# Patient Record
Sex: FEMALE | Race: WHITE | NOT HISPANIC OR LATINO | URBAN - METROPOLITAN AREA
[De-identification: names, ages, dates, MRNs, and addresses within clinical notes are randomized per-mention and may not be internally consistent; named-entity substitution may affect disease eponyms.]

---

## 2018-07-18 ENCOUNTER — EMERGENCY (EMERGENCY)
Facility: HOSPITAL | Age: 78
LOS: 1 days | Discharge: HOME | End: 2018-07-18
Attending: EMERGENCY MEDICINE
Payer: MEDICARE

## 2018-07-18 VITALS
WEIGHT: 136.91 LBS | TEMPERATURE: 98 F | HEIGHT: 56.3 IN | HEART RATE: 83 BPM | OXYGEN SATURATION: 98 % | SYSTOLIC BLOOD PRESSURE: 140 MMHG | RESPIRATION RATE: 17 BRPM | DIASTOLIC BLOOD PRESSURE: 93 MMHG

## 2018-07-18 LAB
ALBUMIN SERPL ELPH-MCNC: 3.7 G/DL — SIGNIFICANT CHANGE UP (ref 3.5–5)
ALP SERPL-CCNC: 80 U/L — SIGNIFICANT CHANGE UP (ref 40–120)
ALT FLD-CCNC: 40 U/L DA — SIGNIFICANT CHANGE UP (ref 10–60)
ANION GAP SERPL CALC-SCNC: 8 MMOL/L — SIGNIFICANT CHANGE UP (ref 5–17)
AST SERPL-CCNC: 27 U/L — SIGNIFICANT CHANGE UP (ref 10–40)
BASOPHILS # BLD AUTO: 0.1 K/UL — SIGNIFICANT CHANGE UP (ref 0–0.2)
BASOPHILS NFR BLD AUTO: 1 % — SIGNIFICANT CHANGE UP (ref 0–2)
BILIRUB SERPL-MCNC: 0.2 MG/DL — SIGNIFICANT CHANGE UP (ref 0.2–1.2)
BUN SERPL-MCNC: 10 MG/DL — SIGNIFICANT CHANGE UP (ref 7–18)
CALCIUM SERPL-MCNC: 9.8 MG/DL — SIGNIFICANT CHANGE UP (ref 8.4–10.5)
CHLORIDE SERPL-SCNC: 105 MMOL/L — SIGNIFICANT CHANGE UP (ref 96–108)
CO2 SERPL-SCNC: 26 MMOL/L — SIGNIFICANT CHANGE UP (ref 22–31)
CREAT SERPL-MCNC: 0.69 MG/DL — SIGNIFICANT CHANGE UP (ref 0.5–1.3)
EOSINOPHIL # BLD AUTO: 0.2 K/UL — SIGNIFICANT CHANGE UP (ref 0–0.5)
EOSINOPHIL NFR BLD AUTO: 2 % — SIGNIFICANT CHANGE UP (ref 0–6)
ERYTHROCYTE [SEDIMENTATION RATE] IN BLOOD: 83 MM/HR — HIGH (ref 0–20)
GLUCOSE SERPL-MCNC: 154 MG/DL — HIGH (ref 70–99)
HCT VFR BLD CALC: 39.3 % — SIGNIFICANT CHANGE UP (ref 34.5–45)
HGB BLD-MCNC: 12.8 G/DL — SIGNIFICANT CHANGE UP (ref 11.5–15.5)
LYMPHOCYTES # BLD AUTO: 1.7 K/UL — SIGNIFICANT CHANGE UP (ref 1–3.3)
LYMPHOCYTES # BLD AUTO: 20.7 % — SIGNIFICANT CHANGE UP (ref 13–44)
MCHC RBC-ENTMCNC: 30.3 PG — SIGNIFICANT CHANGE UP (ref 27–34)
MCHC RBC-ENTMCNC: 32.6 GM/DL — SIGNIFICANT CHANGE UP (ref 32–36)
MCV RBC AUTO: 93 FL — SIGNIFICANT CHANGE UP (ref 80–100)
MONOCYTES # BLD AUTO: 1 K/UL — HIGH (ref 0–0.9)
MONOCYTES NFR BLD AUTO: 11.6 % — SIGNIFICANT CHANGE UP (ref 2–14)
NEUTROPHILS # BLD AUTO: 5.4 K/UL — SIGNIFICANT CHANGE UP (ref 1.8–7.4)
NEUTROPHILS NFR BLD AUTO: 64.6 % — SIGNIFICANT CHANGE UP (ref 43–77)
PLATELET # BLD AUTO: 342 K/UL — SIGNIFICANT CHANGE UP (ref 150–400)
POTASSIUM SERPL-MCNC: 4.5 MMOL/L — SIGNIFICANT CHANGE UP (ref 3.5–5.3)
POTASSIUM SERPL-SCNC: 4.5 MMOL/L — SIGNIFICANT CHANGE UP (ref 3.5–5.3)
PROT SERPL-MCNC: 8.1 G/DL — SIGNIFICANT CHANGE UP (ref 6–8.3)
RBC # BLD: 4.23 M/UL — SIGNIFICANT CHANGE UP (ref 3.8–5.2)
RBC # FLD: 12.2 % — SIGNIFICANT CHANGE UP (ref 10.3–14.5)
SODIUM SERPL-SCNC: 139 MMOL/L — SIGNIFICANT CHANGE UP (ref 135–145)
WBC # BLD: 8.4 K/UL — SIGNIFICANT CHANGE UP (ref 3.8–10.5)
WBC # FLD AUTO: 8.4 K/UL — SIGNIFICANT CHANGE UP (ref 3.8–10.5)

## 2018-07-18 PROCEDURE — 80053 COMPREHEN METABOLIC PANEL: CPT

## 2018-07-18 PROCEDURE — 73630 X-RAY EXAM OF FOOT: CPT | Mod: 26,LT

## 2018-07-18 PROCEDURE — 73630 X-RAY EXAM OF FOOT: CPT

## 2018-07-18 PROCEDURE — 99284 EMERGENCY DEPT VISIT MOD MDM: CPT

## 2018-07-18 PROCEDURE — 85652 RBC SED RATE AUTOMATED: CPT

## 2018-07-18 PROCEDURE — 85027 COMPLETE CBC AUTOMATED: CPT

## 2018-07-18 PROCEDURE — 96375 TX/PRO/DX INJ NEW DRUG ADDON: CPT

## 2018-07-18 PROCEDURE — 99284 EMERGENCY DEPT VISIT MOD MDM: CPT | Mod: 25

## 2018-07-18 PROCEDURE — 96374 THER/PROPH/DIAG INJ IV PUSH: CPT

## 2018-07-18 RX ORDER — VANCOMYCIN HCL 1 G
1000 VIAL (EA) INTRAVENOUS ONCE
Qty: 0 | Refills: 0 | Status: COMPLETED | OUTPATIENT
Start: 2018-07-18 | End: 2018-07-18

## 2018-07-18 RX ORDER — PIPERACILLIN AND TAZOBACTAM 4; .5 G/20ML; G/20ML
3.38 INJECTION, POWDER, LYOPHILIZED, FOR SOLUTION INTRAVENOUS ONCE
Qty: 0 | Refills: 0 | Status: COMPLETED | OUTPATIENT
Start: 2018-07-18 | End: 2018-07-18

## 2018-07-18 RX ADMIN — PIPERACILLIN AND TAZOBACTAM 200 GRAM(S): 4; .5 INJECTION, POWDER, LYOPHILIZED, FOR SOLUTION INTRAVENOUS at 13:52

## 2018-07-18 RX ADMIN — Medication 250 MILLIGRAM(S): at 13:52

## 2018-07-18 NOTE — ED ADULT NURSE NOTE - OBJECTIVE STATEMENT
Pt AOx3, ambulatory, c/o left foot bunion wound x 3 days.  p/w swelling, redness, mild bleeding. Pt denies fever, chills.

## 2018-07-18 NOTE — ED PROVIDER NOTE - OBJECTIVE STATEMENT
77 y/o female with PMHx of DM presents to the ED sent by podiatrist Dr. Brown for foot infection x 3 days. Pt noted to have an erythematous and inflamed L bunion x 3 days. Pt seen by podiatrist today who performed an I&D which unroofed a scab and drained a small amount of blood and pus. Pt sent to ED for dose of abx. Pt denies fever, chills, or any other complaints. NKDA. 79 y/o female with PMHx of DM presents to the ED sent by podiatrist Dr. Brown for foot infection x 3 days. Pt noted to have an erythematous and inflamed L bunion x 3 days. Pt seen by podiatrist today who performed an I&D which unroofed a scab and drained a small amount of blood and other fluid. Pt sent to ED for dose of abx. Pt denies fever, chills, or any other complaints. NKDA.

## 2018-07-18 NOTE — ED PROVIDER NOTE - MEDICAL DECISION MAKING DETAILS
Pt with infection at L bunion x 3 days. Will get labs, IV abx, xray and podiatry consultation. Pt with infection at L bunion x 3 days. Will get labs, IV abx, xray and podiatry consultation.  podiatry evaluated patient, labs normal. patient received dose of IV vanco zosyn. as per Dr Stover's recommendation patient is to be discharged home to continue agumentin. Patient instructed to return to Emergency Department for admission if worsening redness or fever. also if no improvement on oral antibiotics. admission offered but patient declined, prefers trial of outpatient treatment. patient and daughter understand plan

## 2018-07-18 NOTE — CONSULT NOTE ADULT - SUBJECTIVE AND OBJECTIVE BOX
Patient is a 78y old  Female who presents with a chief complaint of left foot painful bunion    HPI: 78 year old female was sent to ED from Dr. Brown's office for work up cellulitic left bunion. Patient states she was given Augmentin for the affected area, and that the redness has begun to dissipate. Patient states when the affected area was seen in the office, it was lanced to see if any drainage came out of it. Stephanie only got serosanguinous drainage out of it. Pt denies any constitutional symptoms.      PMH:DM (diabetes mellitus)    Allergies: No Known Allergies    Medications:   FH:  PSX: No significant past surgical history    SH:     Vital Signs Last 24 Hrs  T(C): 36.5 (18 Jul 2018 12:00), Max: 36.5 (18 Jul 2018 12:00)  T(F): 97.7 (18 Jul 2018 12:00), Max: 97.7 (18 Jul 2018 12:00)  HR: 83 (18 Jul 2018 12:00) (83 - 83)  BP: 140/93 (18 Jul 2018 12:00) (140/93 - 140/93)  BP(mean): --  RR: 17 (18 Jul 2018 12:00) (17 - 17)  SpO2: 98% (18 Jul 2018 12:00) (98% - 98%)    LABS                        12.8   8.4   )-----------( 342      ( 18 Jul 2018 13:50 )             39.3               07-18    139  |  105  |  10  ----------------------------<  154<H>  4.5   |  26  |  0.69    Ca    9.8      18 Jul 2018 13:50    TPro  8.1  /  Alb  3.7  /  TBili  0.2  /  DBili  x   /  AST  27  /  ALT  40  /  AlkPhos  80  07-18      ROS  REVIEW OF SYSTEMS:    CONSTITUTIONAL: No fever, weight loss, or fatigue  EYES: No eye pain, visual disturbances, or discharge  ENMT:  No difficulty hearing, tinnitus, vertigo; No sinus or throat pain  NECK: No pain or stiffness  BREASTS: No pain, masses, or nipple discharge  RESPIRATORY: No cough, wheezing, chills or hemoptysis; No shortness of breath  CARDIOVASCULAR: No chest pain, palpitations, dizziness, or leg swelling  GASTROINTESTINAL: No abdominal or epigastric pain. No nausea, vomiting, or hematemesis; No diarrhea or constipation. No melena or hematochezia.  GENITOURINARY: No dysuria, frequency, hematuria, or incontinence  NEUROLOGICAL: No headaches, memory loss, loss of strength, numbness, or tremors  SKIN: No itching, burning, rashes, or lesions   LYMPH NODES: No enlarged glands  ENDOCRINE: No heat or cold intolerance; No hair loss  MUSCULOSKELETAL: No joint pain or swelling; No muscle, back, or extremity pain  PSYCHIATRIC: No depression, anxiety, mood swings, or difficulty sleeping  HEME/LYMPH: No easy bruising, or bleeding gums  ALLERY AND IMMUNOLOGIC: No hives or eczema      PHYSICAL EXAM  GEN: GILDA GOMEZ is a pleasant well-nourished, well developed 78y Female in no acute distress, alert awake, and oriented to person, place and time.   LE Focused:    Vasc:  DP/PT 2/4 b/l; CFT < 3 sec x 10; TG warm to cool; erythema and edema noted over the left bunion medial eminence   Derm: Erythema and edema noted over the left bunion medial eminence - no drainage; no malodor; no PTB  Neuro: Grossly intact    Imaging: < from: Xray Foot AP + Lateral + Oblique, Left (07.18.18 @ 14:38) >  3 views of the left foot are acquired without a previous examination for   comparison.    Impression: Hallux valgus.     No evidence for acute fracture or dislocation.    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    No radiographic evidence for osteomyelitis.    < end of copied text >    A:  Cellulitis    P:  Pt evaluated and chart reviewed  Labs reviewed - no current white count  Imaging reviewed - see above - no signs of OM  Per Dr. Brown, patient to continue oral Augmentin for the cellulitic area  Patient told to return to the ED if the patient begins to show any F/V/N/C/SOB - which she denied at this visit  Patient will follow up with Dr. Brown outpatient

## 2018-07-18 NOTE — ED PROVIDER NOTE - MUSCULOSKELETAL MINIMAL EXAM
L foot erythema, tenderness over L MTP area, no foul odor L foot erythema, tenderness over L MTP area, no foul odor or drainage

## 2020-07-08 NOTE — ED ADULT NURSE NOTE - NS ED NURSE LEVEL OF CONSCIOUSNESS AFFECT
Patient has been scheduled for open colon. She is aware of the pre-op covid-19 testing. Prep instructions have been emailed to her at [email or web address]   Calm

## 2021-02-02 NOTE — ED PROVIDER NOTE - GASTROINTESTINAL, MLM
Price (Do Not Change): 0.00 Instructions: This plan will send the code FBSE to the PM system.  DO NOT or CHANGE the price. Detail Level: Simple Abdomen soft, non-tender, no guarding.

## 2021-05-30 NOTE — ED PROVIDER NOTE - DIAGNOSIS COUNSELING, MDM
Yes conducted a detailed discussion... I had a detailed discussion with the patient and/or guardian regarding the historical points, exam findings, and any diagnostic results supporting the discharge/admit diagnosis.

## 2023-06-01 NOTE — ED ADULT NURSE NOTE - NS ED NURSE RECORD ANOTHER HT AND WT
Yes Sotyktu Pregnancy And Lactation Text: There is insufficient data to evaluate whether or not Sotyktu is safe to use during pregnancy.? ?It is not known if Sotyktu passes into breast milk and whether or not it is safe to use when breastfeeding.??

## 2024-11-17 ENCOUNTER — INPATIENT (INPATIENT)
Facility: HOSPITAL | Age: 84
LOS: 5 days | Discharge: NURSING, REHAB OR LONG-TERM CARE | DRG: 948 | End: 2024-11-23
Attending: INTERNAL MEDICINE | Admitting: INTERNAL MEDICINE
Payer: MEDICARE

## 2024-11-17 VITALS
HEART RATE: 96 BPM | WEIGHT: 139.99 LBS | RESPIRATION RATE: 18 BRPM | TEMPERATURE: 98 F | HEIGHT: 59 IN | SYSTOLIC BLOOD PRESSURE: 120 MMHG | DIASTOLIC BLOOD PRESSURE: 78 MMHG | OXYGEN SATURATION: 95 %

## 2024-11-17 LAB
ALBUMIN SERPL ELPH-MCNC: 3.4 G/DL — LOW (ref 3.5–5)
ALP SERPL-CCNC: 61 U/L — SIGNIFICANT CHANGE UP (ref 40–120)
ALT FLD-CCNC: 22 U/L DA — SIGNIFICANT CHANGE UP (ref 10–60)
ANION GAP SERPL CALC-SCNC: 6 MMOL/L — SIGNIFICANT CHANGE UP (ref 5–17)
APTT BLD: 25.3 SEC — SIGNIFICANT CHANGE UP (ref 24.5–35.6)
AST SERPL-CCNC: 15 U/L — SIGNIFICANT CHANGE UP (ref 10–40)
BASOPHILS # BLD AUTO: 0.01 K/UL — SIGNIFICANT CHANGE UP (ref 0–0.2)
BASOPHILS NFR BLD AUTO: 0.1 % — SIGNIFICANT CHANGE UP (ref 0–2)
BILIRUB SERPL-MCNC: 0.3 MG/DL — SIGNIFICANT CHANGE UP (ref 0.2–1.2)
BUN SERPL-MCNC: 39 MG/DL — HIGH (ref 7–18)
CALCIUM SERPL-MCNC: 9.8 MG/DL — SIGNIFICANT CHANGE UP (ref 8.4–10.5)
CHLORIDE SERPL-SCNC: 102 MMOL/L — SIGNIFICANT CHANGE UP (ref 96–108)
CO2 SERPL-SCNC: 28 MMOL/L — SIGNIFICANT CHANGE UP (ref 22–31)
CREAT SERPL-MCNC: 0.76 MG/DL — SIGNIFICANT CHANGE UP (ref 0.5–1.3)
EGFR: 76 ML/MIN/1.73M2 — SIGNIFICANT CHANGE UP
EOSINOPHIL # BLD AUTO: 0 K/UL — SIGNIFICANT CHANGE UP (ref 0–0.5)
EOSINOPHIL NFR BLD AUTO: 0 % — SIGNIFICANT CHANGE UP (ref 0–6)
GLUCOSE SERPL-MCNC: 245 MG/DL — HIGH (ref 70–99)
HCT VFR BLD CALC: 29.5 % — LOW (ref 34.5–45)
HGB BLD-MCNC: 10.1 G/DL — LOW (ref 11.5–15.5)
IMM GRANULOCYTES NFR BLD AUTO: 0.3 % — SIGNIFICANT CHANGE UP (ref 0–0.9)
INR BLD: 1.13 RATIO — SIGNIFICANT CHANGE UP (ref 0.85–1.16)
LIDOCAIN IGE QN: 17 U/L — SIGNIFICANT CHANGE UP (ref 13–75)
LYMPHOCYTES # BLD AUTO: 1.76 K/UL — SIGNIFICANT CHANGE UP (ref 1–3.3)
LYMPHOCYTES # BLD AUTO: 14.1 % — SIGNIFICANT CHANGE UP (ref 13–44)
MCHC RBC-ENTMCNC: 30.8 PG — SIGNIFICANT CHANGE UP (ref 27–34)
MCHC RBC-ENTMCNC: 34.2 G/DL — SIGNIFICANT CHANGE UP (ref 32–36)
MCV RBC AUTO: 89.9 FL — SIGNIFICANT CHANGE UP (ref 80–100)
MONOCYTES # BLD AUTO: 1.38 K/UL — HIGH (ref 0–0.9)
MONOCYTES NFR BLD AUTO: 11.1 % — SIGNIFICANT CHANGE UP (ref 2–14)
NEUTROPHILS # BLD AUTO: 9.29 K/UL — HIGH (ref 1.8–7.4)
NEUTROPHILS NFR BLD AUTO: 74.4 % — SIGNIFICANT CHANGE UP (ref 43–77)
NRBC # BLD: 0 /100 WBCS — SIGNIFICANT CHANGE UP (ref 0–0)
PLATELET # BLD AUTO: 360 K/UL — SIGNIFICANT CHANGE UP (ref 150–400)
POTASSIUM SERPL-MCNC: 3.9 MMOL/L — SIGNIFICANT CHANGE UP (ref 3.5–5.3)
POTASSIUM SERPL-SCNC: 3.9 MMOL/L — SIGNIFICANT CHANGE UP (ref 3.5–5.3)
PROT SERPL-MCNC: 7.3 G/DL — SIGNIFICANT CHANGE UP (ref 6–8.3)
PROTHROM AB SERPL-ACNC: 13.1 SEC — SIGNIFICANT CHANGE UP (ref 9.9–13.4)
RBC # BLD: 3.28 M/UL — LOW (ref 3.8–5.2)
RBC # FLD: 13.3 % — SIGNIFICANT CHANGE UP (ref 10.3–14.5)
SODIUM SERPL-SCNC: 136 MMOL/L — SIGNIFICANT CHANGE UP (ref 135–145)
WBC # BLD: 12.48 K/UL — HIGH (ref 3.8–10.5)
WBC # FLD AUTO: 12.48 K/UL — HIGH (ref 3.8–10.5)

## 2024-11-17 PROCEDURE — 74177 CT ABD & PELVIS W/CONTRAST: CPT | Mod: 26,MC

## 2024-11-17 PROCEDURE — 99285 EMERGENCY DEPT VISIT HI MDM: CPT

## 2024-11-17 PROCEDURE — 70450 CT HEAD/BRAIN W/O DYE: CPT | Mod: 26,MC

## 2024-11-17 RX ORDER — SODIUM CHLORIDE 9 MG/ML
1000 INJECTION, SOLUTION INTRAMUSCULAR; INTRAVENOUS; SUBCUTANEOUS ONCE
Refills: 0 | Status: COMPLETED | OUTPATIENT
Start: 2024-11-17 | End: 2024-11-17

## 2024-11-17 RX ADMIN — SODIUM CHLORIDE 1000 MILLILITER(S): 9 INJECTION, SOLUTION INTRAMUSCULAR; INTRAVENOUS; SUBCUTANEOUS at 21:04

## 2024-11-17 RX ADMIN — SODIUM CHLORIDE 1000 MILLILITER(S): 9 INJECTION, SOLUTION INTRAMUSCULAR; INTRAVENOUS; SUBCUTANEOUS at 22:04

## 2024-11-17 NOTE — ED ADULT NURSE NOTE - NSFALLHARMRISKINTERV_ED_ALL_ED
Assistance OOB with selected safe patient handling equipment if applicable/Assistance with ambulation/Communicate risk of Fall with Harm to all staff, patient, and family/Monitor gait and stability/Monitor for mental status changes and reorient to person, place, and time, as needed/Provide visual cue: red socks, yellow wristband, yellow gown, etc/Reinforce activity limits and safety measures with patient and family/Toileting schedule using arm’s reach rule for commode and bathroom/Use of alarms - bed, stretcher, chair and/or video monitoring/Bed in lowest position, wheels locked, appropriate side rails in place/Call bell, personal items and telephone in reach/Instruct patient to call for assistance before getting out of bed/chair/stretcher/Non-slip footwear applied when patient is off stretcher/[place] to call system/Physically safe environment - no spills, clutter or unnecessary equipment/Purposeful Proactive Rounding/Room/bathroom lighting operational, light cord in reach

## 2024-11-17 NOTE — ED ADULT NURSE NOTE - OBJECTIVE STATEMENT
patient c/o weakness and altered mental status x 4 days. Denies chest pain, dizziness, headache, nausea and vomiting. no SOB.

## 2024-11-17 NOTE — ED PROVIDER NOTE - OBJECTIVE STATEMENT
84-year-old history of diabetes presenting for  confusion for past 4 days family endorses she had vomiting x 1 4 days ago otherwise been noting generalized weakness without focal deficit family also noted that she has not been speaking for the past 4 days

## 2024-11-17 NOTE — ED PROVIDER NOTE - CLINICAL SUMMARY MEDICAL DECISION MAKING FREE TEXT BOX
Patient presenting for altered mental status per family patient baseline able to communicate patient currently awake alert but not communicating will obtain labs CT assess for infection and assess for ICH ED observation and reassess patient's symptoms for over 3 days out of tPA window will monitor

## 2024-11-17 NOTE — ED ADULT TRIAGE NOTE - CHIEF COMPLAINT QUOTE
[facility] reports that  pt is  AMS from Friday , confused , poor appetite , non verbal  from Friday .

## 2024-11-17 NOTE — ED ADULT NURSE NOTE - CHPI ED NUR SYMPTOMS NEG
no blurred vision/no change in level of consciousness/no confusion/no dizziness/no fever/no loss of consciousness/no nausea/no numbness/no vomiting/no weakness no blurred vision/no change in level of consciousness/no dizziness/no fever/no loss of consciousness/no nausea/no numbness/no vomiting/no weakness

## 2024-11-18 DIAGNOSIS — E11.9 TYPE 2 DIABETES MELLITUS WITHOUT COMPLICATIONS: ICD-10-CM

## 2024-11-18 DIAGNOSIS — G93.40 ENCEPHALOPATHY, UNSPECIFIED: ICD-10-CM

## 2024-11-18 DIAGNOSIS — K29.80 DUODENITIS WITHOUT BLEEDING: ICD-10-CM

## 2024-11-18 DIAGNOSIS — Z29.9 ENCOUNTER FOR PROPHYLACTIC MEASURES, UNSPECIFIED: ICD-10-CM

## 2024-11-18 DIAGNOSIS — A41.9 SEPSIS, UNSPECIFIED ORGANISM: ICD-10-CM

## 2024-11-18 DIAGNOSIS — R41.82 ALTERED MENTAL STATUS, UNSPECIFIED: ICD-10-CM

## 2024-11-18 DIAGNOSIS — E78.5 HYPERLIPIDEMIA, UNSPECIFIED: ICD-10-CM

## 2024-11-18 DIAGNOSIS — I10 ESSENTIAL (PRIMARY) HYPERTENSION: ICD-10-CM

## 2024-11-18 LAB
A1C WITH ESTIMATED AVERAGE GLUCOSE RESULT: 7.4 % — HIGH (ref 4–5.6)
ALBUMIN SERPL ELPH-MCNC: 3.1 G/DL — LOW (ref 3.5–5)
ALP SERPL-CCNC: 55 U/L — SIGNIFICANT CHANGE UP (ref 40–120)
ALT FLD-CCNC: 19 U/L DA — SIGNIFICANT CHANGE UP (ref 10–60)
ANION GAP SERPL CALC-SCNC: 6 MMOL/L — SIGNIFICANT CHANGE UP (ref 5–17)
APPEARANCE UR: CLEAR — SIGNIFICANT CHANGE UP
AST SERPL-CCNC: 12 U/L — SIGNIFICANT CHANGE UP (ref 10–40)
BASOPHILS # BLD AUTO: 0.02 K/UL — SIGNIFICANT CHANGE UP (ref 0–0.2)
BASOPHILS NFR BLD AUTO: 0.2 % — SIGNIFICANT CHANGE UP (ref 0–2)
BILIRUB SERPL-MCNC: 0.2 MG/DL — SIGNIFICANT CHANGE UP (ref 0.2–1.2)
BILIRUB UR-MCNC: NEGATIVE — SIGNIFICANT CHANGE UP
BUN SERPL-MCNC: 29 MG/DL — HIGH (ref 7–18)
CALCIUM SERPL-MCNC: 9.2 MG/DL — SIGNIFICANT CHANGE UP (ref 8.4–10.5)
CHLORIDE SERPL-SCNC: 107 MMOL/L — SIGNIFICANT CHANGE UP (ref 96–108)
CO2 SERPL-SCNC: 29 MMOL/L — SIGNIFICANT CHANGE UP (ref 22–31)
COLOR SPEC: YELLOW — SIGNIFICANT CHANGE UP
CREAT SERPL-MCNC: 0.58 MG/DL — SIGNIFICANT CHANGE UP (ref 0.5–1.3)
DIFF PNL FLD: NEGATIVE — SIGNIFICANT CHANGE UP
EGFR: 88 ML/MIN/1.73M2 — SIGNIFICANT CHANGE UP
EOSINOPHIL # BLD AUTO: 0.02 K/UL — SIGNIFICANT CHANGE UP (ref 0–0.5)
EOSINOPHIL NFR BLD AUTO: 0.2 % — SIGNIFICANT CHANGE UP (ref 0–6)
ESTIMATED AVERAGE GLUCOSE: 166 MG/DL — HIGH (ref 68–114)
FLUAV AG NPH QL: SIGNIFICANT CHANGE UP
FLUBV AG NPH QL: SIGNIFICANT CHANGE UP
GLUCOSE BLDC GLUCOMTR-MCNC: 100 MG/DL — HIGH (ref 70–99)
GLUCOSE BLDC GLUCOMTR-MCNC: 69 MG/DL — LOW (ref 70–99)
GLUCOSE BLDC GLUCOMTR-MCNC: 69 MG/DL — LOW (ref 70–99)
GLUCOSE BLDC GLUCOMTR-MCNC: 71 MG/DL — SIGNIFICANT CHANGE UP (ref 70–99)
GLUCOSE BLDC GLUCOMTR-MCNC: 75 MG/DL — SIGNIFICANT CHANGE UP (ref 70–99)
GLUCOSE BLDC GLUCOMTR-MCNC: 96 MG/DL — SIGNIFICANT CHANGE UP (ref 70–99)
GLUCOSE SERPL-MCNC: 63 MG/DL — LOW (ref 70–99)
GLUCOSE UR QL: >=1000 MG/DL
HCT VFR BLD CALC: 26.3 % — LOW (ref 34.5–45)
HGB BLD-MCNC: 8.9 G/DL — LOW (ref 11.5–15.5)
IMM GRANULOCYTES NFR BLD AUTO: 0.3 % — SIGNIFICANT CHANGE UP (ref 0–0.9)
KETONES UR-MCNC: NEGATIVE MG/DL — SIGNIFICANT CHANGE UP
LACTATE SERPL-SCNC: 0.8 MMOL/L — SIGNIFICANT CHANGE UP (ref 0.7–2)
LEUKOCYTE ESTERASE UR-ACNC: NEGATIVE — SIGNIFICANT CHANGE UP
LYMPHOCYTES # BLD AUTO: 1.69 K/UL — SIGNIFICANT CHANGE UP (ref 1–3.3)
LYMPHOCYTES # BLD AUTO: 14.9 % — SIGNIFICANT CHANGE UP (ref 13–44)
MAGNESIUM SERPL-MCNC: 1.6 MG/DL — SIGNIFICANT CHANGE UP (ref 1.6–2.6)
MCHC RBC-ENTMCNC: 30.9 PG — SIGNIFICANT CHANGE UP (ref 27–34)
MCHC RBC-ENTMCNC: 33.8 G/DL — SIGNIFICANT CHANGE UP (ref 32–36)
MCV RBC AUTO: 91.3 FL — SIGNIFICANT CHANGE UP (ref 80–100)
MONOCYTES # BLD AUTO: 1.19 K/UL — HIGH (ref 0–0.9)
MONOCYTES NFR BLD AUTO: 10.5 % — SIGNIFICANT CHANGE UP (ref 2–14)
NEUTROPHILS # BLD AUTO: 8.38 K/UL — HIGH (ref 1.8–7.4)
NEUTROPHILS NFR BLD AUTO: 73.9 % — SIGNIFICANT CHANGE UP (ref 43–77)
NITRITE UR-MCNC: NEGATIVE — SIGNIFICANT CHANGE UP
NRBC # BLD: 0 /100 WBCS — SIGNIFICANT CHANGE UP (ref 0–0)
PH UR: 5 — SIGNIFICANT CHANGE UP (ref 5–8)
PHOSPHATE SERPL-MCNC: 2.6 MG/DL — SIGNIFICANT CHANGE UP (ref 2.5–4.5)
PLATELET # BLD AUTO: 307 K/UL — SIGNIFICANT CHANGE UP (ref 150–400)
POTASSIUM SERPL-MCNC: 3.1 MMOL/L — LOW (ref 3.5–5.3)
POTASSIUM SERPL-SCNC: 3.1 MMOL/L — LOW (ref 3.5–5.3)
PROCALCITONIN SERPL-MCNC: 0.45 NG/ML — HIGH (ref 0.02–0.1)
PROT SERPL-MCNC: 6.5 G/DL — SIGNIFICANT CHANGE UP (ref 6–8.3)
PROT UR-MCNC: 100 MG/DL
RBC # BLD: 2.88 M/UL — LOW (ref 3.8–5.2)
RBC # FLD: 13.3 % — SIGNIFICANT CHANGE UP (ref 10.3–14.5)
RSV RNA NPH QL NAA+NON-PROBE: SIGNIFICANT CHANGE UP
SARS-COV-2 RNA SPEC QL NAA+PROBE: SIGNIFICANT CHANGE UP
SODIUM SERPL-SCNC: 142 MMOL/L — SIGNIFICANT CHANGE UP (ref 135–145)
SP GR SPEC: 1.03 — SIGNIFICANT CHANGE UP (ref 1–1.03)
TSH SERPL-MCNC: 0.47 UU/ML — SIGNIFICANT CHANGE UP (ref 0.34–4.82)
UROBILINOGEN FLD QL: 0.2 MG/DL — SIGNIFICANT CHANGE UP (ref 0.2–1)
WBC # BLD: 11.33 K/UL — HIGH (ref 3.8–10.5)
WBC # FLD AUTO: 11.33 K/UL — HIGH (ref 3.8–10.5)

## 2024-11-18 PROCEDURE — 71045 X-RAY EXAM CHEST 1 VIEW: CPT | Mod: 26

## 2024-11-18 PROCEDURE — 99223 1ST HOSP IP/OBS HIGH 75: CPT | Mod: FS

## 2024-11-18 RX ORDER — 0.9 % SODIUM CHLORIDE 0.9 %
1000 INTRAVENOUS SOLUTION INTRAVENOUS ONCE
Refills: 0 | Status: COMPLETED | OUTPATIENT
Start: 2024-11-18 | End: 2024-11-18

## 2024-11-18 RX ORDER — 0.9 % SODIUM CHLORIDE 0.9 %
1000 INTRAVENOUS SOLUTION INTRAVENOUS
Refills: 0 | Status: DISCONTINUED | OUTPATIENT
Start: 2024-11-18 | End: 2024-11-18

## 2024-11-18 RX ORDER — OLANZAPINE 20 MG/1
2.5 TABLET ORAL DAILY
Refills: 0 | Status: DISCONTINUED | OUTPATIENT
Start: 2024-11-18 | End: 2024-11-20

## 2024-11-18 RX ORDER — 0.9 % SODIUM CHLORIDE 0.9 %
1000 INTRAVENOUS SOLUTION INTRAVENOUS
Refills: 0 | Status: DISCONTINUED | OUTPATIENT
Start: 2024-11-18 | End: 2024-11-19

## 2024-11-18 RX ORDER — ACETAMINOPHEN 500MG 500 MG/1
650 TABLET, COATED ORAL EVERY 6 HOURS
Refills: 0 | Status: DISCONTINUED | OUTPATIENT
Start: 2024-11-18 | End: 2024-11-20

## 2024-11-18 RX ORDER — POTASSIUM CHLORIDE 600 MG/1
40 TABLET, EXTENDED RELEASE ORAL ONCE
Refills: 0 | Status: DISCONTINUED | OUTPATIENT
Start: 2024-11-18 | End: 2024-11-18

## 2024-11-18 RX ORDER — ENOXAPARIN SODIUM 30 MG/.3ML
40 INJECTION SUBCUTANEOUS EVERY 24 HOURS
Refills: 0 | Status: DISCONTINUED | OUTPATIENT
Start: 2024-11-18 | End: 2024-11-19

## 2024-11-18 RX ORDER — METOPROLOL TARTRATE 100 MG/1
50 TABLET, FILM COATED ORAL
Refills: 0 | Status: DISCONTINUED | OUTPATIENT
Start: 2024-11-18 | End: 2024-11-20

## 2024-11-18 RX ORDER — ACETAMINOPHEN, DIPHENHYDRAMINE HCL, PHENYLEPHRINE HCL 325; 25; 5 MG/1; MG/1; MG/1
5 TABLET ORAL AT BEDTIME
Refills: 0 | Status: DISCONTINUED | OUTPATIENT
Start: 2024-11-18 | End: 2024-11-20

## 2024-11-18 RX ORDER — GLUCAGON INJECTION, SOLUTION 0.5 MG/.1ML
1 INJECTION, SOLUTION SUBCUTANEOUS ONCE
Refills: 0 | Status: DISCONTINUED | OUTPATIENT
Start: 2024-11-18 | End: 2024-11-20

## 2024-11-18 RX ORDER — CEFTRIAXONE SODIUM 1 G
1000 VIAL (EA) INJECTION EVERY 24 HOURS
Refills: 0 | Status: DISCONTINUED | OUTPATIENT
Start: 2024-11-18 | End: 2024-11-20

## 2024-11-18 RX ORDER — OLANZAPINE 20 MG/1
2.5 TABLET ORAL ONCE
Refills: 0 | Status: COMPLETED | OUTPATIENT
Start: 2024-11-18 | End: 2024-11-18

## 2024-11-18 RX ORDER — POTASSIUM CHLORIDE 600 MG/1
10 TABLET, EXTENDED RELEASE ORAL
Refills: 0 | Status: COMPLETED | OUTPATIENT
Start: 2024-11-18 | End: 2024-11-18

## 2024-11-18 RX ADMIN — Medication 65 MILLILITER(S): at 11:09

## 2024-11-18 RX ADMIN — ENOXAPARIN SODIUM 40 MILLIGRAM(S): 30 INJECTION SUBCUTANEOUS at 06:58

## 2024-11-18 RX ADMIN — POTASSIUM CHLORIDE 50 MILLIEQUIVALENT(S): 600 TABLET, EXTENDED RELEASE ORAL at 11:10

## 2024-11-18 RX ADMIN — Medication 65 MILLILITER(S): at 17:53

## 2024-11-18 RX ADMIN — POTASSIUM CHLORIDE 50 MILLIEQUIVALENT(S): 600 TABLET, EXTENDED RELEASE ORAL at 13:38

## 2024-11-18 RX ADMIN — METOPROLOL TARTRATE 50 MILLIGRAM(S): 100 TABLET, FILM COATED ORAL at 17:53

## 2024-11-18 RX ADMIN — METOPROLOL TARTRATE 50 MILLIGRAM(S): 100 TABLET, FILM COATED ORAL at 10:42

## 2024-11-18 RX ADMIN — Medication 100 MILLIGRAM(S): at 06:56

## 2024-11-18 RX ADMIN — POTASSIUM CHLORIDE 50 MILLIEQUIVALENT(S): 600 TABLET, EXTENDED RELEASE ORAL at 12:25

## 2024-11-18 RX ADMIN — Medication 1000 MILLILITER(S): at 02:22

## 2024-11-18 RX ADMIN — Medication 10 MILLIGRAM(S): at 20:32

## 2024-11-18 RX ADMIN — OLANZAPINE 2.5 MILLIGRAM(S): 20 TABLET ORAL at 21:56

## 2024-11-18 NOTE — CONSULT NOTE ADULT - ASSESSMENT
Leukocytosis - no obvious source of infection identified at this time        Plan - Cont Rocephin 1gm iv q24hrs for now  await culture results.

## 2024-11-18 NOTE — H&P ADULT - PROBLEM SELECTOR PLAN 3
Will continue Metoprolol with parameters  Resume ACEi later in the hospital course  BP target <130/90 mmHg  DASH/TLC diet  Adjust medications as needed to meet target.

## 2024-11-18 NOTE — H&P ADULT - PROBLEM SELECTOR PLAN 2
Patient now at baseline  Ambulate w/ assistance  F/U Bladder scan  Monitor electrolytes   Hydration   Fall precautions  Aspiration precautions

## 2024-11-18 NOTE — CONSULT NOTE ADULT - RESPIRATORY
No heavy lifting/No sports/gym/No excercise
clear to auscultation bilaterally/no wheezes/no rales/no rhonchi

## 2024-11-18 NOTE — H&P ADULT - ASSESSMENT
An 84 year old female, living w/ son at home, ambulating w/ a cane, w/ Dementia, AAOx 2 to 3 at baseline, w/ PMHx of DM, HTN, and HLD was brought into the ED by EMS due to AMS. Patient now at baseline. Admitted to medicine for sepsis.     Daughter with list of some of the medications at bedside. Missing frequency and Insulin.   MORNING TEAM TO CONFIRM REST OF [facility]

## 2024-11-18 NOTE — CHART NOTE - NSCHARTNOTEFT_GEN_A_CORE
EVENT:     SUBJECTIVE:    OBJECTIVE:  Vital Signs Last 24 Hrs  T(C): 36.2 (18 Nov 2024 19:55), Max: 36.8 (18 Nov 2024 10:39)  T(F): 97.2 (18 Nov 2024 19:55), Max: 98.2 (18 Nov 2024 10:39)  HR: 81 (18 Nov 2024 19:55) (74 - 92)  BP: 131/95 (18 Nov 2024 19:55) (99/50 - 137/78)  BP(mean): --  RR: 18 (18 Nov 2024 19:55) (16 - 18)  SpO2: 97% (18 Nov 2024 19:55) (93% - 97%)    Parameters below as of 18 Nov 2024 19:55  Patient On (Oxygen Delivery Method): room air        PHYSICAL EXAM:  Neuro: Awake and alert, oriented to person, place, and time  Cardiovascular: + S1, S2, no murmurs, rubs, or bruits  Respiratory: clear to auscultation bilaterally with good air entry   GI: Abdomen soft, non-tender, bowel sounds present   : Non distended;   Skin: warm and dry; no rash      LABS:                        8.9    11.33 )-----------( 307      ( 18 Nov 2024 08:14 )             26.3     11-18    142  |  107  |  29[H]  ----------------------------<  63[L]  3.1[L]   |  29  |  0.58    Ca    9.2      18 Nov 2024 08:14  Phos  2.6     11-18  Mg     1.6     11-18    TPro  6.5  /  Alb  3.1[L]  /  TBili  0.2  /  DBili  x   /  AST  12  /  ALT  19  /  AlkPhos  55  11-18        EKG:   IMAGING:    ASSESSMENT:  HPI:  An 84 year old female, living w/ son at home, ambulating w/ a cane, w/ Dementia, AAOx 2 to 3 at baseline, w/ PMHx of DM, HTN, and HLD was brought into the ED by EMS due to AMS. Patient AAOx2 to person and place, but does not know why she is in the hospital. Denies chest pain, dyspnea, palpitations, nausea, vomiting, chills, numbness, headache, visual spots, hearing/taste/smell changes, warm sensation, urinary or stool incontinence. Patient mentioned feeling tired. As per daughter, patient was having decreased appetite, nausea, one episode of non bloody emesis, 2 episodes of non bloody, watery stools, abdominal pain, weakness, and confused for the last 2 days. Daughter gave her Prilosec yesterday which resolved her abdominal pain. No recent sick contacts or travel. She does not have any other complaints. (18 Nov 2024 02:16)      PLAN:     FOLLOW UP / RESULT: EVENT: Notified by RN, pt is acutely agitated, physically aggressive towards staff.     HPI:  84 year old female, living w/ son at home, ambulating w/ a cane, w/ Dementia, AAOx 2 to 3 at baseline, w/ PMHx of DM, HTN, and HLD was brought into the ED by EMS due to AMS.  Admitted to medicine for sepsis.     SUBJECTIVE: Unable to assess due to mental status. Pt seen and examined at bedside, A&Ox1, very confused. Observed with both legs over the side rails and tubing in her hand. When attempting to reorient, she hits and kicks staff.     OBJECTIVE:  Vital Signs Last 24 Hrs  T(C): 36.2 (18 Nov 2024 19:55), Max: 36.8 (18 Nov 2024 10:39)  T(F): 97.2 (18 Nov 2024 19:55), Max: 98.2 (18 Nov 2024 10:39)  HR: 81 (18 Nov 2024 19:55) (74 - 92)  BP: 131/95 (18 Nov 2024 19:55) (99/50 - 137/78)  BP(mean): --  RR: 18 (18 Nov 2024 19:55) (16 - 18)  SpO2: 97% (18 Nov 2024 19:55) (93% - 97%)    Parameters below as of 18 Nov 2024 19:55  Patient On (Oxygen Delivery Method): room air    PHYSICAL EXAM:  General: frail, elderly women lying in bed  Neuro: Awake and alert, but confused  Cardiovascular: + S1, S2, no murmurs, rubs, or bruits  Respiratory: clear to auscultation bilaterally with good air entry   GI: Abdomen soft, non-tender, bowel sounds present   : Non distended;   Skin: warm and dry; no rash      LABS:                        8.9    11.33 )-----------( 307      ( 18 Nov 2024 08:14 )             26.3     11-18    142  |  107  |  29[H]  ----------------------------<  63[L]  3.1[L]   |  29  |  0.58    Ca    9.2      18 Nov 2024 08:14  Phos  2.6     11-18  Mg     1.6     11-18    TPro  6.5  /  Alb  3.1[L]  /  TBili  0.2  /  DBili  x   /  AST  12  /  ALT  19  /  AlkPhos  55  11-18        EKG:   IMAGING:    ASSESSMENT/PROBLEM: Acute agitation likely exacerbated by infectious process and underlying dementia     PLAN:     -Due to irate state, pt unable to take PO antipsychotic. Zyrexa 2.5 mg IM x 1 dose STAT.   -Enhanced supervision per nursing  -Fall precautions  -Continue current/supportive measures     FOLLOW UP / RESULT:    -Monitor effectiveness of IM antipsychotic

## 2024-11-18 NOTE — PATIENT PROFILE ADULT - INTERPRETER NAME
You can access the FollowMyHealth Patient Portal offered by [facility] by registering at the following website: [email or web address]. By joining [facility]’s FollowMyHealth portal, you will also be able to view your health information using other applications (apps) compatible with our system. Brenda Ga

## 2024-11-18 NOTE — CHART NOTE - NSCHARTNOTEFT_GEN_A_CORE
HPI:  An 84 year old female, living w/ son at home, ambulating w/ a cane, w/ Dementia, AAOx 2 to 3 at baseline, w/ PMHx of DM, HTN, and HLD was brought into the ED by EMS due to AMS. Patient AAOx2 to person and place, but does not know why she is in the hospital. Denies chest pain, dyspnea, palpitations, nausea, vomiting, chills, numbness, headache, visual spots, hearing/taste/smell changes, warm sensation, urinary or stool incontinence. Patient mentioned feeling tired. As per daughter, patient was having decreased appetite, nausea, one episode of non bloody emesis, 2 episodes of non bloody, watery stools, abdominal pain, weakness, and confused for the last 2 days. Daughter gave her Prilosec yesterday which resolved her abdominal pain. No recent sick contacts or travel. She does not have any other complaints. (18 Nov 2024 02:16)        OBJECTIVE:  Vital Signs Last 24 Hrs  T(C): 36.8 (18 Nov 2024 10:39), Max: 36.9 (17 Nov 2024 20:19)  T(F): 98.2 (18 Nov 2024 10:39), Max: 98.4 (17 Nov 2024 20:19)  HR: 89 (18 Nov 2024 10:39) (88 - 96)  BP: 115/63 (18 Nov 2024 10:39) (99/50 - 126/82)  BP(mean): --  RR: 16 (18 Nov 2024 05:15) (16 - 18)  SpO2: 95% (18 Nov 2024 10:39) (93% - 96%)    Parameters below as of 18 Nov 2024 10:39  Patient On (Oxygen Delivery Method): room air        LABS:                        8.9    11.33 )-----------( 307      ( 18 Nov 2024 08:14 )             26.3     11-18    142  |  107  |  29[H]  ----------------------------<  63[L]  3.1[L]   |  29  |  0.58    Ca    9.2      18 Nov 2024 08:14  Phos  2.6     11-18  Mg     1.6     11-18    TPro  6.5  /  Alb  3.1[L]  /  TBili  0.2  /  DBili  x   /  AST  12  /  ALT  19  /  AlkPhos  55  11-18      ASSESSMENT:  1 - Sepsis  2 - Duodenitis  3 - Per family at bedside, poor PO intake x 3 days    PLAN:   1 - Blood cultures, continue ABX  2 - GI consult  3 - Start IV fluids    FOLLOW UP/RESULTS:    1 - Blood & urine cultures  2 - GI recs  3 - Trend BMP   AM labs

## 2024-11-18 NOTE — H&P ADULT - PROBLEM SELECTOR PLAN 1
CT ab/pelvis showed Duodenitis and to consider outpatient follow-up with endoscopy to evaluate for underlying ulcer/lesion. Suggestion of pelvic floor weakness with caudal displacement of the uterus and rectum.  F/U Lactate   Negative UA  s/p 1L Bolus NS  Will give 1L Bolus LR  F/U RVP  F/U Procal  F/U CXR  Will start Ceftriaxone empirically

## 2024-11-18 NOTE — CONSULT NOTE ADULT - SUBJECTIVE AND OBJECTIVE BOX
INITIAL GI CONSULTATION    Patient is a 84y old  Female who presents with a chief complaint of Sepsis (18 Nov 2024 02:16)    HPI:  An 84 year old female, living w/ son at home, ambulating w/ a cane, w/ Dementia, AAOx 2 to 3 at baseline, w/ PMHx of DM, HTN, and HLD was brought into the ED by EMS due to AMS. Patient AAOx2 to person and place, but does not know why she is in the hospital. Denies chest pain, dyspnea, palpitations, nausea, vomiting, chills, numbness, headache, visual spots, hearing/taste/smell changes, warm sensation, urinary or stool incontinence. Patient mentioned feeling tired. As per daughter, patient was having decreased appetite, nausea, one episode of non bloody emesis, 2 episodes of non bloody, watery stools, abdominal pain, weakness, and confused for the last 2 days. Daughter gave her Prilosec yesterday which resolved her abdominal pain. No recent sick contacts or travel. She does not have any other complaints. (18 Nov 2024 02:16)    GI HPI: Patient seen and examined on unit. Resting in bed. [place]  used [employer] ID#[id number] but patient did not respond to questioning.     PMH/PSH:  PAST MEDICAL & SURGICAL HISTORY:  DM (diabetes mellitus)      HTN (hypertension)      HLD (hyperlipidemia)      No significant past surgical history            FH:  FAMILY HISTORY:  FHx: heart disease          MEDS:  MEDICATIONS  (STANDING):  atorvastatin 10 milliGRAM(s) Oral at bedtime  cefTRIAXone   IVPB 1000 milliGRAM(s) IV Intermittent every 24 hours  enoxaparin Injectable 40 milliGRAM(s) SubCutaneous every 24 hours  glucagon  Injectable 1 milliGRAM(s) IntraMuscular once  insulin lispro (ADMELOG) corrective regimen sliding scale   SubCutaneous three times a day before meals  insulin lispro (ADMELOG) corrective regimen sliding scale   SubCutaneous at bedtime  lactated ringers. 1000 milliLiter(s) (65 mL/Hr) IV Continuous <Continuous>  metoprolol tartrate 50 milliGRAM(s) Oral two times a day  potassium chloride  10 mEq/100 mL IVPB 10 milliEquivalent(s) IV Intermittent every 1 hour    MEDICATIONS  (PRN):  acetaminophen     Tablet .. 650 milliGRAM(s) Oral every 6 hours PRN Temp greater or equal to 38C (100.4F), Mild Pain (1 - 3)    Allergies    No Known Allergies    Intolerances          ROS: A detailed set of ROS were asked and negative except those outlined in GI HPI.  ______________________________________________________________________  PHYSICAL EXAM:  T(C): 36.8 (11-18-24 @ 10:39), Max: 36.9 (11-17-24 @ 20:19)  HR: 89 (11-18-24 @ 10:39)  BP: 115/63 (11-18-24 @ 10:39)  RR: 16 (11-18-24 @ 05:15)  SpO2: 95% (11-18-24 @ 10:39)  Wt(kg): --      GEN: NAD  HEENT: EOMI, conjunctivae anicteric, neck supple, moist mucous membranes  PULM: LSCTAB, no wheezing, rales, or rhonchi  CV: RRR, no m/r/b  GI: Soft, NT, ND; +BS in all four quadrants, no ascites, no Chisholm's sign  MSK: BROCK, no edema  NEURO: A&O x 3, no gross deficits  ______________________________________________________________________  LABS:                        8.9    11.33 )-----------( 307      ( 18 Nov 2024 08:14 )             26.3     11-18    142  |  107  |  29[H]  ----------------------------<  63[L]  3.1[L]   |  29  |  0.58    Ca    9.2      18 Nov 2024 08:14  Phos  2.6     11-18  Mg     1.6     11-18    TPro  6.5  /  Alb  3.1[L]  /  TBili  0.2  /  DBili  x   /  AST  12  /  ALT  19  /  AlkPhos  55  11-18    LIVER FUNCTIONS - ( 18 Nov 2024 08:14 )  Alb: 3.1 g/dL / Pro: 6.5 g/dL / ALK PHOS: 55 U/L / ALT: 19 U/L DA / AST: 12 U/L / GGT: x           PT/INR - ( 17 Nov 2024 20:55 )   PT: 13.1 sec;   INR: 1.13 ratio         PTT - ( 17 Nov 2024 20:55 )  PTT:25.3 sec  ____________________________________________    IMAGING:    < from: CT Abdomen and Pelvis w/ IV Cont (11.17.24 @ 23:03) >  IMPRESSION:  1.  Duodenitis. Consider outpatient follow-up with endoscopy to evaluate   forunderlying ulcer/lesion.  2.  Suggestion of pelvic floor weakness with caudal displacement of the   uterus and rectum.        This note and its recommendations herein are preliminary until such time as cosigned by an attending.   INITIAL GI CONSULTATION    Patient is a 84y old  Female who presents with a chief complaint of Sepsis (18 Nov 2024 02:16)    HPI:  An 84 year old female, living w/ son at home, ambulating w/ a cane, w/ Dementia, AAOx 2 to 3 at baseline, w/ PMHx of DM, HTN, and HLD was brought into the ED by EMS due to AMS. Patient AAOx2 to person and place, but does not know why she is in the hospital. Denies chest pain, dyspnea, palpitations, nausea, vomiting, chills, numbness, headache, visual spots, hearing/taste/smell changes, warm sensation, urinary or stool incontinence. Patient mentioned feeling tired. As per daughter, patient was having decreased appetite, nausea, one episode of non bloody emesis, 2 episodes of non bloody, watery stools, abdominal pain, weakness, and confused for the last 2 days. Daughter gave her Prilosec yesterday which resolved her abdominal pain. No recent sick contacts or travel. She does not have any other complaints. (18 Nov 2024 02:16)    GI HPI: Patient seen and examined on unit. Resting in bed. [place]  used [employer] ID#[id number] but patient did not respond to questioning.     PMH/PSH:  PAST MEDICAL & SURGICAL HISTORY:  DM (diabetes mellitus)      HTN (hypertension)      HLD (hyperlipidemia)      No significant past surgical history            FH:  FAMILY HISTORY:  FHx: heart disease          MEDS:  MEDICATIONS  (STANDING):  atorvastatin 10 milliGRAM(s) Oral at bedtime  cefTRIAXone   IVPB 1000 milliGRAM(s) IV Intermittent every 24 hours  enoxaparin Injectable 40 milliGRAM(s) SubCutaneous every 24 hours  glucagon  Injectable 1 milliGRAM(s) IntraMuscular once  insulin lispro (ADMELOG) corrective regimen sliding scale   SubCutaneous three times a day before meals  insulin lispro (ADMELOG) corrective regimen sliding scale   SubCutaneous at bedtime  lactated ringers. 1000 milliLiter(s) (65 mL/Hr) IV Continuous <Continuous>  metoprolol tartrate 50 milliGRAM(s) Oral two times a day  potassium chloride  10 mEq/100 mL IVPB 10 milliEquivalent(s) IV Intermittent every 1 hour    MEDICATIONS  (PRN):  acetaminophen     Tablet .. 650 milliGRAM(s) Oral every 6 hours PRN Temp greater or equal to 38C (100.4F), Mild Pain (1 - 3)    Allergies    No Known Allergies    Intolerances          ROS: A detailed set of ROS were asked and negative except those outlined in GI HPI.  ______________________________________________________________________  PHYSICAL EXAM:  T(C): 36.8 (11-18-24 @ 10:39), Max: 36.9 (11-17-24 @ 20:19)  HR: 89 (11-18-24 @ 10:39)  BP: 115/63 (11-18-24 @ 10:39)  RR: 16 (11-18-24 @ 05:15)  SpO2: 95% (11-18-24 @ 10:39)  Wt(kg): --      GEN: NAD  HEENT: EOMI, conjunctivae anicteric, neck supple, moist mucous membranes  PULM: LSCTAB, no wheezing, rales, or rhonchi  CV: RRR, no m/r/b  GI: Soft, NT, ND; +BS in all four quadrants, no ascites, no Chisholm's sign  MSK: BROCK, no edema  NEURO: can't assess mental status, pt uncooperative with exam though  phone used - did not answer questions posed  ______________________________________________________________________  LABS:                        8.9    11.33 )-----------( 307      ( 18 Nov 2024 08:14 )             26.3     11-18    142  |  107  |  29[H]  ----------------------------<  63[L]  3.1[L]   |  29  |  0.58    Ca    9.2      18 Nov 2024 08:14  Phos  2.6     11-18  Mg     1.6     11-18    TPro  6.5  /  Alb  3.1[L]  /  TBili  0.2  /  DBili  x   /  AST  12  /  ALT  19  /  AlkPhos  55  11-18    LIVER FUNCTIONS - ( 18 Nov 2024 08:14 )  Alb: 3.1 g/dL / Pro: 6.5 g/dL / ALK PHOS: 55 U/L / ALT: 19 U/L DA / AST: 12 U/L / GGT: x           PT/INR - ( 17 Nov 2024 20:55 )   PT: 13.1 sec;   INR: 1.13 ratio         PTT - ( 17 Nov 2024 20:55 )  PTT:25.3 sec  ____________________________________________    IMAGING:    < from: CT Abdomen and Pelvis w/ IV Cont (11.17.24 @ 23:03) >  IMPRESSION:  1.  Duodenitis. Consider outpatient follow-up with endoscopy to evaluate   forunderlying ulcer/lesion.  2.  Suggestion of pelvic floor weakness with caudal displacement of the   uterus and rectum.        This note and its recommendations herein are preliminary until such time as cosigned by an attending.

## 2024-11-18 NOTE — PATIENT PROFILE ADULT - FALL HARM RISK - HARM RISK INTERVENTIONS

## 2024-11-18 NOTE — CONSULT NOTE ADULT - SUBJECTIVE AND OBJECTIVE BOX
HPI:  An 84 year old female, living w/ son at home, ambulating w/ a cane, w/ Dementia, AAOx 2 to 3 at baseline, w/ PMHx of DM, HTN, and HLD was brought into the ED by EMS due to AMS. Patient AAOx2 to person and place, but does not know why she is in the hospital. Denies chest pain, dyspnea, palpitations, nausea, vomiting, chills, numbness, headache, visual spots, hearing/taste/smell changes, warm sensation, urinary or stool incontinence. Patient mentioned feeling tired. As per daughter, patient was having decreased appetite, nausea, one episode of non bloody emesis, 2 episodes of non bloody, watery stools, abdominal pain, weakness, and confused for the last 2 days. Daughter gave her Prilosec yesterday which resolved her abdominal pain. No recent sick contacts or travel. She does not have any other complaints. (18 Nov 2024 02:16)          PAST MEDICAL & SURGICAL HISTORY:  DM (diabetes mellitus)      HTN (hypertension)      HLD (hyperlipidemia)      No significant past surgical history          No Known Allergies      Meds:  acetaminophen     Tablet .. 650 milliGRAM(s) Oral every 6 hours PRN  atorvastatin 10 milliGRAM(s) Oral at bedtime  cefTRIAXone   IVPB 1000 milliGRAM(s) IV Intermittent every 24 hours  enoxaparin Injectable 40 milliGRAM(s) SubCutaneous every 24 hours  glucagon  Injectable 1 milliGRAM(s) IntraMuscular once  insulin lispro (ADMELOG) corrective regimen sliding scale   SubCutaneous three times a day before meals  insulin lispro (ADMELOG) corrective regimen sliding scale   SubCutaneous at bedtime  lactated ringers. 1000 milliLiter(s) IV Continuous <Continuous>  metoprolol tartrate 50 milliGRAM(s) Oral two times a day      SOCIAL HISTORY:  Smoker:  YES / NO        PACK YEARS:                         WHEN QUIT?  ETOH use:  YES / NO               FREQUENCY / QUANTITY:  [employer] Drug use:  YES / NO  Occupation:  Assisted device use (Cane / Walker):  Live with:    FAMILY HISTORY:  FHx: heart disease        VITALS:  Vital Signs Last 24 Hrs  T(C): 36.5 (18 Nov 2024 13:58), Max: 36.9 (17 Nov 2024 20:19)  T(F): 97.7 (18 Nov 2024 13:58), Max: 98.4 (17 Nov 2024 20:19)  HR: 87 (18 Nov 2024 13:58) (87 - 96)  BP: 111/72 (18 Nov 2024 13:58) (99/50 - 126/82)  BP(mean): --  RR: 17 (18 Nov 2024 13:58) (16 - 18)  SpO2: 96% (18 Nov 2024 13:58) (93% - 96%)    Parameters below as of 18 Nov 2024 13:58  Patient On (Oxygen Delivery Method): room air        LABS/DIAGNOSTIC TESTS:                          8.9    11.33 )-----------( 307      ( 18 Nov 2024 08:14 )             26.3     WBC Count: 11.33 K/uL (11-18 @ 08:14)  WBC Count: 12.48 K/uL (11-17 @ 20:55)      11-18    142  |  107  |  29[H]  ----------------------------<  63[L]  3.1[L]   |  29  |  0.58    Ca    9.2      18 Nov 2024 08:14  Phos  2.6     11-18  Mg     1.6     11-18    TPro  6.5  /  Alb  3.1[L]  /  TBili  0.2  /  DBili  x   /  AST  12  /  ALT  19  /  AlkPhos  55  11-18      Urinalysis Basic - ( 18 Nov 2024 08:14 )    Color: x / Appearance: x / SG: x / pH: x  Gluc: 63 mg/dL / Ketone: x  / Bili: x / Urobili: x   Blood: x / Protein: x / Nitrite: x   Leuk Esterase: x / RBC: x / WBC x   Sq Epi: x / Non Sq Epi: x / Bacteria: x        LIVER FUNCTIONS - ( 18 Nov 2024 08:14 )  Alb: 3.1 g/dL / Pro: 6.5 g/dL / ALK PHOS: 55 U/L / ALT: 19 U/L DA / AST: 12 U/L / GGT: x             PT/INR - ( 17 Nov 2024 20:55 )   PT: 13.1 sec;   INR: 1.13 ratio         PTT - ( 17 Nov 2024 20:55 )  PTT:25.3 sec    LACTATE:Lactate, Blood: 0.8 mmol/L (11-18 @ 08:14)      ABG -     CULTURES:       RADIOLOGY:< from: Xray Chest 1 View- PORTABLE-Urgent (Xray Chest 1 View- PORTABLE-Urgent .) (11.18.24 @ 04:38) >  ACC: [id number] EXAM:  XR CHEST PORTABLE URGENT 1V   ORDERED BY: RICARDO OROURKE     PROCEDURE DATE:  11/18/2024          INTERPRETATION:  EXAM:XR CHEST URGENT.     CLINICAL INDICATION: Eval for infiltrates .     TECHNIQUE: Portable AP view.     PRIOR EXAM: None.     FINDINGS:     Visualized lung fields are clear. Magnified cardiac and mediastinal   silhouette is unremarkable. 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      IMPRESSION:    No acute lung disease.    --- End of Report ---            BERKLEY HAYES MD; Attending Radiologist  This document has been electronically signed. Nov 18 2024  2:32PM    < end of copied text >  -------------------------------------------------------------------------------------------------------------------------  ACC: [id number] EXAM:  CT ABDOMEN AND PELVIS IC   ORDERED BY: MEGAN MOLINA     PROCEDURE DATE:  11/17/2024          INTERPRETATION:  CLINICAL INFORMATION: Abdominal pain.    COMPARISON: None.    CONTRAST/COMPLICATIONS:  IV Contrast: Omnipaque 350  90 ccadministered   10 cc discarded  Oral Contrast: None      PROCEDURE:  CT of the Abdomen and Pelvis was performed.  Sagittal and coronal reformats were performed.    FINDINGS:  LOWER CHEST: Coronary and aortic valve calcifications.    LIVER: Within normal limits.  BILE DUCTS: Normal caliber.  GALLBLADDER: Within normal limits.  SPLEEN: Within normal limits.  PANCREAS: Few punctate calcifications within the pancreatic body may   represent chronic sequelae of pancreatitis.  ADRENALS: Within normal limits.  KIDNEYS/URETERS: Symmetric renal enhancement. No hydronephrosis. 1.8 cm   left lower pole cyst. 1.7 cm intermediate density right lower pole   lesion, probably complex cyst. Subcentimeter hypodensity in the right   mid/lower renal cortex is toosmall to characterize.    BLADDER: Distended.  REPRODUCTIVE ORGANS: Caudal displacement of the uterus. Uterus and adnexa   are otherwise within normal limits.    BOWEL: No bowel obstruction. Caudal displacement of the rectum. Appendix   is normal. Wall thickening of the first, second, and third duodenal   segments with surrounding fat stranding. No extraluminal collection or   free air.  PERITONEUM/RETROPERITONEUM: No ascites or pneumoperitoneum.  VESSELS: Atherosclerotic changes.  LYMPH NODES: No lymphadenopathy.  ABDOMINAL WALL: Within normal limits.  BONES: Degenerative changes, most severe in the lumbar spine. Mild   thoracolumbar levocurvature.    IMPRESSION:  1.  Duodenitis. Consider outpatient follow-up with endoscopy to evaluate   forunderlying ulcer/lesion.  2.  Suggestion of pelvic floor weakness with caudal displacement of the   uterus and rectum.        --- End of Report ---            MAGY MEJIA MD; Attending Radiologist  This document has been electronically signed. Nov 172024 11:59PM    < end of copied text >  ---------------------------------------------------------------------------------------------------------------  ACC: [id number] EXAM:  CT BRAIN   ORDERED BY: MEGAN MOLIAN     PROCEDURE DATE:  11/17/2024          INTERPRETATION:  CLINICAL INFORMATION: ams x 4 days    COMPARISON: None.    CONTRAST:  IV Contrast: NONE      TECHNIQUE:  Serial axial images were obtained from the skull base to the   vertex using multi-slice helical technique. Sagittal and coronal   reformats were obtained.    FINDINGS:    VENTRICLES AND SULCI: Age appropriate involutional changes.  INTRA-AXIAL: No mass effect, acute hemorrhage, or midline shift.  There   are periventricular and subcortical white matter hypodensities,   consistent with microvascular type changes. Left basal ganglia chronic   lacunar infarct.  EXTRA-AXIAL: No mass or fluid collection. Basal cisterns are normal in  appearance.    VISUALIZED SINUSES:  Clear.  TYMPANOMASTOID CAVITIES:  Clear.  VISUALIZED ORBITS: Bilateral lens replacement.  CALVARIUM: Intact.    MISCELLANEOUS: Atherosclerotic calcifications of the intracranial   vasculature.      IMPRESSION:  No acute intracranial hemorrhage, mass effect, or midline shift.        --- End of Report ---            LUIS CARLOS STEWARD MD; Attending Radiologist  This document has been electronically signed. Nov 17 2024 11:49PM    < end of copied text >        ROS  [  ] UNABLE TO ELICIT               HPI:  An 84 year old female, living w/ son at home, ambulating w/ a cane, w/ Dementia, AAOx 2 to 3 at baseline, w/ PMHx of DM, HTN, and HLD was brought into the ED by EMS due to AMS. Patient AAOx2 to person and place, but does not know why she is in the hospital. Denies chest pain, dyspnea, palpitations, nausea, vomiting, chills, numbness, headache, visual spots, hearing/taste/smell changes, warm sensation, urinary or stool incontinence. Patient mentioned feeling tired. As per daughter, patient was having decreased appetite, nausea, one episode of non bloody emesis, 2 episodes of non bloody, watery stools, abdominal pain, weakness, and confused for the last 2 days. Daughter gave her Prilosec yesterday which resolved her abdominal pain. No recent sick contacts or travel. She does not have any other complaints. (18 Nov 2024 02:16)        History as above, asked to see this patient who presented from home with nausea , vomiting and abdominal pain and altered mental status x 2 days, she is very confused currently and and not talking to me but is following some simple commands such as taking a deep breath when asked to. She has some underlying dementia which has gotten a little worse. Asked to see this patient as she was found to have an elevated WBC count but no fevers and her u/a, cxr and CT abdomen are all negative for a source of infection.          PAST MEDICAL & SURGICAL HISTORY:  DM (diabetes mellitus)      HTN (hypertension)      HLD (hyperlipidemia)      No significant past surgical history          No Known Allergies      Meds:  acetaminophen     Tablet .. 650 milliGRAM(s) Oral every 6 hours PRN  atorvastatin 10 milliGRAM(s) Oral at bedtime  cefTRIAXone   IVPB 1000 milliGRAM(s) IV Intermittent every 24 hours  enoxaparin Injectable 40 milliGRAM(s) SubCutaneous every 24 hours  glucagon  Injectable 1 milliGRAM(s) IntraMuscular once  insulin lispro (ADMELOG) corrective regimen sliding scale   SubCutaneous three times a day before meals  insulin lispro (ADMELOG) corrective regimen sliding scale   SubCutaneous at bedtime  lactated ringers. 1000 milliLiter(s) IV Continuous <Continuous>  metoprolol tartrate 50 milliGRAM(s) Oral two times a day      SOCIAL HISTORY: unknown    FAMILY HISTORY:  FHx: heart disease        VITALS:  Vital Signs Last 24 Hrs  T(C): 36.5 (18 Nov 2024 13:58), Max: 36.9 (17 Nov 2024 20:19)  T(F): 97.7 (18 Nov 2024 13:58), Max: 98.4 (17 Nov 2024 20:19)  HR: 87 (18 Nov 2024 13:58) (87 - 96)  BP: 111/72 (18 Nov 2024 13:58) (99/50 - 126/82)  BP(mean): --  RR: 17 (18 Nov 2024 13:58) (16 - 18)  SpO2: 96% (18 Nov 2024 13:58) (93% - 96%)    Parameters below as of 18 Nov 2024 13:58  Patient On (Oxygen Delivery Method): room air        LABS/DIAGNOSTIC TESTS:                          8.9    11.33 )-----------( 307      ( 18 Nov 2024 08:14 )             26.3     WBC Count: 11.33 K/uL (11-18 @ 08:14)  WBC Count: 12.48 K/uL (11-17 @ 20:55)      11-18    142  |  107  |  29[H]  ----------------------------<  63[L]  3.1[L]   |  29  |  0.58    Ca    9.2      18 Nov 2024 08:14  Phos  2.6     11-18  Mg     1.6     11-18    TPro  6.5  /  Alb  3.1[L]  /  TBili  0.2  /  DBili  x   /  AST  12  /  ALT  19  /  AlkPhos  55  11-18      Urine Microscopic-Add On (NC) (11.18.24 @ 00:51)   Red Blood Cell - Urine: 0 /HPF  White Blood Cell - Urine: 3 /HPFUrinalysis with Rflx Culture (11.18.24 @ 00:51)   Urine Appearance: Clear  Color: Yellow  Specific Gravity: 1.029  pH Urine: 5.0  Protein, Urine: 100 mg/dL  Glucose Qualitative, Urine: >=1000 mg/dL  Ketone - Urine: Negative mg/dL  Blood, Urine: Negative  Bilirubin: Negative  Urobilinogen: 0.2 mg/dL  Leukocyte Esterase Concentration: Negative  Nitrite: Negative      LIVER FUNCTIONS - ( 18 Nov 2024 08:14 )  Alb: 3.1 g/dL / Pro: 6.5 g/dL / ALK PHOS: 55 U/L / ALT: 19 U/L DA / AST: 12 U/L / GGT: x             PT/INR - ( 17 Nov 2024 20:55 )   PT: 13.1 sec;   INR: 1.13 ratio         PTT - ( 17 Nov 2024 20:55 )  PTT:25.3 sec    LACTATE:Lactate, Blood: 0.8 mmol/L (11-18 @ 08:14)      ABG -     CULTURES:       RADIOLOGY:< from: Xray Chest 1 View- PORTABLE-Urgent (Xray Chest 1 View- PORTABLE-Urgent .) (11.18.24 @ 04:38) >  ACC: [id number] EXAM:  XR CHEST PORTABLE URGENT 1V   ORDERED BY: RICARDO OROURKE     PROCEDURE DATE:  11/18/2024          INTERPRETATION:  EXAM:XR CHEST URGENT.     CLINICAL INDICATION: Eval for infiltrates .     TECHNIQUE: Portable AP view.     PRIOR EXAM: None.     FINDINGS:     Visualized lung fields are clear. Magnified cardiac and mediastinal   silhouette is unremarkable. 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      IMPRESSION:    No acute lung disease.    --- End of Report ---            BERKLEY HAYES MD; Attending Radiologist  This document has been electronically signed. Nov 18 2024  2:32PM    < end of copied text >  -------------------------------------------------------------------------------------------------------------------------  ACC: [id number] EXAM:  CT ABDOMEN AND PELVIS IC   ORDERED BY: MEGAN MOLINA     PROCEDURE DATE:  11/17/2024          INTERPRETATION:  CLINICAL INFORMATION: Abdominal pain.    COMPARISON: None.    CONTRAST/COMPLICATIONS:  IV Contrast: Omnipaque 350  90 ccadministered   10 cc discarded  Oral Contrast: None      PROCEDURE:  CT of the Abdomen and Pelvis was performed.  Sagittal and coronal reformats were performed.    FINDINGS:  LOWER CHEST: Coronary and aortic valve calcifications.    LIVER: Within normal limits.  BILE DUCTS: Normal caliber.  GALLBLADDER: Within normal limits.  SPLEEN: Within normal limits.  PANCREAS: Few punctate calcifications within the pancreatic body may   represent chronic sequelae of pancreatitis.  ADRENALS: Within normal limits.  KIDNEYS/URETERS: Symmetric renal enhancement. No hydronephrosis. 1.8 cm   left lower pole cyst. 1.7 cm intermediate density right lower pole   lesion, probably complex cyst. Subcentimeter hypodensity in the right   mid/lower renal cortex is toosmall to characterize.    BLADDER: Distended.  REPRODUCTIVE ORGANS: Caudal displacement of the uterus. Uterus and adnexa   are otherwise within normal limits.    BOWEL: No bowel obstruction. Caudal displacement of the rectum. Appendix   is normal. Wall thickening of the first, second, and third duodenal   segments with surrounding fat stranding. No extraluminal collection or   free air.  PERITONEUM/RETROPERITONEUM: No ascites or pneumoperitoneum.  VESSELS: Atherosclerotic changes.  LYMPH NODES: No lymphadenopathy.  ABDOMINAL WALL: Within normal limits.  BONES: Degenerative changes, most severe in the lumbar spine. Mild   thoracolumbar levocurvature.    IMPRESSION:  1.  Duodenitis. Consider outpatient follow-up with endoscopy to evaluate   forunderlying ulcer/lesion.  2.  Suggestion of pelvic floor weakness with caudal displacement of the   uterus and rectum.        --- End of Report ---            MAGY MEJIA MD; Attending Radiologist  This document has been electronically signed. Nov 172024 11:59PM    < end of copied text >  ---------------------------------------------------------------------------------------------------------------  ACC: [id number] EXAM:  CT BRAIN   ORDERED BY: MEGAN MOLINA     PROCEDURE DATE:  11/17/2024          INTERPRETATION:  CLINICAL INFORMATION: ams x 4 days    COMPARISON: None.    CONTRAST:  IV Contrast: NONE      TECHNIQUE:  Serial axial images were obtained from the skull base to the   vertex using multi-slice helical technique. Sagittal and coronal   reformats were obtained.    FINDINGS:    VENTRICLES AND SULCI: Age appropriate involutional changes.  INTRA-AXIAL: No mass effect, acute hemorrhage, or midline shift.  There   are periventricular and subcortical white matter hypodensities,   consistent with microvascular type changes. Left basal ganglia chronic   lacunar infarct.  EXTRA-AXIAL: No mass or fluid collection. Basal cisterns are normal in  appearance.    VISUALIZED SINUSES:  Clear.  TYMPANOMASTOID CAVITIES:  Clear.  VISUALIZED ORBITS: Bilateral lens replacement.  CALVARIUM: Intact.    MISCELLANEOUS: Atherosclerotic calcifications of the intracranial   vasculature.      IMPRESSION:  No acute intracranial hemorrhage, mass effect, or midline shift.        --- End of Report ---            LUIS CARLOS STEWARD MD; Attending Radiologist  This document has been electronically signed. Nov 17 2024 11:49PM    < end of copied text >        ROS  [ x ] UNABLE TO ELICIT

## 2024-11-18 NOTE — CONSULT NOTE ADULT - PROBLEM SELECTOR RECOMMENDATION 9
-CTAP noted above,  Duodenitis. Consider outpatient follow-up with endoscopy to evaluate for underlying   ulcer/lesion.  -WBC elevated, trending down  -c/w abx  -outpt follow-up    This note and its recommendations herein are preliminary until such time as cosigned by an attending.

## 2024-11-18 NOTE — H&P ADULT - NSHPPHYSICALEXAM_GEN_ALL_CORE
PHYSICAL EXAMINATION:  GENERAL: NAD, lying comfortable in bed, elderly   HEAD:  Atraumatic, Normocephalic  ENT: Conjunctiva and sclera clear, pupils are equal, round, and reactive to light and accommodation. Dry mucous membranes  NECK: Supple, No JVD, trachea is midline, no evidence of thyroid enlargement, no lymphadenopathy or tenderness.  CHEST/LUNG: Clear to auscultation; No rales, rhonchi, wheezing, or rubs  HEART: Regular rate and rhythm; No murmurs, rubs, or gallops  ABDOMEN: Soft, Nontender, Nondistended; Bowel sounds present  NERVOUS SYSTEM:  Alert & Oriented X2 to person and place; No obvious focal sensory or motor deficits are noted; Recent and remote memory is fair, Appropriate mood and affect. Negative Brudzinski's sign and Kernig's sign  EXTREMITIES:  2+ Peripheral Pulses, No clubbing, cyanosis, or edema  SKIN: Warm, dry, and well perfused; Good turgor; No lesions, nodules or rashes are noted.     Vital Signs Last 24 Hrs  T(C): 36.9 (17 Nov 2024 20:19), Max: 36.9 (17 Nov 2024 20:19)  T(F): 98.4 (17 Nov 2024 20:19), Max: 98.4 (17 Nov 2024 20:19)  HR: 96 (17 Nov 2024 20:19) (96 - 96)  BP: 120/78 (17 Nov 2024 20:19) (120/78 - 120/78)  BP(mean): --  RR: 18 (17 Nov 2024 20:19) (18 - 18)  SpO2: 95% (17 Nov 2024 20:19) (95% - 95%)    Parameters below as of 17 Nov 2024 20:19  Patient On (Oxygen Delivery Method): room air

## 2024-11-18 NOTE — CONSULT NOTE ADULT - NS ATTEND AMEND GEN_ALL_CORE FT
84 F h/o dementia, DM, HTN, HL here with AMS.   CT with duodenitis.   Pt can't cooperate with interview to describe sx.   Called daughter twice - first time she asked me to call her back, 2nd time went to voicemail.   Plan   would give PPI BID - IV initially ok  Would assess ability to reach nutrition goals - calorie count  Would prefer not to scope this elderly demented patient unless she is acutely sxatic from the duodenitis--in pain, losing weight, nauseous, etc.   please continue to trend Hb  GI will follow to understand sx with more clarity

## 2024-11-18 NOTE — H&P ADULT - PROBLEM SELECTOR PLAN 4
Hold PO antiglycemic  MORNING TEAM TO CONFIRM INSULIN REGIME  Will start ISS  Finger stick before meal and bedtime   F/U HA1c  Diabetic diet.

## 2024-11-18 NOTE — CONSULT NOTE ADULT - ASSESSMENT
84 year old female, living w/ son at home, ambulating w/ a cane, w/ Dementia, AAOx 2 to 3 at baseline, w/ PMHx of DM, HTN, and HLD was brought into the ED by EMS due to AMS. Gi consulted for duodenitis

## 2024-11-19 DIAGNOSIS — Z75.8 OTHER PROBLEMS RELATED TO MEDICAL FACILITIES AND OTHER HEALTH CARE: ICD-10-CM

## 2024-11-19 LAB
ABO RH CONFIRMATION: SIGNIFICANT CHANGE UP
ANION GAP SERPL CALC-SCNC: 8 MMOL/L — SIGNIFICANT CHANGE UP (ref 5–17)
ANISOCYTOSIS BLD QL: SLIGHT — SIGNIFICANT CHANGE UP
BLD GP AB SCN SERPL QL: SIGNIFICANT CHANGE UP
BUN SERPL-MCNC: 27 MG/DL — HIGH (ref 7–18)
CALCIUM SERPL-MCNC: 9.2 MG/DL — SIGNIFICANT CHANGE UP (ref 8.4–10.5)
CHLORIDE SERPL-SCNC: 106 MMOL/L — SIGNIFICANT CHANGE UP (ref 96–108)
CO2 SERPL-SCNC: 25 MMOL/L — SIGNIFICANT CHANGE UP (ref 22–31)
CREAT SERPL-MCNC: 0.65 MG/DL — SIGNIFICANT CHANGE UP (ref 0.5–1.3)
EGFR: 86 ML/MIN/1.73M2 — SIGNIFICANT CHANGE UP
GLUCOSE BLDC GLUCOMTR-MCNC: 255 MG/DL — HIGH (ref 70–99)
GLUCOSE BLDC GLUCOMTR-MCNC: 277 MG/DL — HIGH (ref 70–99)
GLUCOSE BLDC GLUCOMTR-MCNC: 320 MG/DL — HIGH (ref 70–99)
GLUCOSE BLDC GLUCOMTR-MCNC: 378 MG/DL — HIGH (ref 70–99)
GLUCOSE SERPL-MCNC: 248 MG/DL — HIGH (ref 70–99)
HCT VFR BLD CALC: 20.3 % — CRITICAL LOW (ref 34.5–45)
HCT VFR BLD CALC: 26.3 % — LOW (ref 34.5–45)
HGB BLD-MCNC: 6.8 G/DL — CRITICAL LOW (ref 11.5–15.5)
HGB BLD-MCNC: 8.7 G/DL — LOW (ref 11.5–15.5)
MAGNESIUM SERPL-MCNC: 1.7 MG/DL — SIGNIFICANT CHANGE UP (ref 1.6–2.6)
MANUAL SMEAR VERIFICATION: SIGNIFICANT CHANGE UP
MCHC RBC-ENTMCNC: 30.6 PG — SIGNIFICANT CHANGE UP (ref 27–34)
MCHC RBC-ENTMCNC: 31.6 PG — SIGNIFICANT CHANGE UP (ref 27–34)
MCHC RBC-ENTMCNC: 33.1 G/DL — SIGNIFICANT CHANGE UP (ref 32–36)
MCHC RBC-ENTMCNC: 33.5 G/DL — SIGNIFICANT CHANGE UP (ref 32–36)
MCV RBC AUTO: 92.6 FL — SIGNIFICANT CHANGE UP (ref 80–100)
MCV RBC AUTO: 94.4 FL — SIGNIFICANT CHANGE UP (ref 80–100)
MICROCYTES BLD QL: SLIGHT — SIGNIFICANT CHANGE UP
NRBC # BLD: 0 /100 WBCS — SIGNIFICANT CHANGE UP (ref 0–0)
NRBC # BLD: 0 /100 WBCS — SIGNIFICANT CHANGE UP (ref 0–0)
PHOSPHATE SERPL-MCNC: 3 MG/DL — SIGNIFICANT CHANGE UP (ref 2.5–4.5)
PLAT MORPH BLD: NORMAL — SIGNIFICANT CHANGE UP
PLATELET # BLD AUTO: 342 K/UL — SIGNIFICANT CHANGE UP (ref 150–400)
PLATELET # BLD AUTO: 357 K/UL — SIGNIFICANT CHANGE UP (ref 150–400)
POIKILOCYTOSIS BLD QL AUTO: SLIGHT — SIGNIFICANT CHANGE UP
POTASSIUM SERPL-MCNC: 3.9 MMOL/L — SIGNIFICANT CHANGE UP (ref 3.5–5.3)
POTASSIUM SERPL-SCNC: 3.9 MMOL/L — SIGNIFICANT CHANGE UP (ref 3.5–5.3)
RBC # BLD: 2.15 M/UL — LOW (ref 3.8–5.2)
RBC # BLD: 2.84 M/UL — LOW (ref 3.8–5.2)
RBC # FLD: 13.3 % — SIGNIFICANT CHANGE UP (ref 10.3–14.5)
RBC # FLD: 13.4 % — SIGNIFICANT CHANGE UP (ref 10.3–14.5)
RBC BLD AUTO: ABNORMAL
SODIUM SERPL-SCNC: 139 MMOL/L — SIGNIFICANT CHANGE UP (ref 135–145)
SPHEROCYTES BLD QL SMEAR: SLIGHT — SIGNIFICANT CHANGE UP
WBC # BLD: 11.42 K/UL — HIGH (ref 3.8–10.5)
WBC # BLD: 12.17 K/UL — HIGH (ref 3.8–10.5)
WBC # FLD AUTO: 11.42 K/UL — HIGH (ref 3.8–10.5)
WBC # FLD AUTO: 12.17 K/UL — HIGH (ref 3.8–10.5)

## 2024-11-19 RX ORDER — METOPROLOL TARTRATE 100 MG/1
2.5 TABLET, FILM COATED ORAL ONCE
Refills: 0 | Status: COMPLETED | OUTPATIENT
Start: 2024-11-19 | End: 2024-11-19

## 2024-11-19 RX ORDER — PANTOPRAZOLE SODIUM 40 MG/1
40 TABLET, DELAYED RELEASE ORAL EVERY 12 HOURS
Refills: 0 | Status: DISCONTINUED | OUTPATIENT
Start: 2024-11-19 | End: 2024-11-19

## 2024-11-19 RX ORDER — PANTOPRAZOLE SODIUM 40 MG/1
40 TABLET, DELAYED RELEASE ORAL EVERY 12 HOURS
Refills: 0 | Status: DISCONTINUED | OUTPATIENT
Start: 2024-11-19 | End: 2024-11-20

## 2024-11-19 RX ORDER — METOPROLOL TARTRATE 100 MG/1
50 TABLET, FILM COATED ORAL ONCE
Refills: 0 | Status: DISCONTINUED | OUTPATIENT
Start: 2024-11-19 | End: 2024-11-19

## 2024-11-19 RX ORDER — SODIUM CHLORIDE 9 MG/ML
1000 INJECTION, SOLUTION INTRAMUSCULAR; INTRAVENOUS; SUBCUTANEOUS
Refills: 0 | Status: DISCONTINUED | OUTPATIENT
Start: 2024-11-19 | End: 2024-11-20

## 2024-11-19 RX ADMIN — ENOXAPARIN SODIUM 40 MILLIGRAM(S): 30 INJECTION SUBCUTANEOUS at 05:11

## 2024-11-19 RX ADMIN — SODIUM CHLORIDE 75 MILLILITER(S): 9 INJECTION, SOLUTION INTRAMUSCULAR; INTRAVENOUS; SUBCUTANEOUS at 16:38

## 2024-11-19 RX ADMIN — PANTOPRAZOLE SODIUM 40 MILLIGRAM(S): 40 TABLET, DELAYED RELEASE ORAL at 17:50

## 2024-11-19 RX ADMIN — Medication 100 MILLIGRAM(S): at 05:13

## 2024-11-19 RX ADMIN — SODIUM CHLORIDE 75 MILLILITER(S): 9 INJECTION, SOLUTION INTRAMUSCULAR; INTRAVENOUS; SUBCUTANEOUS at 22:30

## 2024-11-19 RX ADMIN — Medication 2: at 22:30

## 2024-11-19 RX ADMIN — Medication 5: at 16:37

## 2024-11-19 RX ADMIN — METOPROLOL TARTRATE 2.5 MILLIGRAM(S): 100 TABLET, FILM COATED ORAL at 23:28

## 2024-11-19 NOTE — PROGRESS NOTE ADULT - PROBLEM SELECTOR PLAN 2
CTAP noted above  Follow up with GI as outpatient  Dr. Galdamez, GI, followed  Recommendations appreciated

## 2024-11-19 NOTE — PROGRESS NOTE ADULT - PROBLEM SELECTOR PLAN 1
CXR noted above  Blood cultures pending  UA (-)  Continue Ceftriaxone  Dr. Fortune, ID, following  Recommendations appreciated

## 2024-11-19 NOTE — PROGRESS NOTE ADULT - SUBJECTIVE AND OBJECTIVE BOX
HPI:  An 84 year old female, living w/ son at home, ambulating w/ a cane, w/ Dementia, AAOx 2 to 3 at baseline, w/ PMHx of DM, HTN, and HLD was brought into the ED by EMS due to AMS. Patient AAOx2 to person and place, but does not know why she is in the hospital. Denies chest pain, dyspnea, palpitations, nausea, vomiting, chills, numbness, headache, visual spots, hearing/taste/smell changes, warm sensation, urinary or stool incontinence. Patient mentioned feeling tired. As per daughter, patient was having decreased appetite, nausea, one episode of non bloody emesis, 2 episodes of non bloody, watery stools, abdominal pain, weakness, and confused for the last 2 days. Daughter gave her Prilosec yesterday which resolved her abdominal pain. No recent sick contacts or travel. She does not have any other complaints. (18 Nov 2024 02:16)      OVERNIGHT EVENTS:    No new overnight events.  Seen and examined at bedside.     REVIEW OF SYSTEMS:      EYES: no acute visual disturbances  NECK: No pain or stiffness  RESPIRATORY: No cough; No shortness of breath  CARDIOVASCULAR: No chest pain, no palpitations  GASTROINTESTINAL: No pain. No nausea, vomiting or diarrhea   NEUROLOGICAL: No headache or numbness, no tremors  MUSCULOSKELETAL: No joint pain, no muscle pain  GENITOURINARY: no dysuria, no frequency, no hesitancy  PSYCHIATRY: no depression, no anxiety  ALL OTHER  ROS negative        Vital Signs Last 24 Hrs  T(C): 36.6 (19 Nov 2024 13:14), Max: 36.7 (19 Nov 2024 05:23)  T(F): 97.8 (19 Nov 2024 13:14), Max: 98 (19 Nov 2024 05:23)  HR: 90 (19 Nov 2024 13:14) (74 - 90)  BP: 136/80 (19 Nov 2024 13:14) (111/72 - 147/83)  BP(mean): --  RR: 19 (19 Nov 2024 13:14) (17 - 19)  SpO2: 97% (19 Nov 2024 13:14) (96% - 98%)    Parameters below as of 19 Nov 2024 13:14  Patient On (Oxygen Delivery Method): room air        ________________________________________________  PHYSICAL EXAM:    GENERAL: NAD  HEENT: Normocephalic; conjunctivae and sclerae clear;  NECK : supple, no JVD  CHEST/LUNG: Clear to auscultation; Nonlabored  HEART: S1 S2  regular  ABDOMEN: Soft, Nontender, Nondistended; Bowel sounds present  EXTREMITIES: no cyanosis; no LE edema; no calf tenderness  NERVOUS SYSTEM:  Alert; no new deficits  SKIN: warm and dry; No new rashes or lesions    _________________________________________________  CURRENT MEDICATIONS:    MEDICATIONS  (STANDING):  atorvastatin 10 milliGRAM(s) Oral at bedtime  cefTRIAXone   IVPB 1000 milliGRAM(s) IV Intermittent every 24 hours  enoxaparin Injectable 40 milliGRAM(s) SubCutaneous every 24 hours  glucagon  Injectable 1 milliGRAM(s) IntraMuscular once  metoprolol tartrate 50 milliGRAM(s) Oral two times a day    MEDICATIONS  (PRN):  acetaminophen     Tablet .. 650 milliGRAM(s) Oral every 6 hours PRN Temp greater or equal to 38C (100.4F), Mild Pain (1 - 3)  melatonin 5 milliGRAM(s) Oral at bedtime PRN Insomnia  OLANZapine 2.5 milliGRAM(s) Oral daily PRN Agitation      __________________________________________________  LABS:                          8.7    12.17 )-----------( 357      ( 19 Nov 2024 08:15 )             26.3     11-19    139  |  106  |  27[H]  ----------------------------<  248[H]  3.9   |  25  |  0.65    Ca    9.2      19 Nov 2024 08:15  Phos  3.0     11-19  Mg     1.7     11-19    TPro  6.5  /  Alb  3.1[L]  /  TBili  0.2  /  DBili  x   /  AST  12  /  ALT  19  /  AlkPhos  55  11-18    PT/INR - ( 17 Nov 2024 20:55 )   PT: 13.1 sec;   INR: 1.13 ratio         PTT - ( 17 Nov 2024 20:55 )  PTT:25.3 sec  Urinalysis Basic - ( 19 Nov 2024 08:15 )    Color: x / Appearance: x / SG: x / pH: x  Gluc: 248 mg/dL / Ketone: x  / Bili: x / Urobili: x   Blood: x / Protein: x / Nitrite: x   Leuk Esterase: x / RBC: x / WBC x   Sq Epi: x / Non Sq Epi: x / Bacteria: x      CAPILLARY BLOOD GLUCOSE      POCT Blood Glucose.: 277 mg/dL (19 Nov 2024 11:36)  POCT Blood Glucose.: 255 mg/dL (19 Nov 2024 08:39)  POCT Blood Glucose.: 100 mg/dL (18 Nov 2024 21:02)  POCT Blood Glucose.: 69 mg/dL (18 Nov 2024 17:12)  POCT Blood Glucose.: 69 mg/dL (18 Nov 2024 17:10)      __________________________________________________  RADIOLOGY & ADDITIONAL TESTS:    Imaging Personally Reviewed:  YES    < from: Xray Chest 1 View- PORTABLE-Urgent (Xray Chest 1 View- PORTABLE-Urgent .) (11.18.24 @ 04:38) >  IMPRESSION:    No acute lung disease.    < end of copied text >    < from: CT Abdomen and Pelvis w/ IV Cont (11.17.24 @ 23:03) >  IMPRESSION:  1.  Duodenitis. Consider outpatient follow-up with endoscopy to evaluate   forunderlying ulcer/lesion.  2.  Suggestion of pelvic floor weakness with caudal displacement of the   uterus and rectum.      < end of copied text >      Consultant(s) Notes Reviewed:   YES     Plan of care was discussed with patient and /or primary care giver; all questions and concerns were addressed and care was aligned with patient's wishes.    Plan discussed with attending and consulting physicians.

## 2024-11-19 NOTE — CHART NOTE - NSCHARTNOTEFT_GEN_A_CORE
EVENT:    Called to bedside by RN for melena    HPI:  An 84 year old female, living w/ son at home, ambulating w/ a cane, w/ Dementia, AAOx 2 to 3 at baseline, w/ PMHx of DM, HTN, and HLD was brought into the ED by EMS due to AMS. Patient AAOx2 to person and place, but does not know why she is in the hospital. Denies chest pain, dyspnea, palpitations, nausea, vomiting, chills, numbness, headache, visual spots, hearing/taste/smell changes, warm sensation, urinary or stool incontinence. Patient mentioned feeling tired. As per daughter, patient was having decreased appetite, nausea, one episode of non bloody emesis, 2 episodes of non bloody, watery stools, abdominal pain, weakness, and confused for the last 2 days. Daughter gave her Prilosec yesterday which resolved her abdominal pain. No recent sick contacts or travel. She does not have any other complaints. (18 Nov 2024 02:16)        OBJECTIVE:  Vital Signs Last 24 Hrs  T(C): 36.6 (19 Nov 2024 16:33), Max: 36.7 (19 Nov 2024 05:23)  T(F): 97.8 (19 Nov 2024 16:33), Max: 98 (19 Nov 2024 05:23)  HR: 113 (19 Nov 2024 16:33) (74 - 113)  BP: 93/59 (19 Nov 2024 16:33) (93/59 - 147/83)  BP(mean): --  RR: 18 (19 Nov 2024 16:33) (17 - 19)  SpO2: 95% (19 Nov 2024 16:33) (95% - 98%)    Parameters below as of 19 Nov 2024 13:14  Patient On (Oxygen Delivery Method): room air        LABS:                        8.7    12.17 )-----------( 357      ( 19 Nov 2024 08:15 )             26.3     11-19    139  |  106  |  27[H]  ----------------------------<  248[H]  3.9   |  25  |  0.65    Ca    9.2      19 Nov 2024 08:15  Phos  3.0     11-19  Mg     1.7     11-19    TPro  6.5  /  Alb  3.1[L]  /  TBili  0.2  /  DBili  x   /  AST  12  /  ALT  19  /  AlkPhos  55  11-18      ASSESSMENT:  Melena, tachycardic, hypotensive    PLAN:   CBC, fluids, PPI BID, GI consult    FOLLOW UP/RESULTS:    Vitals  CBC

## 2024-11-19 NOTE — PROGRESS NOTE ADULT - SUBJECTIVE AND OBJECTIVE BOX
INTERVAL HPI/OVERNIGHT EVENTS:  Patient seen,doing better,more alert and awake  VITAL SIGNS:  T(F): 98 (11-19-24 @ 05:23)  HR: 88 (11-19-24 @ 05:23)  BP: 147/83 (11-19-24 @ 05:23)  RR: 18 (11-19-24 @ 05:23)  SpO2: 98% (11-19-24 @ 05:23)  Wt(kg): --    PHYSICAL EXAM:  awake  Constitutional:  Eyes:  ENMT:perrla  Neck:  Respiratory:few rales  Cardiovascular:s1s2,m-none  Gastrointestinal:soft,bs pos  Extremities:  Vascular:  Neurological:no focal deficit  Musculoskeletal:    MEDICATIONS  (STANDING):  atorvastatin 10 milliGRAM(s) Oral at bedtime  cefTRIAXone   IVPB 1000 milliGRAM(s) IV Intermittent every 24 hours  dextrose 5% + sodium chloride 0.9%. 1000 milliLiter(s) (65 mL/Hr) IV Continuous <Continuous>  enoxaparin Injectable 40 milliGRAM(s) SubCutaneous every 24 hours  glucagon  Injectable 1 milliGRAM(s) IntraMuscular once  metoprolol tartrate 50 milliGRAM(s) Oral two times a day    MEDICATIONS  (PRN):  acetaminophen     Tablet .. 650 milliGRAM(s) Oral every 6 hours PRN Temp greater or equal to 38C (100.4F), Mild Pain (1 - 3)  melatonin 5 milliGRAM(s) Oral at bedtime PRN Insomnia  OLANZapine 2.5 milliGRAM(s) Oral daily PRN Agitation      Allergies    No Known Allergies    Intolerances        LABS:                        8.7    12.17 )-----------( 357      ( 19 Nov 2024 08:15 )             26.3     11-19    139  |  106  |  27[H]  ----------------------------<  248[H]  3.9   |  25  |  0.65    Ca    9.2      19 Nov 2024 08:15  Phos  3.0     11-19  Mg     1.7     11-19    TPro  6.5  /  Alb  3.1[L]  /  TBili  0.2  /  DBili  x   /  AST  12  /  ALT  19  /  AlkPhos  55  11-18    PT/INR - ( 17 Nov 2024 20:55 )   PT: 13.1 sec;   INR: 1.13 ratio         PTT - ( 17 Nov 2024 20:55 )  PTT:25.3 sec  Urinalysis Basic - ( 19 Nov 2024 08:15 )    Color: x / Appearance: x / SG: x / pH: x  Gluc: 248 mg/dL / Ketone: x  / Bili: x / Urobili: x   Blood: x / Protein: x / Nitrite: x   Leuk Esterase: x / RBC: x / WBC x   Sq Epi: x / Non Sq Epi: x / Bacteria: x        RADIOLOGY & ADDITIONAL TESTS:       Assessment:  · Assessment	  An 84 year old female, living w/ son at home, ambulating w/ a cane, w/ Dementia, AAOx 2 to 3 at baseline, w/ PMHx of DM, HTN, and HLD was brought into the ED by EMS due to AMS. Patient now at baseline. Admitted to medicine for sepsis.     Daughter with list of some of the medications at bedside. Missing frequency and Insulin.   MORNING TEAM TO CONFIRM REST OF [facility]REC        Problem/Plan - 1:  ·  Problem: Sepsis/?pna-stable clinically improving  ·  Plan: CT ab/pelvis showed Duodenitis and to consider outpatient follow-up with endoscopy to evaluate for underlying ulcer/lesion. Suggestion of pelvic floor weakness with caudal displacement of the uterus and rectum.  s/p 1L Bolus NS  Will give 1L Bolus LR  F/U RVP  F/U Procal-increased  F/U CXR  Cont Ceftriaxone empirically.     Problem/Plan - 2:  ·  Problem: Acute encephalopathy.   ·  Plan: Patient now at baseline  Ambulate w/ assistance  F/U Bladder scan  Monitor electrolytes   Hydration   Fall precautions  Aspiration precautions.     Problem/Plan - 3:  ·  Problem: HTN (hypertension).   ·  Plan: Will continue Metoprolol with parameters  Resume ACEi later in the hospital course  BP target <130/90 mmHg  DASH/TLC diet  Adjust medications as needed to meet target.     Problem/Plan - 4:  ·  Problem: DM (diabetes mellitus).   ·  Plan: Hold PO antiglycemic  MORNING TEAM TO CONFIRM INSULIN REGIME  Will start ISS  Finger stick before meal and bedtime   F/U HA1c  Diabetic diet.     Problem/Plan - 5:  ·  Problem: HLD (hyperlipidemia).   ·  Plan: Will continue statin.   DASH/TLC diet.     Problem/Plan - 6:  ·  Problem: Prophylactic measure.   ·  Plan: Lovenox 40 mg SQ q24hr.

## 2024-11-19 NOTE — PROGRESS NOTE ADULT - PROBLEM SELECTOR PLAN 4
Labile ISO poor PO intake  A1c 7.4  Continue finger stick  Hold IVF  Consider SSI if glucose remains high s/p D5

## 2024-11-19 NOTE — PROGRESS NOTE ADULT - ASSESSMENT
An 84 year old female, living w/ son at home, ambulating w/ a cane, w/ Dementia, AAOx 2 to 3 at baseline, w/ PMHx of DM, HTN, and HLD was brought into the ED by EMS due to AMS. Patient now at baseline. Admitted to medicine for sepsis.

## 2024-11-20 LAB
ALBUMIN SERPL ELPH-MCNC: 2.6 G/DL — LOW (ref 3.5–5)
ALP SERPL-CCNC: 59 U/L — SIGNIFICANT CHANGE UP (ref 40–120)
ALT FLD-CCNC: 45 U/L DA — SIGNIFICANT CHANGE UP (ref 10–60)
ANION GAP SERPL CALC-SCNC: 4 MMOL/L — LOW (ref 5–17)
ANION GAP SERPL CALC-SCNC: 6 MMOL/L — SIGNIFICANT CHANGE UP (ref 5–17)
AST SERPL-CCNC: 45 U/L — HIGH (ref 10–40)
BILIRUB SERPL-MCNC: 0.4 MG/DL — SIGNIFICANT CHANGE UP (ref 0.2–1.2)
BUN SERPL-MCNC: 36 MG/DL — HIGH (ref 7–18)
BUN SERPL-MCNC: 39 MG/DL — HIGH (ref 7–18)
CALCIUM SERPL-MCNC: 8.7 MG/DL — SIGNIFICANT CHANGE UP (ref 8.4–10.5)
CALCIUM SERPL-MCNC: 8.7 MG/DL — SIGNIFICANT CHANGE UP (ref 8.4–10.5)
CHLORIDE SERPL-SCNC: 112 MMOL/L — HIGH (ref 96–108)
CHLORIDE SERPL-SCNC: 114 MMOL/L — HIGH (ref 96–108)
CO2 SERPL-SCNC: 25 MMOL/L — SIGNIFICANT CHANGE UP (ref 22–31)
CO2 SERPL-SCNC: 25 MMOL/L — SIGNIFICANT CHANGE UP (ref 22–31)
CREAT SERPL-MCNC: 0.72 MG/DL — SIGNIFICANT CHANGE UP (ref 0.5–1.3)
CREAT SERPL-MCNC: 0.77 MG/DL — SIGNIFICANT CHANGE UP (ref 0.5–1.3)
EGFR: 75 ML/MIN/1.73M2 — SIGNIFICANT CHANGE UP
EGFR: 81 ML/MIN/1.73M2 — SIGNIFICANT CHANGE UP
GLUCOSE BLDC GLUCOMTR-MCNC: 163 MG/DL — HIGH (ref 70–99)
GLUCOSE BLDC GLUCOMTR-MCNC: 202 MG/DL — HIGH (ref 70–99)
GLUCOSE BLDC GLUCOMTR-MCNC: 295 MG/DL — HIGH (ref 70–99)
GLUCOSE SERPL-MCNC: 220 MG/DL — HIGH (ref 70–99)
GLUCOSE SERPL-MCNC: 278 MG/DL — HIGH (ref 70–99)
HCT VFR BLD CALC: 24.1 % — LOW (ref 34.5–45)
HCT VFR BLD CALC: 25 % — LOW (ref 34.5–45)
HCT VFR BLD CALC: 29.3 % — LOW (ref 34.5–45)
HCT VFR BLD CALC: 32 % — LOW (ref 34.5–45)
HGB BLD-MCNC: 10.2 G/DL — LOW (ref 11.5–15.5)
HGB BLD-MCNC: 11.4 G/DL — LOW (ref 11.5–15.5)
HGB BLD-MCNC: 8.3 G/DL — LOW (ref 11.5–15.5)
HGB BLD-MCNC: 8.9 G/DL — LOW (ref 11.5–15.5)
MAGNESIUM SERPL-MCNC: 1.9 MG/DL — SIGNIFICANT CHANGE UP (ref 1.6–2.6)
MAGNESIUM SERPL-MCNC: 1.9 MG/DL — SIGNIFICANT CHANGE UP (ref 1.6–2.6)
MCHC RBC-ENTMCNC: 30.8 PG — SIGNIFICANT CHANGE UP (ref 27–34)
MCHC RBC-ENTMCNC: 31 PG — SIGNIFICANT CHANGE UP (ref 27–34)
MCHC RBC-ENTMCNC: 31.1 PG — SIGNIFICANT CHANGE UP (ref 27–34)
MCHC RBC-ENTMCNC: 31.4 PG — SIGNIFICANT CHANGE UP (ref 27–34)
MCHC RBC-ENTMCNC: 34.4 G/DL — SIGNIFICANT CHANGE UP (ref 32–36)
MCHC RBC-ENTMCNC: 34.8 G/DL — SIGNIFICANT CHANGE UP (ref 32–36)
MCHC RBC-ENTMCNC: 35.6 G/DL — SIGNIFICANT CHANGE UP (ref 32–36)
MCHC RBC-ENTMCNC: 35.6 G/DL — SIGNIFICANT CHANGE UP (ref 32–36)
MCV RBC AUTO: 86.5 FL — SIGNIFICANT CHANGE UP (ref 80–100)
MCV RBC AUTO: 88.3 FL — SIGNIFICANT CHANGE UP (ref 80–100)
MCV RBC AUTO: 89.1 FL — SIGNIFICANT CHANGE UP (ref 80–100)
MCV RBC AUTO: 90.3 FL — SIGNIFICANT CHANGE UP (ref 80–100)
NRBC # BLD: 0 /100 WBCS — SIGNIFICANT CHANGE UP (ref 0–0)
PHOSPHATE SERPL-MCNC: 2.8 MG/DL — SIGNIFICANT CHANGE UP (ref 2.5–4.5)
PHOSPHATE SERPL-MCNC: 3 MG/DL — SIGNIFICANT CHANGE UP (ref 2.5–4.5)
PLATELET # BLD AUTO: 232 K/UL — SIGNIFICANT CHANGE UP (ref 150–400)
PLATELET # BLD AUTO: 259 K/UL — SIGNIFICANT CHANGE UP (ref 150–400)
PLATELET # BLD AUTO: 268 K/UL — SIGNIFICANT CHANGE UP (ref 150–400)
PLATELET # BLD AUTO: 288 K/UL — SIGNIFICANT CHANGE UP (ref 150–400)
POTASSIUM SERPL-MCNC: 3.5 MMOL/L — SIGNIFICANT CHANGE UP (ref 3.5–5.3)
POTASSIUM SERPL-MCNC: 3.8 MMOL/L — SIGNIFICANT CHANGE UP (ref 3.5–5.3)
POTASSIUM SERPL-SCNC: 3.5 MMOL/L — SIGNIFICANT CHANGE UP (ref 3.5–5.3)
POTASSIUM SERPL-SCNC: 3.8 MMOL/L — SIGNIFICANT CHANGE UP (ref 3.5–5.3)
PROT SERPL-MCNC: 6 G/DL — SIGNIFICANT CHANGE UP (ref 6–8.3)
RBC # BLD: 2.67 M/UL — LOW (ref 3.8–5.2)
RBC # BLD: 2.83 M/UL — LOW (ref 3.8–5.2)
RBC # BLD: 3.29 M/UL — LOW (ref 3.8–5.2)
RBC # BLD: 3.7 M/UL — LOW (ref 3.8–5.2)
RBC # FLD: 13.4 % — SIGNIFICANT CHANGE UP (ref 10.3–14.5)
RBC # FLD: 13.9 % — SIGNIFICANT CHANGE UP (ref 10.3–14.5)
RBC # FLD: 14.1 % — SIGNIFICANT CHANGE UP (ref 10.3–14.5)
RBC # FLD: 14.6 % — HIGH (ref 10.3–14.5)
SODIUM SERPL-SCNC: 143 MMOL/L — SIGNIFICANT CHANGE UP (ref 135–145)
SODIUM SERPL-SCNC: 143 MMOL/L — SIGNIFICANT CHANGE UP (ref 135–145)
WBC # BLD: 8.18 K/UL — SIGNIFICANT CHANGE UP (ref 3.8–10.5)
WBC # BLD: 9.25 K/UL — SIGNIFICANT CHANGE UP (ref 3.8–10.5)
WBC # BLD: 9.5 K/UL — SIGNIFICANT CHANGE UP (ref 3.8–10.5)
WBC # BLD: 9.83 K/UL — SIGNIFICANT CHANGE UP (ref 3.8–10.5)
WBC # FLD AUTO: 8.18 K/UL — SIGNIFICANT CHANGE UP (ref 3.8–10.5)
WBC # FLD AUTO: 9.25 K/UL — SIGNIFICANT CHANGE UP (ref 3.8–10.5)
WBC # FLD AUTO: 9.5 K/UL — SIGNIFICANT CHANGE UP (ref 3.8–10.5)
WBC # FLD AUTO: 9.83 K/UL — SIGNIFICANT CHANGE UP (ref 3.8–10.5)

## 2024-11-20 PROCEDURE — 43235 EGD DIAGNOSTIC BRUSH WASH: CPT

## 2024-11-20 RX ORDER — PANTOPRAZOLE SODIUM 40 MG/1
8 TABLET, DELAYED RELEASE ORAL
Qty: 80 | Refills: 0 | Status: DISCONTINUED | OUTPATIENT
Start: 2024-11-20 | End: 2024-11-21

## 2024-11-20 RX ORDER — METOPROLOL TARTRATE 100 MG/1
2.5 TABLET, FILM COATED ORAL EVERY 12 HOURS
Refills: 0 | Status: DISCONTINUED | OUTPATIENT
Start: 2024-11-20 | End: 2024-11-20

## 2024-11-20 RX ORDER — CHLORHEXIDINE GLUCONATE 1.2 MG/ML
1 RINSE ORAL
Refills: 0 | Status: DISCONTINUED | OUTPATIENT
Start: 2024-11-20 | End: 2024-11-22

## 2024-11-20 RX ADMIN — Medication 2: at 11:58

## 2024-11-20 RX ADMIN — SODIUM CHLORIDE 75 MILLILITER(S): 9 INJECTION, SOLUTION INTRAMUSCULAR; INTRAVENOUS; SUBCUTANEOUS at 12:52

## 2024-11-20 RX ADMIN — PANTOPRAZOLE SODIUM 10 MG/HR: 40 TABLET, DELAYED RELEASE ORAL at 14:29

## 2024-11-20 RX ADMIN — CHLORHEXIDINE GLUCONATE 1 APPLICATION(S): 1.2 RINSE ORAL at 10:59

## 2024-11-20 RX ADMIN — Medication 1: at 17:16

## 2024-11-20 RX ADMIN — PANTOPRAZOLE SODIUM 40 MILLIGRAM(S): 40 TABLET, DELAYED RELEASE ORAL at 06:46

## 2024-11-20 RX ADMIN — PANTOPRAZOLE SODIUM 10 MG/HR: 40 TABLET, DELAYED RELEASE ORAL at 22:09

## 2024-11-20 RX ADMIN — Medication 3: at 08:06

## 2024-11-20 RX ADMIN — Medication 100 MILLIGRAM(S): at 06:46

## 2024-11-20 NOTE — CONSULT NOTE ADULT - ATTENDING COMMENTS
85 y/o female with HTN, HLD presenting with GI bleeding. Was undergoing endoscopy today when found to have a large duodenal ulcer with out evidence of acute bleeding. Given high risk of bleeding ICU consulted for evaluation. Patient is hemodynamically stable, received 1 unit PRBC during endoscopy and at time of evaluation receiving second unit of blood.     Assessment:  1. Acute blood loss anemia  2. Duodenal ulcer  3. Gastrointestinal bleeding   4. DM type 2     Plan:  - Close hemodynamic monitoring  - IV protonix per GI   - GI follow up, recommend IR vs. surgery evaluation if rebleeds  - Monitor for signs of bleeding  - Trend hgb q 8 hours   - NPO for now  - Glucose monitoring while NPO  - Non chemical DVT prophylaxis  - Transfer to ICU for further management 83 y/o female with HTN, HLD presenting with GI bleeding. Was undergoing endoscopy today when found to have a large duodenal ulcer with out evidence of acute bleeding. Given high risk of bleeding ICU consulted for evaluation. Patient is hemodynamically stable, received 1 unit PRBC during endoscopy and at time of evaluation receiving second unit of blood.     Assessment:  1. Acute blood loss anemia  2. Duodenal ulcer  3. Gastrointestinal bleeding   4. DM type 2     Plan:  - Close hemodynamic monitoring  - IV protonix per GI   - GI follow up, recommend IR vs. surgery evaluation if rebleeds  - Monitor for signs of bleeding  - Trend hgb q 8 hours   - NPO for now  - Glucose monitoring while NPO  - No signs of infection, cultures negative  - D/c antibiotics for now  - Non chemical DVT prophylaxis  - Transfer to ICU for further management

## 2024-11-20 NOTE — PHARMACOTHERAPY INTERVENTION NOTE - COMMENTS
APAP order should not be combined for pain and fever, use of alternative agents is recommended
No benefit for PPI drip relative to IV PPI every 12 hours, suggest to switch to intermittent infusions.

## 2024-11-20 NOTE — PROGRESS NOTE ADULT - ASSESSMENT
Leukocytosis - no obvious source of infection identified at this time and has normalized        Plan - Agree with Dcing Rocephin  Time spent - 30 mins  reconsult prn.

## 2024-11-20 NOTE — PROGRESS NOTE ADULT - GASTROINTESTINAL
Additional Notes: Patients nurse received Aquaphor sample and biopsy site care sheet. Detail Level: Simple soft/nontender/nondistended/normal active bowel sounds/no guarding/no rigidity details… Render Risk Assessment In Note?: no Additional Notes: Patients nurse received cryotherapy care sheet

## 2024-11-20 NOTE — PROGRESS NOTE ADULT - SUBJECTIVE AND OBJECTIVE BOX
ICU VISIT  84y Female    Meds:    Allergies    No Known Allergies    Intolerances        VITALS:  Vital Signs Last 24 Hrs  T(C): 37.1 (20 Nov 2024 15:00), Max: 37.1 (20 Nov 2024 15:00)  T(F): 98.7 (20 Nov 2024 15:00), Max: 98.7 (20 Nov 2024 15:00)  HR: 82 (20 Nov 2024 15:00) (82 - 131)  BP: 113/86 (20 Nov 2024 15:00) (91/67 - 143/95)  BP(mean): 97 (20 Nov 2024 15:00) (64 - 112)  RR: 21 (20 Nov 2024 15:00) (15 - 21)  SpO2: 95% (20 Nov 2024 15:00) (94% - 100%)    Parameters below as of 20 Nov 2024 14:00  Patient On (Oxygen Delivery Method): room air        LABS/DIAGNOSTIC TESTS:                          11.4   9.50  )-----------( 232      ( 20 Nov 2024 14:11 )             32.0         11-20    143  |  114[H]  |  36[H]  ----------------------------<  220[H]  3.8   |  25  |  0.72    Ca    8.7      20 Nov 2024 10:36  Phos  2.8     11-20  Mg     1.9     11-20    TPro  6.0  /  Alb  2.6[L]  /  TBili  0.4  /  DBili  x   /  AST  45[H]  /  ALT  45  /  AlkPhos  59  11-20      LIVER FUNCTIONS - ( 20 Nov 2024 10:36 )  Alb: 2.6 g/dL / Pro: 6.0 g/dL / ALK PHOS: 59 U/L / ALT: 45 U/L DA / AST: 45 U/L / GGT: x             CULTURES: .Blood BLOOD  11-18 @ 12:18   No growth at 24 hours  --  --      .Blood BLOOD  11-18 @ 12:10   No growth at 24 hours  --  --            RADIOLOGY:< from: Xray Chest 1 View- PORTABLE-Urgent (Xray Chest 1 View- PORTABLE-Urgent .) (11.18.24 @ 04:38) >    ACC: [id number] EXAM:  XR CHEST PORTABLE URGENT 1V   ORDERED BY: RICARDO OROURKE     PROCEDURE DATE:  11/18/2024          INTERPRETATION:  EXAM:XR CHEST URGENT.     CLINICAL INDICATION: Eval for infiltrates .     TECHNIQUE: Portable AP view.     PRIOR EXAM: None.     FINDINGS:     Visualized lung fields are clear. Magnified cardiac and mediastinal   silhouette is unremarkable. 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      IMPRESSION:    No acute lung disease.    --- End of Report ---            BERKLEY HAYES MD; Attending Radiologist  This document has been electronically signed. Nov 18 2024  2:32PM    < end of copied text >        ROS:  [  ] UNABLE TO ELICIT ICU VISIT  84y Female who was transferred to the ICU as she has been having GI bleeding and had a EGD apparently had mostly old blood with no active bleeding source found. She is resting comfortably and has no coughing , SOB, chest pain, no nausea or vomiting , no abdominal pain but has been having bleeding per rectum, she has no fevers or chills and her WBC count is WNL. Her daughter is at the bedside and is translating for me. The patient is very mildly confused only.    Meds:    Allergies    No Known Allergies    Intolerances        VITALS:  Vital Signs Last 24 Hrs  T(C): 37.1 (20 Nov 2024 15:00), Max: 37.1 (20 Nov 2024 15:00)  T(F): 98.7 (20 Nov 2024 15:00), Max: 98.7 (20 Nov 2024 15:00)  HR: 82 (20 Nov 2024 15:00) (82 - 131)  BP: 113/86 (20 Nov 2024 15:00) (91/67 - 143/95)  BP(mean): 97 (20 Nov 2024 15:00) (64 - 112)  RR: 21 (20 Nov 2024 15:00) (15 - 21)  SpO2: 95% (20 Nov 2024 15:00) (94% - 100%)    Parameters below as of 20 Nov 2024 14:00  Patient On (Oxygen Delivery Method): room air        LABS/DIAGNOSTIC TESTS:                          11.4   9.50  )-----------( 232      ( 20 Nov 2024 14:11 )             32.0         11-20    143  |  114[H]  |  36[H]  ----------------------------<  220[H]  3.8   |  25  |  0.72    Ca    8.7      20 Nov 2024 10:36  Phos  2.8     11-20  Mg     1.9     11-20    TPro  6.0  /  Alb  2.6[L]  /  TBili  0.4  /  DBili  x   /  AST  45[H]  /  ALT  45  /  AlkPhos  59  11-20      LIVER FUNCTIONS - ( 20 Nov 2024 10:36 )  Alb: 2.6 g/dL / Pro: 6.0 g/dL / ALK PHOS: 59 U/L / ALT: 45 U/L DA / AST: 45 U/L / GGT: x             CULTURES: .Blood BLOOD  11-18 @ 12:18   No growth at 24 hours  --  --      .Blood BLOOD  11-18 @ 12:10   No growth at 24 hours  --  --            RADIOLOGY:< from: Xray Chest 1 View- PORTABLE-Urgent (Xray Chest 1 View- PORTABLE-Urgent .) (11.18.24 @ 04:38) >    ACC: [id number] EXAM:  XR CHEST PORTABLE URGENT 1V   ORDERED BY: RICARDO OROURKE     PROCEDURE DATE:  11/18/2024          INTERPRETATION:  EXAM:XR CHEST URGENT.     CLINICAL INDICATION: Eval for infiltrates .     TECHNIQUE: Portable AP view.     PRIOR EXAM: None.     FINDINGS:     Visualized lung fields are clear. Magnified cardiac and mediastinal   silhouette is unremarkable. 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      IMPRESSION:    No acute lung disease.    --- End of Report ---            BERKLEY HAYES MD; Attending Radiologist  This document has been electronically signed. Nov 18 2024  2:32PM    < end of copied text >        ROS:  [  ] UNABLE TO ELICIT

## 2024-11-20 NOTE — CONSULT NOTE ADULT - ASSESSMENT
ASSESSMENT            _________CNS___________  #Pain  #Sedation      _________CVS___________      _________RESP__________      ___________GI____________  # Gastrointestinal bleeding  # 2 Large gastric ulcers  # Melena   # Duodenitis  - Patients Hb 8.3  - On presentation 10.1  - As low as 6.8 on 11/19  - Likely 2/2 GIB given episodes of melena   - S/P 1U PRBC on 11/19  - S/P EGD showing 2 Large non bleeding ulcers in the stomach and no signs of activie bleeding in the duodeum  - CT Showing duodenitis  - S/P 2U PRBC on 11/20 s/p EGD  - Total PRBC transfused this admission: 3U as of 11/20  - F/U Post transfusion CBC  - Trend CBC q6h    ________ RENAL__________  # No acute Pathology    __________MSK___________  # No acute Pathology    ___________ID____________  # SIRS vs Sepsis   - Patient meeting SIRS sepsis criteria without clear source of infection  - Elevated procalcitonin  - Negative Lactate  - Hemodynamically stable and afebrile  - UA negative  - FLU/COVID negative  - Blood cultures NGTD  - CXR negative for acute intrathorasic pathology  - CT A/P showing Duodenitis  - Continue Ceftriaxone  - ID Consulted Dr. Swain    _________ENDO__________  # DM  - Patient with history of DM  - Start patient on Insulin sliding scale q6h while NPO  - Start patient on Finger sticks q6h while NPO  - Once ready to resume PO diet, start Diabetic Diet, NPO for now  - A1c of 7.4    ______HEME/ONC_______  # anemia  - Patients Hb 8.3  - On presentation 10.1  - As low as 6.8 on 11/19  - Likely 2/2 GIB given episodes of melena   - S/P 1U PRBC on 11/19  - S/P EGD showing 2 Large non bleeding ulcers in the stomach and no signs of activie bleeding in the duodeum  - CT Showing duodenitis  - S/P 2U PRBC on 11/20 s/p EGD  - Total PRBC transfused this admission: 3U as of 11/20  - F/U Post transfusion CBC  - Trend CBC q6h    _________SKIN____________  # No acute pathology    ________PROPHY_______  #DVT  - SCD for DVT PPX given GIB   #GI   - BID PPI    ______GOC/DISPO___________  - ICU for close monitoring     ASSESSMENT  Patient is An 84 year old female, from home, lives with son, ambulating with a cane, with PMH of HTN, HLD, DM, Dementia (A&Ox2 baseline), who presented to the ED with AMS. Patient now at baseline mentation. Initially admitted to medicine for sepsis. Patient had an episode on Melena on 11/19 and than CBC showing drow of Hb from 10 to 6.  1U PRBC given and Hb improved to 8s.  Patient again had further episodes of melena on 11/20, went for EGD and was found to have 2 large non bleeding ulcers.  Patient was transfused 1U PRBC and ordered for a second unit.  ICU was consulted for close monitoring in GI bleeding.          _________CNS___________  # mentation  # Dementia  - Baseline A&Ox2  - Currently at baseline mentation      _________CVS___________  # HTN  - Patient with history of HTN  - Patient on Enalapril and Metoprolol at home  - HOLD antihypertensives for now given GIB    - Patient with history of HLD  - Patient on Statin at home  - Continue home antihyperlipidemic     _________RESP__________  # No Acute Pathology     ___________GI____________  # Gastrointestinal bleeding  # 2 Large gastric ulcers  # Melena   # Duodenitis  - Patient had 3-4 episodes of melena between 11/19 and 11/20  - Hb dropped from 10 on admission to 6.8  - Likely 2/2 GIB given episodes of melena   - S/P 1U PRBC on 11/19  - S/P EGD showing 2 Large non bleeding ulcers in the stomach and no signs of activie bleeding in the duodeum  - CT Showing duodenitis  - S/P 2U PRBC on 11/20 s/p EGD  - Total PRBC transfused this admission: 3U as of 11/20  - F/U Post transfusion CBC  - Trend CBC q6h  - NPO  - PPI infusion  - Transfuse as needed to maintain Hb > 7  - Maintain 2 large bore IVs  - Per GI EGD note if patient has epsiode of rebleeding consult IR and / or surgery as no endoscopic options  - GI Consulted Dr. Galdamez    ________ RENAL__________  # No acute Pathology    __________MSK___________  # No acute Pathology    ___________ID____________  # SIRS vs Sepsis   - Patient meeting SIRS sepsis criteria without clear source of infection  - Elevated procalcitonin  - Negative Lactate  - Hemodynamically stable and afebrile  - UA negative  - FLU/COVID negative  - Blood cultures NGTD  - CXR negative for acute intrathorasic pathology  - CT A/P showing Duodenitis  - Continue Ceftriaxone  - ID Consulted Dr. Swain    _________ENDO__________  # DM  - Patient with history of DM  - Start patient on Insulin sliding scale q6h while NPO  - Start patient on Finger sticks q6h while NPO  - Once ready to resume PO diet, start Diabetic Diet, NPO for now  - A1c of 7.4    ______HEME/ONC_______  # anemia  - Patients Hb 8.3  - On presentation 10.1  - As low as 6.8 on 11/19  - Likely 2/2 GIB given episodes of melena   - S/P 1U PRBC on 11/19  - S/P EGD showing 2 Large non bleeding ulcers in the stomach and no signs of activie bleeding in the duodeum  - CT Showing duodenitis  - S/P 2U PRBC on 11/20 s/p EGD  - Total PRBC transfused this admission: 3U as of 11/20  - F/U Post transfusion CBC  - Trend CBC q6h    _________SKIN____________  # No acute pathology    ________PROPHY_______  #DVT  - SCD for DVT PPX given GIB   #GI   - BID PPI    ______GOC/DISPO___________  - ICU for close monitoring

## 2024-11-20 NOTE — CONSULT NOTE ADULT - SUBJECTIVE AND OBJECTIVE BOX
Patient is a 84y old  Female who presents with a chief complaint of Sepsis (19 Nov 2024 13:51)      HPI:  An 84 year old female, living w/ son at home, ambulating w/ a cane, w/ Dementia, AAOx 2 to 3 at baseline, w/ PMHx of DM, HTN, and HLD was brought into the ED by EMS due to AMS. Patient AAOx2 to person and place, but does not know why she is in the hospital. Denies chest pain, dyspnea, palpitations, nausea, vomiting, chills, numbness, headache, visual spots, hearing/taste/smell changes, warm sensation, urinary or stool incontinence. Patient mentioned feeling tired. As per daughter, patient was having decreased appetite, nausea, one episode of non bloody emesis, 2 episodes of non bloody, watery stools, abdominal pain, weakness, and confused for the last 2 days. Daughter gave her Prilosec yesterday which resolved her abdominal pain. No recent sick contacts or travel. She does not have any other complaints. (18 Nov 2024 02:16)      Allergies    No Known Allergies    Intolerances        MEDICATIONS  (STANDING):  atorvastatin 10 milliGRAM(s) Oral at bedtime  cefTRIAXone   IVPB 1000 milliGRAM(s) IV Intermittent every 24 hours  glucagon  Injectable 1 milliGRAM(s) IntraMuscular once  insulin lispro (ADMELOG) corrective regimen sliding scale   SubCutaneous three times a day before meals  insulin lispro (ADMELOG) corrective regimen sliding scale   SubCutaneous at bedtime  metoprolol tartrate Injectable 2.5 milliGRAM(s) IV Push every 12 hours  pantoprazole  Injectable 40 milliGRAM(s) IV Push every 12 hours  sodium chloride 0.9%. 1000 milliLiter(s) (75 mL/Hr) IV Continuous <Continuous>    MEDICATIONS  (PRN):  acetaminophen     Tablet .. 650 milliGRAM(s) Oral every 6 hours PRN Temp greater or equal to 38C (100.4F), Mild Pain (1 - 3)  melatonin 5 milliGRAM(s) Oral at bedtime PRN Insomnia  OLANZapine 2.5 milliGRAM(s) Oral daily PRN Agitation      Daily Height in cm: 149.9 (20 Nov 2024 08:22)    Daily     Drug Dosing Weight  Height (cm): 149.9 (20 Nov 2024 08:22)  Weight (kg): 63.5 (20 Nov 2024 08:22)  BMI (kg/m2): 28.3 (20 Nov 2024 08:22)  BSA (m2): 1.58 (20 Nov 2024 08:22)    PAST MEDICAL & SURGICAL HISTORY:  DM (diabetes mellitus)      HTN (hypertension)      HLD (hyperlipidemia)      No significant past surgical history          FAMILY HISTORY:  FHx: heart disease        SOCIAL HISTORY:    ADVANCE DIRECTIVES:    REVIEW OF SYSTEMS:    CONSTITUTIONAL: No fever, weight loss, or fatigue  EYES: No eye pain, visual disturbances, or discharge  ENMT:  No difficulty hearing, tinnitus, vertigo; No sinus or throat pain  NECK: No pain or stiffness  BREASTS: No pain, masses, or nipple discharge  RESPIRATORY: No cough, wheezing, chills or hemoptysis; No shortness of breath  CARDIOVASCULAR: No chest pain, palpitations, dizziness, or leg swelling  GASTROINTESTINAL: No abdominal or epigastric pain. No nausea, vomiting, or hematemesis; No diarrhea or constipation. No melena or hematochezia.  GENITOURINARY: No dysuria, frequency, hematuria, or incontinence  NEUROLOGICAL: No headaches, memory loss, loss of strength, numbness, or tremors  SKIN: No itching, burning, rashes, or lesions   LYMPH NODES: No enlarged glands  ENDOCRINE: No heat or cold intolerance; No hair loss  MUSCULOSKELETAL: No joint pain or swelling; No muscle, back, or extremity pain  PSYCHIATRIC: No depression, anxiety, mood swings, or difficulty sleeping  HEME/LYMPH: No easy bruising, or bleeding gums  ALLERGY AND IMMUNOLOGIC: No hives or eczema          ICU Vital Signs Last 24 Hrs  T(C): 36.5 (20 Nov 2024 08:32), Max: 36.6 (19 Nov 2024 13:14)  T(F): 97.7 (20 Nov 2024 08:32), Max: 97.8 (19 Nov 2024 13:14)  HR: 100 (20 Nov 2024 09:25) (90 - 131)  BP: 91/67 (20 Nov 2024 09:25) (91/67 - 143/95)  BP(mean): --  ABP: --  ABP(mean): --  RR: 17 (20 Nov 2024 09:25) (17 - 20)  SpO2: 97% (20 Nov 2024 09:25) (94% - 97%)    O2 Parameters below as of 20 Nov 2024 09:25  Patient On (Oxygen Delivery Method): room air                I&O's Detail      PHYSICAL EXAM:    GENERAL: NAD, well-groomed, well-developed  HEAD:  Atraumatic, Normocephalic  EYES: EOMI, PERRLA, conjunctiva and sclera clear  ENMT: No tonsillar erythema, exudates, or enlargement; Moist mucous membranes, Good dentition, No lesions  NECK: Supple, No JVD, Normal thyroid  NERVOUS SYSTEM:  Alert & Oriented X3, Good concentration; Motor Strength 5/5 B/L upper and lower extremities; DTRs 2+ intact and symmetric  CHEST/LUNG: Clear to percussion bilaterally; No rales, rhonchi, wheezing, or rubs  HEART: Regular rate and rhythm; No murmurs, rubs, or gallops  ABDOMEN: Soft, Nontender, Nondistended; Bowel sounds present  EXTREMITIES:  2+ Peripheral Pulses, No clubbing, cyanosis, or edema  LYMPH: No lymphadenopathy noted  SKIN: No rashes or lesions    LABS:  CBC Full  -  ( 20 Nov 2024 05:16 )  WBC Count : 8.18 K/uL  RBC Count : 2.67 M/uL  Hemoglobin : 8.3 g/dL  Hematocrit : 24.1 %  Platelet Count - Automated : 288 K/uL  Mean Cell Volume : 90.3 fl  Mean Cell Hemoglobin : 31.1 pg  Mean Cell Hemoglobin Concentration : 34.4 g/dL  Auto Neutrophil # : x  Auto Lymphocyte # : x  Auto Monocyte # : x  Auto Eosinophil # : x  Auto Basophil # : x  Auto Neutrophil % : x  Auto Lymphocyte % : x  Auto Monocyte % : x  Auto Eosinophil % : x  Auto Basophil % : x    11-20    143  |  112[H]  |  39[H]  ----------------------------<  278[H]  3.5   |  25  |  0.77    Ca    8.7      20 Nov 2024 05:16  Phos  3.0     11-20  Mg     1.9     11-20      CAPILLARY BLOOD GLUCOSE      POCT Blood Glucose.: 295 mg/dL (20 Nov 2024 07:46)      Urinalysis Basic - ( 20 Nov 2024 05:16 )    Color: x / Appearance: x / SG: x / pH: x  Gluc: 278 mg/dL / Ketone: x  / Bili: x / Urobili: x   Blood: x / Protein: x / Nitrite: x   Leuk Esterase: x / RBC: x / WBC x   Sq Epi: x / Non Sq Epi: x / Bacteria: x          Culture Results:   No growth at 24 hours (11-18 @ 12:18)  Culture Results:   No growth at 24 hours (11-18 @ 12:10)      EKG:    ECHO, US:    RADIOLOGY:    CRITICAL CARE TIME SPENT:   Patient is a 84y old  Female who presents with a chief complaint of Sepsis (19 Nov 2024 13:51)      HPI:  An 84 year old female, living w/ son at home, ambulating w/ a cane, w/ Dementia, AAOx 2 to 3 at baseline, w/ PMHx of DM, HTN, and HLD was brought into the ED by EMS due to AMS. Patient AAOx2 to person and place, but does not know why she is in the hospital. Denies chest pain, dyspnea, palpitations, nausea, vomiting, chills, numbness, headache, visual spots, hearing/taste/smell changes, warm sensation, urinary or stool incontinence. Patient mentioned feeling tired. As per daughter, patient was having decreased appetite, nausea, one episode of non bloody emesis, 2 episodes of non bloody, watery stools, abdominal pain, weakness, and confused for the last 2 days. Daughter gave her Prilosec yesterday which resolved her abdominal pain. No recent sick contacts or travel. She does not have any other complaints. (18 Nov 2024 02:16)  Patient had an episode on Melena on 11/19 and than CBC showing drow of Hb from 10 to 6.  1U PRBC given and Hb improved to 8s.  Patient again had further episodes of melena on 11/20, went for EGD and was found to have 2 large non bleeding ulcers.  Patient was transfused 1U PRBC and ordered for a second unit.  ICU was consulted for close monitoring in GI bleeding.    Allergies    No Known Allergies    Intolerances        MEDICATIONS  (STANDING):  atorvastatin 10 milliGRAM(s) Oral at bedtime  cefTRIAXone   IVPB 1000 milliGRAM(s) IV Intermittent every 24 hours  glucagon  Injectable 1 milliGRAM(s) IntraMuscular once  insulin lispro (ADMELOG) corrective regimen sliding scale   SubCutaneous three times a day before meals  insulin lispro (ADMELOG) corrective regimen sliding scale   SubCutaneous at bedtime  metoprolol tartrate Injectable 2.5 milliGRAM(s) IV Push every 12 hours  pantoprazole  Injectable 40 milliGRAM(s) IV Push every 12 hours  sodium chloride 0.9%. 1000 milliLiter(s) (75 mL/Hr) IV Continuous <Continuous>    MEDICATIONS  (PRN):  acetaminophen     Tablet .. 650 milliGRAM(s) Oral every 6 hours PRN Temp greater or equal to 38C (100.4F), Mild Pain (1 - 3)  melatonin 5 milliGRAM(s) Oral at bedtime PRN Insomnia  OLANZapine 2.5 milliGRAM(s) Oral daily PRN Agitation      Daily Height in cm: 149.9 (20 Nov 2024 08:22)    Daily     Drug Dosing Weight  Height (cm): 149.9 (20 Nov 2024 08:22)  Weight (kg): 63.5 (20 Nov 2024 08:22)  BMI (kg/m2): 28.3 (20 Nov 2024 08:22)  BSA (m2): 1.58 (20 Nov 2024 08:22)    PAST MEDICAL & SURGICAL HISTORY:  DM (diabetes mellitus)      HTN (hypertension)      HLD (hyperlipidemia)      No significant past surgical history          FAMILY HISTORY:  FHx: heart disease        SOCIAL HISTORY:    ADVANCE DIRECTIVES:    REVIEW OF SYSTEMS:  Unable to assess as patient post anesthesia           ICU Vital Signs Last 24 Hrs  T(C): 36.5 (20 Nov 2024 08:32), Max: 36.6 (19 Nov 2024 13:14)  T(F): 97.7 (20 Nov 2024 08:32), Max: 97.8 (19 Nov 2024 13:14)  HR: 100 (20 Nov 2024 09:25) (90 - 131)  BP: 91/67 (20 Nov 2024 09:25) (91/67 - 143/95)  BP(mean): --  ABP: --  ABP(mean): --  RR: 17 (20 Nov 2024 09:25) (17 - 20)  SpO2: 97% (20 Nov 2024 09:25) (94% - 97%)    O2 Parameters below as of 20 Nov 2024 09:25  Patient On (Oxygen Delivery Method): room air                I&O's Detail      PHYSICAL EXAM:  GENERAL: NAD; lying in bed; Frail and elderly  HEAD:  Atraumatic, Normocephalic  EYES: EOMI, PERRLA, conjunctiva and sclera clear  ENMT: No tonsillar erythema, exudates, or enlargement; Moist mucous membranes  NECK: Supple, normal appearance, No JVD; Normal thyroid; Trachea midline  NERVOUS SYSTEM:  Alert & Oriented X1,  Not fully cooperating with full exam, Motor Strength appears 5/5 B/L upper and lower extremities, sensation intact  CHEST/LUNG: Lungs clear to auscultation bilaterally, No rales, rhonchi, wheezing   HEART: Regular rate and rhythm; No murmurs, rubs, or gallops  ABDOMEN: Soft, Nontender, Nondistended; Bowel sounds present; Melena noted on bed after patient was cleaned  EXTREMITIES:  2+ Peripheral Pulses, No clubbing, cyanosis, or edema  SKIN: No rashes or lesions;     LABS:  CBC Full  -  ( 20 Nov 2024 05:16 )  WBC Count : 8.18 K/uL  RBC Count : 2.67 M/uL  Hemoglobin : 8.3 g/dL  Hematocrit : 24.1 %  Platelet Count - Automated : 288 K/uL  Mean Cell Volume : 90.3 fl  Mean Cell Hemoglobin : 31.1 pg  Mean Cell Hemoglobin Concentration : 34.4 g/dL  Auto Neutrophil # : x  Auto Lymphocyte # : x  Auto Monocyte # : x  Auto Eosinophil # : x  Auto Basophil # : x  Auto Neutrophil % : x  Auto Lymphocyte % : x  Auto Monocyte % : x  Auto Eosinophil % : x  Auto Basophil % : x    11-20    143  |  112[H]  |  39[H]  ----------------------------<  278[H]  3.5   |  25  |  0.77    Ca    8.7      20 Nov 2024 05:16  Phos  3.0     11-20  Mg     1.9     11-20      CAPILLARY BLOOD GLUCOSE      POCT Blood Glucose.: 295 mg/dL (20 Nov 2024 07:46)      Urinalysis Basic - ( 20 Nov 2024 05:16 )    Color: x / Appearance: x / SG: x / pH: x  Gluc: 278 mg/dL / Ketone: x  / Bili: x / Urobili: x   Blood: x / Protein: x / Nitrite: x   Leuk Esterase: x / RBC: x / WBC x   Sq Epi: x / Non Sq Epi: x / Bacteria: x          Culture Results:   No growth at 24 hours (11-18 @ 12:18)  Culture Results:   No growth at 24 hours (11-18 @ 12:10)      EKG:    ECHO, US:    RADIOLOGY:    CRITICAL CARE TIME SPENT:

## 2024-11-20 NOTE — PROGRESS NOTE ADULT - SUBJECTIVE AND OBJECTIVE BOX
· Reason for Referral/Consultation:	GI Bleed  · Reason for Admission	Sepsis      · Subjective and Objective:   Patient is a 84y old  Female who presents with a chief complaint of Sepsis (19 Nov 2024 13:51)      HPI:  An 84 year old female, living w/ son at home, ambulating w/ a cane, w/ Dementia, AAOx 2 to 3 at baseline, w/ PMHx of DM, HTN, and HLD was brought into the ED by EMS due to AMS. Patient AAOx2 to person and place, but does not know why she is in the hospital. Denies chest pain, dyspnea, palpitations, nausea, vomiting, chills, numbness, headache, visual spots, hearing/taste/smell changes, warm sensation, urinary or stool incontinence. Patient mentioned feeling tired. As per daughter, patient was having decreased appetite, nausea, one episode of non bloody emesis, 2 episodes of non bloody, watery stools, abdominal pain, weakness, and confused for the last 2 days. Daughter gave her Prilosec yesterday which resolved her abdominal pain. No recent sick contacts or travel. She does not have any other complaints. (18 Nov 2024 02:16)      Allergies    No Known Allergies    Intolerances        MEDICATIONS  (STANDING):  atorvastatin 10 milliGRAM(s) Oral at bedtime  cefTRIAXone   IVPB 1000 milliGRAM(s) IV Intermittent every 24 hours  glucagon  Injectable 1 milliGRAM(s) IntraMuscular once  insulin lispro (ADMELOG) corrective regimen sliding scale   SubCutaneous three times a day before meals  insulin lispro (ADMELOG) corrective regimen sliding scale   SubCutaneous at bedtime  metoprolol tartrate Injectable 2.5 milliGRAM(s) IV Push every 12 hours  pantoprazole  Injectable 40 milliGRAM(s) IV Push every 12 hours  sodium chloride 0.9%. 1000 milliLiter(s) (75 mL/Hr) IV Continuous <Continuous>    MEDICATIONS  (PRN):  acetaminophen     Tablet .. 650 milliGRAM(s) Oral every 6 hours PRN Temp greater or equal to 38C (100.4F), Mild Pain (1 - 3)  melatonin 5 milliGRAM(s) Oral at bedtime PRN Insomnia  OLANZapine 2.5 milliGRAM(s) Oral daily PRN Agitation      Daily Height in cm: 149.9 (20 Nov 2024 08:22)    Daily     Drug Dosing Weight  Height (cm): 149.9 (20 Nov 2024 08:22)  Weight (kg): 63.5 (20 Nov 2024 08:22)  BMI (kg/m2): 28.3 (20 Nov 2024 08:22)  BSA (m2): 1.58 (20 Nov 2024 08:22)    PAST MEDICAL & SURGICAL HISTORY:  DM (diabetes mellitus)      HTN (hypertension)      HLD (hyperlipidemia)      No significant past surgical history          FAMILY HISTORY:  FHx: heart disease        SOCIAL HISTORY:    ADVANCE DIRECTIVES:    REVIEW OF SYSTEMS:    CONSTITUTIONAL: No fever, weight loss, or fatigue  EYES: No eye pain, visual disturbances, or discharge  ENMT:  No difficulty hearing, tinnitus, vertigo; No sinus or throat pain  NECK: No pain or stiffness  BREASTS: No pain, masses, or nipple discharge  RESPIRATORY: No cough, wheezing, chills or hemoptysis; No shortness of breath  CARDIOVASCULAR: No chest pain, palpitations, dizziness, or leg swelling  GASTROINTESTINAL: No abdominal or epigastric pain. No nausea, vomiting, or hematemesis; No diarrhea or constipation. No melena or hematochezia.  GENITOURINARY: No dysuria, frequency, hematuria, or incontinence  NEUROLOGICAL: No headaches, memory loss, loss of strength, numbness, or tremors  SKIN: No itching, burning, rashes, or lesions   LYMPH NODES: No enlarged glands  ENDOCRINE: No heat or cold intolerance; No hair loss  MUSCULOSKELETAL: No joint pain or swelling; No muscle, back, or extremity pain  PSYCHIATRIC: No depression, anxiety, mood swings, or difficulty sleeping  HEME/LYMPH: No easy bruising, or bleeding gums  ALLERGY AND IMMUNOLOGIC: No hives or eczema          ICU Vital Signs Last 24 Hrs  T(C): 36.5 (20 Nov 2024 08:32), Max: 36.6 (19 Nov 2024 13:14)  T(F): 97.7 (20 Nov 2024 08:32), Max: 97.8 (19 Nov 2024 13:14)  HR: 100 (20 Nov 2024 09:25) (90 - 131)  BP: 91/67 (20 Nov 2024 09:25) (91/67 - 143/95)  BP(mean): --  ABP: --  ABP(mean): --  RR: 17 (20 Nov 2024 09:25) (17 - 20)  SpO2: 97% (20 Nov 2024 09:25) (94% - 97%)    O2 Parameters below as of 20 Nov 2024 09:25  Patient On (Oxygen Delivery Method): room air                I&O's Detail      PHYSICAL EXAM:    GENERAL: NAD, well-groomed, well-developed  HEAD:  Atraumatic, Normocephalic  EYES: EOMI, PERRLA, conjunctiva and sclera clear  ENMT: No tonsillar erythema, exudates, or enlargement; Moist mucous membranes, Good dentition, No lesions  NECK: Supple, No JVD, Normal thyroid  NERVOUS SYSTEM:  Alert & Oriented X3, Good concentration; Motor Strength 5/5 B/L upper and lower extremities; DTRs 2+ intact and symmetric  CHEST/LUNG: Clear to percussion bilaterally; No rales, rhonchi, wheezing, or rubs  HEART: Regular rate and rhythm; No murmurs, rubs, or gallops  ABDOMEN: Soft, Nontender, Nondistended; Bowel sounds present  EXTREMITIES:  2+ Peripheral Pulses, No clubbing, cyanosis, or edema  LYMPH: No lymphadenopathy noted  SKIN: No rashes or lesions    LABS:  CBC Full  -  ( 20 Nov 2024 05:16 )  WBC Count : 8.18 K/uL  RBC Count : 2.67 M/uL  Hemoglobin : 8.3 g/dL  Hematocrit : 24.1 %  Platelet Count - Automated : 288 K/uL  Mean Cell Volume : 90.3 fl  Mean Cell Hemoglobin : 31.1 pg  Mean Cell Hemoglobin Concentration : 34.4 g/dL  Auto Neutrophil # : x  Auto Lymphocyte # : x  Auto Monocyte # : x  Auto Eosinophil # : x  Auto Basophil # : x  Auto Neutrophil % : x  Auto Lymphocyte % : x  Auto Monocyte % : x  Auto Eosinophil % : x  Auto Basophil % : x    11-20    143  |  112[H]  |  39[H]  ----------------------------<  278[H]  3.5   |  25  |  0.77    Ca    8.7      20 Nov 2024 05:16  Phos  3.0     11-20  Mg     1.9     11-20      CAPILLARY BLOOD GLUCOSE      POCT Blood Glucose.: 295 mg/dL (20 Nov 2024 07:46)      Urinalysis Basic - ( 20 Nov 2024 05:16 )    Color: x / Appearance: x / SG: x / pH: x  Gluc: 278 mg/dL / Ketone: x  / Bili: x / Urobili: x   Blood: x / Protein: x / Nitrite: x   Leuk Esterase: x / RBC: x / WBC x   Sq Epi: x / Non Sq Epi: x / Bacteria: x          Culture Results:   No growth at 24 hours (11-18 @ 12:18)  Culture Results:   No growth at 24 hours (11-18 @ 12:10)      EKG:    ECHO, US:    RADIOLOGY:    CRITICAL CARE TIME SPENT:        Assessment and Recommendation:   · Assessment	      ASSESSMENT  ___________GI____________  # Gastrointestinal bleeding  # 2 Large gastric ulcers  # Melena   # Duodenitis  - Patients Hb 8.3  - On presentation 10.1  - As low as 6.8 on 11/19  - Likely 2/2 GIB given episodes of melena   - S/P 1U PRBC on 11/19  - S/P EGD showing 2 Large non bleeding ulcers in the stomach and no signs of activie bleeding in the duodeum  - CT Showing duodenitis  - S/P 2U PRBC on 11/20 s/p EGD  - Total PRBC transfused this admission: 3U as of 11/20  - F/U Post transfusion CBC  - Trend CBC q6h    ___________ID____________  # SIRS vs Sepsis   - Patient meeting SIRS sepsis criteria without clear source of infection  - Elevated procalcitonin  - Negative Lactate  - Hemodynamically stable and afebrile  - UA negative  - FLU/COVID negative  - Blood cultures NGTD  - CXR negative for acute intrathorasic pathology  - CT A/P showing Duodenitis  - Continue Ceftriaxone  - ID Consulted Dr. Swain    _________ENDO__________  # DM  - Patient with history of DM  - Start patient on Insulin sliding scale q6h while NPO  - Start patient on Finger sticks q6h while NPO  - Once ready to resume PO diet, start Diabetic Diet, NPO for now  - A1c of 7.4    ______HEME/ONC_______  # anemia  - Patients Hb 8.3  - On presentation 10.1  - As low as 6.8 on 11/19  - Likely 2/2 GIB given episodes of melena   - S/P 1U PRBC on 11/19  - S/P EGD showing 2 Large non bleeding ulcers in the stomach and no signs of activie bleeding in the duodeum  - CT Showing duodenitis  - S/P 2U PRBC on 11/20 s/p EGD  - Total PRBC transfused this admission: 3U as of 11/20  - F/U Post transfusion CBC  - Trend CBC q6h  ________PROPHY_______  #DVT  - SCD for DVT PPX given GIB   #GI   - BID PPI    ______GOC/DISPO___________  - ICU for close monitoring

## 2024-11-21 DIAGNOSIS — K92.1 MELENA: ICD-10-CM

## 2024-11-21 LAB
ANION GAP SERPL CALC-SCNC: 4 MMOL/L — LOW (ref 5–17)
BUN SERPL-MCNC: 28 MG/DL — HIGH (ref 7–18)
CALCIUM SERPL-MCNC: 8.6 MG/DL — SIGNIFICANT CHANGE UP (ref 8.4–10.5)
CHLORIDE SERPL-SCNC: 117 MMOL/L — HIGH (ref 96–108)
CO2 SERPL-SCNC: 24 MMOL/L — SIGNIFICANT CHANGE UP (ref 22–31)
CREAT SERPL-MCNC: 0.6 MG/DL — SIGNIFICANT CHANGE UP (ref 0.5–1.3)
EGFR: 87 ML/MIN/1.73M2 — SIGNIFICANT CHANGE UP
GLUCOSE BLDC GLUCOMTR-MCNC: 130 MG/DL — HIGH (ref 70–99)
GLUCOSE BLDC GLUCOMTR-MCNC: 160 MG/DL — HIGH (ref 70–99)
GLUCOSE BLDC GLUCOMTR-MCNC: 188 MG/DL — HIGH (ref 70–99)
GLUCOSE BLDC GLUCOMTR-MCNC: 190 MG/DL — HIGH (ref 70–99)
GLUCOSE BLDC GLUCOMTR-MCNC: 193 MG/DL — HIGH (ref 70–99)
GLUCOSE SERPL-MCNC: 188 MG/DL — HIGH (ref 70–99)
HCT VFR BLD CALC: 29.6 % — LOW (ref 34.5–45)
HGB BLD-MCNC: 10.5 G/DL — LOW (ref 11.5–15.5)
MAGNESIUM SERPL-MCNC: 1.8 MG/DL — SIGNIFICANT CHANGE UP (ref 1.6–2.6)
MCHC RBC-ENTMCNC: 30.3 PG — SIGNIFICANT CHANGE UP (ref 27–34)
MCHC RBC-ENTMCNC: 35.5 G/DL — SIGNIFICANT CHANGE UP (ref 32–36)
MCV RBC AUTO: 85.3 FL — SIGNIFICANT CHANGE UP (ref 80–100)
MRSA PCR RESULT.: SIGNIFICANT CHANGE UP
NRBC # BLD: 0 /100 WBCS — SIGNIFICANT CHANGE UP (ref 0–0)
PHOSPHATE SERPL-MCNC: 2.3 MG/DL — LOW (ref 2.5–4.5)
PLATELET # BLD AUTO: 229 K/UL — SIGNIFICANT CHANGE UP (ref 150–400)
POTASSIUM SERPL-MCNC: 3.8 MMOL/L — SIGNIFICANT CHANGE UP (ref 3.5–5.3)
POTASSIUM SERPL-SCNC: 3.8 MMOL/L — SIGNIFICANT CHANGE UP (ref 3.5–5.3)
RBC # BLD: 3.47 M/UL — LOW (ref 3.8–5.2)
RBC # FLD: 16.4 % — HIGH (ref 10.3–14.5)
S AUREUS DNA NOSE QL NAA+PROBE: SIGNIFICANT CHANGE UP
SODIUM SERPL-SCNC: 145 MMOL/L — SIGNIFICANT CHANGE UP (ref 135–145)
WBC # BLD: 7.82 K/UL — SIGNIFICANT CHANGE UP (ref 3.8–10.5)
WBC # FLD AUTO: 7.82 K/UL — SIGNIFICANT CHANGE UP (ref 3.8–10.5)

## 2024-11-21 PROCEDURE — 99232 SBSQ HOSP IP/OBS MODERATE 35: CPT | Mod: FS,GC

## 2024-11-21 RX ORDER — SODIUM,POTASSIUM PHOSPHATES 278-250MG
1 POWDER IN PACKET (EA) ORAL ONCE
Refills: 0 | Status: COMPLETED | OUTPATIENT
Start: 2024-11-21 | End: 2024-11-21

## 2024-11-21 RX ORDER — ENALAPRIL MALEATE 2.5 MG/1
2.5 TABLET ORAL DAILY
Refills: 0 | Status: DISCONTINUED | OUTPATIENT
Start: 2024-11-21 | End: 2024-11-21

## 2024-11-21 RX ORDER — METOPROLOL TARTRATE 100 MG/1
50 TABLET, FILM COATED ORAL
Refills: 0 | Status: DISCONTINUED | OUTPATIENT
Start: 2024-11-21 | End: 2024-11-21

## 2024-11-21 RX ORDER — PANTOPRAZOLE SODIUM 40 MG/1
40 TABLET, DELAYED RELEASE ORAL
Refills: 0 | Status: DISCONTINUED | OUTPATIENT
Start: 2024-11-21 | End: 2024-11-23

## 2024-11-21 RX ADMIN — Medication 1: at 07:43

## 2024-11-21 RX ADMIN — CHLORHEXIDINE GLUCONATE 1 APPLICATION(S): 1.2 RINSE ORAL at 06:24

## 2024-11-21 RX ADMIN — PANTOPRAZOLE SODIUM 40 MILLIGRAM(S): 40 TABLET, DELAYED RELEASE ORAL at 17:23

## 2024-11-21 RX ADMIN — PANTOPRAZOLE SODIUM 10 MG/HR: 40 TABLET, DELAYED RELEASE ORAL at 09:08

## 2024-11-21 RX ADMIN — PANTOPRAZOLE SODIUM 10 MG/HR: 40 TABLET, DELAYED RELEASE ORAL at 06:24

## 2024-11-21 RX ADMIN — Medication 1: at 12:33

## 2024-11-21 RX ADMIN — Medication 1 PACKET(S): at 06:24

## 2024-11-21 RX ADMIN — Medication 1: at 17:23

## 2024-11-21 NOTE — PHYSICAL THERAPY INITIAL EVALUATION ADULT - THERAPY FREQUENCY, PT EVAL
Patient discharged to home. Discharge papers, follow-up appointments, and medication regimen reviewed with patient. Patient verbalized understanding. IV removed and belongings packed up and brought down with self. Patient escorted to Main Entrance and driven home by wife.  
3-5x/week

## 2024-11-21 NOTE — PROGRESS NOTE ADULT - SUBJECTIVE AND OBJECTIVE BOX
INTERVAL HPI/OVERNIGHT EVENTS:  Patient seen,hemodinamicly stable,s/p egd,no active bleeding  VITAL SIGNS:  T(F): 97.6 (11-21-24 @ 07:00)  HR: 73 (11-21-24 @ 07:00)  BP: 145/59 (11-21-24 @ 07:00)  RR: 16 (11-21-24 @ 07:00)  SpO2: 95% (11-21-24 @ 07:00)  Wt(kg): --    PHYSICAL EXAM:  awake  Constitutional:  Eyes:  ENMT:perrla  Neck:  Respiratory:clear  Cardiovascular:s1s2,m-none  Gastrointestinal:soft,bs pos  Extremities:  Vascular:  Neurological:no focal deficit  Musculoskeletal:    MEDICATIONS  (STANDING):  chlorhexidine 2% Cloths 1 Application(s) Topical <User Schedule>  insulin lispro (ADMELOG) corrective regimen sliding scale   SubCutaneous three times a day before meals  insulin lispro (ADMELOG) corrective regimen sliding scale   SubCutaneous at bedtime  pantoprazole Infusion 8 mG/Hr (10 mL/Hr) IV Continuous <Continuous>    MEDICATIONS  (PRN):      Allergies    No Known Allergies    Intolerances        LABS:                        10.5   7.82  )-----------( 229      ( 21 Nov 2024 04:00 )             29.6     11-21    145  |  117[H]  |  28[H]  ----------------------------<  188[H]  3.8   |  24  |  0.60    Ca    8.6      21 Nov 2024 04:00  Phos  2.3     11-21  Mg     1.8     11-21    TPro  6.0  /  Alb  2.6[L]  /  TBili  0.4  /  DBili  x   /  AST  45[H]  /  ALT  45  /  AlkPhos  59  11-20      Urinalysis Basic - ( 21 Nov 2024 04:00 )    Color: x / Appearance: x / SG: x / pH: x  Gluc: 188 mg/dL / Ketone: x  / Bili: x / Urobili: x   Blood: x / Protein: x / Nitrite: x   Leuk Esterase: x / RBC: x / WBC x   Sq Epi: x / Non Sq Epi: x / Bacteria: x        RADIOLOGY & ADDITIONAL TESTS:      Assessment and Recommendation:   · Assessment	      ASSESSMENT  Patient is An 84 year old female, from home, lives with son, ambulating with a cane, with PMH of HTN, HLD, DM, Dementia (A&Ox2 baseline), who presented to the ED with AMS. Patient now at baseline mentation. Initially admitted to medicine for sepsis. Patient had an episode on Melena on 11/19 and than CBC showing drow of Hb from 10 to 6.  1U PRBC given and Hb improved to 8s.  Patient again had further episodes of melena on 11/20, went for EGD and was found to have 2 large non bleeding ulcers.  Patient was transfused 1U PRBC and ordered for a second unit.  ICU was consulted for close monitoring in GI bleeding.          _________CNS___________  # mentation  # Dementia  - Baseline A&Ox2  - Currently at baseline mentation      _________CVS___________  # HTN  - Patient with history of HTN  - Patient on Enalapril and Metoprolol at home  - HOLD antihypertensives for now given GIB    - Patient with history of HLD  - Patient on Statin at home  - Continue home antihyperlipidemic     ___________GI____________  # Gastrointestinal bleeding  # 2 Large gastric ulcers  # Melena   # Duodenitis  - S/P EGD showing 2 Large non bleeding ulcers in the stomach and no signs of activie bleeding in the duodeum  - CT Showing duodenitis  - S/P 2U PRBC on 11/20 s/p EGD  - Total PRBC transfused this admission: 3U as of 11/20  - F/U Post transfusion CBC  - NPO  - PPI infusion  - Transfuse as needed to maintain Hb > 7  - Maintain 2 large bore IVs  - Per GI EGD note if patient has epsiode of rebleeding consult IR and / or surgery as no endoscopic options  - GI Consulted Dr. Galdamez    ___________ID____________  # SIRS vs Sepsis   - Patient meeting SIRS sepsis criteria without clear source of infection  - Elevated procalcitonin  - Negative Lactate  - Hemodynamically stable and afebrile  - UA negative  - FLU/COVID negative  - Blood cultures NGTD  - CXR negative for acute intrathorasic pathology  - CT A/P showing Duodenitis  - Continue Ceftriaxone  - ID Consulted Dr. Swain    _________ENDO__________  # DM  - Patient with history of DM  - Start patient on Insulin sliding scale q6h while NPO  - Start patient on Finger sticks q6h while NPO  - Once ready to resume PO diet, start Diabetic Diet, NPO for now  - A1c of 7.4    ______HEME/ONC_______  # anemia  - Patients Hb 8.3  - On presentation 10.1  - As low as 6.8 on 11/19  - Likely 2/2 GIB given episodes of melena   - S/P 1U PRBC on 11/19  - S/P EGD showing 2 Large non bleeding ulcers in the stomach and no signs of activie bleeding in the duodeum  - CT Showing duodenitis  - S/P 2U PRBC on 11/20 s/p EGD  - Total PRBC transfused this admission: 3U as of 11/20  - F/U Post transfusion CBC  - Trend CBC q6h    _________SKIN____________  # No acute pathology    ________PROPHY_______  #DVT  - SCD for DVT PPX given GIB   #GI   - BID PPI    ______GOC/DISPO___________  - ICU for close monitoring

## 2024-11-21 NOTE — PHYSICAL THERAPY INITIAL EVALUATION ADULT - PERTINENT HX OF CURRENT PROBLEM, REHAB EVAL
Admitted due to sepsis, altered mental status; anemia s/p 3PRBC transfusions; s/p melena noted on EGD  PMHx of DM, HTN, and HLD

## 2024-11-21 NOTE — DIETITIAN INITIAL EVALUATION ADULT - PERTINENT LABORATORY DATA
11-21    145  |  117[H]  |  28[H]  ----------------------------<  188[H]  3.8   |  24  |  0.60    Ca    8.6      21 Nov 2024 04:00  Phos  2.3     11-21  Mg     1.8     11-21    TPro  6.0  /  Alb  2.6[L]  /  TBili  0.4  /  DBili  x   /  AST  45[H]  /  ALT  45  /  AlkPhos  59  11-20  POCT Blood Glucose.: 188 mg/dL (11-21-24 @ 07:40)  A1C with Estimated Average Glucose Result: 7.4 % (11-18-24 @ 08:14)

## 2024-11-21 NOTE — PROGRESS NOTE ADULT - SUBJECTIVE AND OBJECTIVE BOX
GI Progress Note    Patient is a 84y old  Female who presents with a chief complaint of Altered mental status     (21 Nov 2024 08:10)    GI was consulted for Melena.    24-HOUR INTERVAL EVENTS: Patient seen and examined in ICU. Patient resting in bed. s/p EGD. NPO. At least 3 episodes of melena recorded post procedure    MEDICATIONS  (STANDING):  chlorhexidine 2% Cloths 1 Application(s) Topical <User Schedule>  insulin lispro (ADMELOG) corrective regimen sliding scale   SubCutaneous three times a day before meals  insulin lispro (ADMELOG) corrective regimen sliding scale   SubCutaneous at bedtime  pantoprazole Infusion 8 mG/Hr (10 mL/Hr) IV Continuous <Continuous>    MEDICATIONS  (PRN):    __________________________________________________  REVIEW OF SYSTEMS:  A detailed set of ROS were asked and negative except those outlined in GI HPI above/below.   ________________________________________________  PHYSICAL EXAM    Vital Signs Last 24 Hrs  T(C): 36.4 (21 Nov 2024 07:00), Max: 37.1 (20 Nov 2024 15:00)  T(F): 97.6 (21 Nov 2024 07:00), Max: 98.7 (20 Nov 2024 15:00)  HR: 73 (21 Nov 2024 07:00) (71 - 112)  BP: 145/59 (21 Nov 2024 07:00) (91/67 - 152/65)  BP(mean): 85 (21 Nov 2024 07:00) (64 - 112)  RR: 16 (21 Nov 2024 07:00) (14 - 23)  SpO2: 95% (21 Nov 2024 07:00) (90% - 100%)    Parameters below as of 21 Nov 2024 07:00  Patient On (Oxygen Delivery Method): room air        GEN: NAD  HEENT: EOMI, conjunctivae anicteric, neck supple, moist mucous membranes  PULM: LCTAB, no wheezing, rales, or rhonchi  CV: RRR, no m/r/g  GI: soft, NT, ND; +BS in all four quadrants, no ascites, no Chisholm's sign  MSK: BROCK, no edema  NEURO: A&O x 3, no gross deficits  _________________________________________________  LABS:                        10.5   7.82  )-----------( 229      ( 21 Nov 2024 04:00 )             29.6     11-21    145  |  117[H]  |  28[H]  ----------------------------<  188[H]  3.8   |  24  |  0.60    Ca    8.6      21 Nov 2024 04:00  Phos  2.3     11-21  Mg     1.8     11-21    TPro  6.0  /  Alb  2.6[L]  /  TBili  0.4  /  DBili  x   /  AST  45[H]  /  ALT  45  /  AlkPhos  59  11-20      Urinalysis Basic - ( 21 Nov 2024 04:00 )    Color: x / Appearance: x / SG: x / pH: x  Gluc: 188 mg/dL / Ketone: x  / Bili: x / Urobili: x   Blood: x / Protein: x / Nitrite: x   Leuk Esterase: x / RBC: x / WBC x   Sq Epi: x / Non Sq Epi: x / Bacteria: x      CAPILLARY BLOOD GLUCOSE      POCT Blood Glucose.: 188 mg/dL (21 Nov 2024 07:40)  POCT Blood Glucose.: 190 mg/dL (21 Nov 2024 01:05)  POCT Blood Glucose.: 163 mg/dL (20 Nov 2024 16:57)  POCT Blood Glucose.: 202 mg/dL (20 Nov 2024 11:10)        RADIOLOGY & ADDITIONAL TESTS:        < from: EGD (11.20.24 @ 00:00) >    Impressions:        Esophagus: normal. Regular Z line. No bleeding, no varices, no esophagitis. .            Stomach: no evidence of bleeding. 1 cm HH. No ulcers. Pylorus slightly    edematous/distorted. .        Duodenum: ulcerated area almost circumferentially taking up the bulb. 3 cm    ischemic area L wall, 2 cm R wall, unclear if confluent. Noevidence of active    bleeding. Ischemic, with white eschar. .          This note and its recommendations herein are preliminary until such time as cosigned by an attending.

## 2024-11-21 NOTE — CHART NOTE - NSCHARTNOTEFT_GEN_A_CORE
ICU transfer Note    84 F, living w/ son at home, ambulating w/ a cane, w/ Dementia (AAOx2-3), w/ PMHx of DM, HTN, and HLD BIBEMS due to AMS, fatigue, decreased appetite, and watery stools. Patient returned to baseline in ED. As per daughter, pt was having decreased appetite, one episode of non bloody emesis, 2 episodes of non-bloody, watery stools, abdominal pain, weakness, & confusion for the last 2 days. Daughter gave her Prilosec yesterday which resolved her abdominal pain. Admitted to medicine for SIRS.     Melena 11/19 > CBC showing drop of Hb from 10 to 6. 1U PRBC given and Hb improved to 8s. Patient again had further episodes of melena on 11/20, went for EGD and was found to have 2 large non bleeding ulcers.  Patient was transfused 2U PRBC s/p egd. ICU was consulted for close monitoring in GI bleeding post egd. Pt was started on a PPI infusion and then was transitioned to po PPI BID. total of 3 units of PRBC given. Hb lateralized to 10-11, no bleeding since post egd.     Patient prefers female RNs/PCA.     Follow up:  -Restart po home meds    Discussed with PGY-1 Dr. Parrish covering Floor and Attending Dr. Sanchez (nocturnist). Patient will be transfered to floor. Discussed with ICU attending. Patient clinically stable for downgrade.      Consultants:  Dr. Denice Fortune

## 2024-11-21 NOTE — PHYSICAL THERAPY INITIAL EVALUATION ADULT - REFERRING PHYSICIAN, REHAB EVAL
Anthony Mays Micah Courtney is a 22 month old male presenting with parents.     1. Establish care  2. Snoring/breathing     Denies known Latex allergy or symptoms of Latex sensitivity.    Medications reviewed and updated.    Health Maintenance Due   Topic Date Due   • Hepatitis B Vaccine (1 of 3 - 3-dose primary series) 2017   • IPV Vaccine (1 of 4 - 4-dose series) 2017   • DTaP/Tdap/Td Vaccine (1 - DTaP) 2017   • MMR Vaccine (1 of 2 - Standard series) 07/02/2018   • Varicella Vaccine (1 of 2 - 2-dose childhood series) 07/02/2018   • Pneumococcal Vaccine (1 of 2 - Start at 12 months series) 07/02/2018   • Hepatitis A Vaccine (1 of 2 - 2-dose series) 07/02/2018   • HIB Vaccine (1 of 1 - Start at 15 months series) 10/02/2018       Patient is due for topics as listed above but is not proceeding with at this time. (Parents decline all vaccines)                 oral

## 2024-11-21 NOTE — PROGRESS NOTE ADULT - ASSESSMENT
84 F, Dementia (AAOx2-3) DM, HTN, HLD was BIBEMS due to AMS, decreased appetite, and watery stools. Admitted to medicine for sepsis. Melena 11/19 > CBC showing drop of Hb from 10 to 6. 1U PRBC given and Hb improved to 8s. Pt had further episodes of melena on 11/20, went for EGD > 2 large non bleeding ulcers. Transfused 1U PRBC. Admittted to ICU for close monitoring for GIB.    _________CNS___________  # Dementia  Baseline A&Ox2; Currently at baseline     _________CVS___________  # HTN  Patient with history of HTN (Enalapril and Metoprolol at home)  HOLD antihypertensives for now given GIB    #HLD  Hx of HLD on Statin at home  c/w home antihyperlipidemic once not npo    _________RESP__________  # No Acute Pathology     ___________GI____________  # Gastrointestinal bleeding  # 2 Large gastric ulcers  # Melena   # Duodenitis  Patient had 3-4 episodes of melena between 11/19 and 11/20  Hb dropped from 10 on admission to 6.8  Likely 2/2 GIB given episodes of melena   S/P 1U PRBC on 11/19  S/P EGD showing 2 Large non bleeding ulcers in the stomach and no signs of activie bleeding in the duodeum  CT Showing duodenitis  S/P 2U PRBC on 11/20 s/p EGD  Total PRBC transfused this admission: 3U as of 11/20  F/U Post transfusion CBC  Trend CBC q6h  NPO  PPI infusion  Transfuse as needed to maintain Hb > 7  Maintain 2 large bore IVs  Per GI EGD note if patient has epsiode of rebleeding consult IR and / or surgery as no endoscopic options  GI Consulted Dr. Galdamez    ________ RENAL__________  # No acute Pathology    __________MSK___________  # No acute Pathology    ___________ID____________  # SIRS   Patient meeting SIRS sepsis criteria without clear source of infection  Elevated procalcitonin  Negative Lactate  Hemodynamically stable and afebrile  UA negative  FLU/COVID negative  Blood cultures NGTD  CXR negative for acute intrathorasic pathology  CT A/P showing Duodenitis  Continue Ceftriaxone  ID Consulted Dr. Swain    _________ENDO__________  # DM  Patient with history of DM  Start patient on Insulin sliding scale q6h while NPO  Start patient on Finger sticks q6h while NPO  Once ready to resume PO diet, start Diabetic Diet, NPO for now  A1c of 7.4    ______HEME/ONC_______  # anemia  Patients Hb 8.3  On presentation 10.1  As low as 6.8 on 11/19  Likely 2/2 GIB given episodes of melena   S/P 1U PRBC on 11/19  S/P EGD showing 2 Large non bleeding ulcers in the stomach and no signs of activie bleeding in the duodeum  CT Showing duodenitis  S/P 2U PRBC on 11/20 s/p EGD  Total PRBC transfused this admission: 3U as of 11/20  Trend CBC q6h    _________SKIN____________  # No acute pathology    ________PROPHY_______  #DVT  SCD for DVT PPX given GIB   #GI  BID PPI    ______GOC/DISPO___________  ICU for close monitoring

## 2024-11-21 NOTE — DIETITIAN INITIAL EVALUATION ADULT - PERTINENT MEDS FT
MEDICATIONS  (STANDING):  chlorhexidine 2% Cloths 1 Application(s) Topical <User Schedule>  insulin lispro (ADMELOG) corrective regimen sliding scale   SubCutaneous three times a day before meals  insulin lispro (ADMELOG) corrective regimen sliding scale   SubCutaneous at bedtime  pantoprazole Infusion 8 mG/Hr (10 mL/Hr) IV Continuous <Continuous>    MEDICATIONS  (PRN):

## 2024-11-21 NOTE — PROGRESS NOTE ADULT - NS ATTEND AMEND GEN_ALL_CORE FT
Agree with above.    Patient was seen by our team on 11-21-24. I agree with the findings and plan of care as documented in the fellow/resident/medical student/physician assistant/nurse practitioner.    Today we are treating the patient for:   Duodenal ulcer  acute blood loss anemia    ***General note with plan / recommendation:   See my changes to note above. H/H stable. Can advance diet. continue with PPI 40mg BID and ensure that patient is discharged on this medication.      Billing:  I have reviewed records from labs    Other:  - Thank you for involving us in the care of this patient. If you have any questions regarding the plan of care please do not hesitate to call us back.

## 2024-11-21 NOTE — PHYSICAL THERAPY INITIAL EVALUATION ADULT - ACTIVE RANGE OF MOTION EXAMINATION, REHAB EVAL
both shoulders limited to 90 degrees of shoulder flexion and abduction/bilateral upper extremity Active ROM was WFL (within functional limits)/bilateral  lower extremity Active ROM was WFL (within functional limits)

## 2024-11-21 NOTE — PHYSICAL THERAPY INITIAL EVALUATION ADULT - DIAGNOSIS, PT EVAL
anemia, +melena, s/p 3 PRBC transfusions; generalized weakness, deconditioning; difficulty performing bed mobility, sitting, and standing activities; unsteady gait and transfers

## 2024-11-21 NOTE — PHYSICAL THERAPY INITIAL EVALUATION ADULT - LEVEL OF INDEPENDENCE: GAIT, REHAB EVAL
"  Assessment and Plan:       1. Medicare annual wellness visit, subsequent  Helen is  and lives independently.  She scores 5 on mini cog and has no cognitive deficits noted.  She now uses a cane and is quite sedentary.  She is independent in activities of daily living.  She does have a healthcare directive.    2. Age-related osteoporosis without current pathological fracture  She has been on Prolia since 2016.  She did note some right thigh pain after her last injection and wondered if it was related.  Her previous bone density studies were reviewed.  She has had significant improvement since beginning Prolia.  Management options were discussed.  She could either continue Prolia longer or take 1 infusion of Reclast and begin a \"drug-free holiday \".  She would be due for next injection at the end of February.  She was not ready to make a decision  today but will call back.  She was counseled that it is important to not just stop Prolia  - Vitamin D, Total (25-Hydroxy)    3. Essential hypertension  He is on losartan 25 mg daily with metoprolol 25 mg twice daily.  Blood pressure is excellent  - Comprehensive Metabolic Panel  - Urinalysis-UC if Indicated    4. Chronic atrial fibrillation  Heart rate is under good control.  She is on warfarin as an anticoagulant    5. ICD (implantable cardioverter-defibrillator), single, in situ  Helen had an single-chamber ICD placed on 2/28/19 after she presented to [facility] with syncope.  She actually had been seen in clinic earlier and had a Holter monitor on during the event which showed atrial fibrillation with complete heart block and runs of ventricular tachycardia suggestive of torsade de pointe.  She has done well since pacemaker placement but is concerned about how prominent it is under her skin.    6. Gastroesophageal reflux disease, esophagitis presence not specified  Stable on Protonix    7. Mixed hyperlipidemia  She has not tolerated atorvastatin and " "simvastatin in the past.  Most recent lipids were fair on dietary management alone  - Lipid Jones    8. Encounter for therapeutic drug monitoring  Monitoring labs as outlined  - Comprehensive Metabolic Panel  - HM2(CBC w/o Differential)    9.  Osteoarthritis of multiple joints:  She should not use NSAIDs since she is on anticoagulation.  Was advised to try Tumeric and Tylenol  Patient Instructions:  1.  Could try Tumeric for knee pain.  Also use tylenol.    2.  Either Reclast or Prolia at  end of the month    3.  I will notify you of test results.    4.  See in six months or as needed    5.  The patient's current medical problems were reviewed.    I have had an Advance Directives discussion with the patient.  The following health maintenance schedule was reviewed with the patient and provided in printed form in the after visit summary:   Health Maintenance   Topic Date Due     HEART FAILURE ACTION PLAN  12/03/1932     MEDICARE ANNUAL WELLNESS VISIT  12/03/1995     ADVANCE CARE PLANNING  05/13/2019     BMP  02/13/2020     CBC  02/19/2020     ALT  08/13/2020     LIPID  08/13/2020     FALL RISK ASSESSMENT  08/13/2020     DXA SCAN  11/14/2021     TD 18+ HE  04/20/2025     TSH  Completed     PNEUMOCOCCAL IMMUNIZATION 65+ LOW/MEDIUM RISK  Completed     INFLUENZA VACCINE RULE BASED  Completed     ZOSTER VACCINES  Completed        Subjective:   Chief Complaint: Helen Beckham is an 87 y.o. female here for an Annual Wellness visit.   HPI:      Hypertension:  Her blood pressure is 102/60 today.     Knee Pain:  The patient says her right knee hurts. She says that she thinks it hurts because she hasn't been walking as much as she should be. She says that she used to have pain along her entire leg, but this has mostly resolved. She states that [facility] told her that her right knee was \"bone on bone\" .    Osteoporosis:   The patient has low bone density in her wrist. She wants to know if she could stop taking prolia " because she thinks it might have caused her leg pain.     Pacemaker:   Her pacemaker was turned down from 80 to 70. She says that she preferred to have it set at 80 because she had more energy. She says that she has been getting more short of breath since the rate was changed. She says that the wires are getting more noticeable.     Review of Systems:     Please see above.  The rest of the review of systems are negative for all systems.    Patient Care Team:  Surya Delaney MD as PCP - General  Surya Delaney MD as Assigned PCP  Eugene Lopez, PharmD as Pharmacist (Pharmacist)     Patient Active Problem List   Diagnosis     Mixed hyperlipidemia     Osteoporosis on Prolia     Essential hypertension     Chronic atrial fibrillation     Esophageal Reflux     Diverticulosis     Osteoarthritis     Diastolic CHF, chronic (H)     Torsades de pointes (H)     Complete heart block (H)     ICD (implantable cardioverter-defibrillator), single, in situ     Past Medical History:   Diagnosis Date     Cellulitis of third toe of right foot      Dermatitis 2017     Diverticulosis 1998     Edema 2014     Esophageal reflux 2006     Hyperlipidemia 2007     Hypertension 2008     Osteoarthritis 2005     Paroxysmal atrial fibrillation (H) 2011     Persistent atrial fibrillation 2015     Trochanteric bursitis     osteoarthritis     Venous insufficiency 2016      Past Surgical History:   Procedure Laterality Date     EYE SURGERY      bilateral cataract surgery     FRACTURE SURGERY      left toe     JOINT REPLACEMENT       [facility] CARDIOVERSION ELECTIVE ARRHYTHMIA EXTERNAL      Description: Elective Cardioversion External;  Recorded: 05/19/2014;  Comments: 4/28/14     [place] EXCISE HAND/FOOT NEUROMA      Description: Excision Of Neuroma Of Right Foot;  Proc Date: 04/01/2005;     [place] RECONSTR TOTAL SHOULDER IMPLANT      Description: Shoulder Arthroplasty Total Shoulder Replacement;  Proc Date: 07/13/2011;  Comments: REVERSE TOTAL SHOULDER      TONSILLECTOMY        Family History   Problem Relation Age of Onset     Heart disease Mother      No Medical Problems Father      COPD Sister      Kidney failure Brother      Liver disease Brother      No Medical Problems Son      No Medical Problems Daughter       She likes going to [employer] games.   Social History     Socioeconomic History     Marital status:      Spouse name: Not on file     Number of children: 2     Years of education: Not on file     Highest education level: Not on file   Occupational History     Not on file   Social Needs     Financial resource strain: Not on file     Food insecurity:     Worry: Not on file     Inability: Not on file     Transportation needs:     Medical: Not on file     Non-medical: Not on file   Tobacco Use     Smoking status: Never Smoker     Smokeless tobacco: Never Used   Substance and Sexual Activity     Alcohol use: No     Drug use: Yes     Types: Nitrous oxide     Sexual activity: Not Currently     Partners: Male   Lifestyle     Physical activity:     Days per week: Not on file     Minutes per session: Not on file     Stress: Not on file   Relationships     Social connections:     Talks on phone: Not on file     Gets together: Not on file     Attends Anabaptism service: Not on file     Active member of club or organization: Not on file     Attends meetings of clubs or organizations: Not on file     Relationship status: Not on file     Intimate partner violence:     Fear of current or ex partner: Not on file     Emotionally abused: Not on file     Physically abused: Not on file     Forced sexual activity: Not on file   Other Topics Concern     Not on file   Social History Narrative    She is .  She lives alone independently.  Her son Nigel is heavily involved in her care.  She has grandchildren and great-grandchildren.      Current Outpatient Medications   Medication Sig Dispense Refill     biotin 5,000 mcg TbDL Take by mouth.       cholecalciferol, vitamin  "D3, 1,000 unit tablet Take 1,000 Units by mouth daily.       clindamycin (CLEOCIN) 300 MG capsule Prior to dental appointments  0     COUMADIN 1 mg tablet Take 1 to 2 tablets (1 to 2 mg) by mouth daily. Adjust dose based on INR results as directed.(uses in conjunction with 2.5 mg tabs) 180 tablet 1     COUMADIN 2.5 mg tablet TAKE ONE-HALF TO ONE TABLET (1.25 MG TO 2.5 MG) BY MOUTH DAILY. ADJUST DOSE BASED ON INR RESULTS AS DIRECTED. 60 tablet 1     cyanocobalamin 1000 MCG tablet Take 1,000 mcg by mouth daily.       furosemide (LASIX) 20 MG tablet Take 1 tablet (20 mg total) by mouth daily. 90 tablet 3     losartan (COZAAR) 25 MG tablet Take 1 tablet (25 mg total) by mouth daily. 90 tablet 3     metoprolol tartrate (LOPRESSOR) 25 MG tablet Take 1 tablet (25 mg total) by mouth 2 (two) times a day. 180 tablet 3     multivitamin with minerals (THERA-M) 9 mg iron-400 mcg Tab tablet Take 1 tablet by mouth daily.       pantoprazole (PROTONIX) 40 MG tablet Take 40 mg by mouth daily.       potassium chloride (KLOR-CON) 10 MEQ CR tablet Take 1 tablet (10 mEq total) by mouth daily. 90 tablet 3     traMADol (ULTRAM) 50 mg tablet Take 1 tablet (50 mg total) by mouth every 6 (six) hours as needed for pain. 30 tablet 0     Current Facility-Administered Medications   Medication Dose Route Frequency Provider Last Rate Last Dose     denosumab 60 mg (PROLIA 60 mg/ml)  60 mg Subcutaneous Q6 Months Eugene Lopez, PharmD   60 mg at 08/27/19 1117      Objective:   Vital Signs:   Visit Vitals  /60 (Patient Site: Left Arm, Patient Position: Sitting, Cuff Size: Adult Regular)   Pulse 68   Ht 5' 0.63\" (1.54 m)   Wt 132 lb (59.9 kg)   SpO2 (!) 68%   BMI 25.25 kg/m         VisionScreening:  No exam data present     PHYSICAL EXAM  Constitutional:  Reveals an alert, pleasant, talkative woman. Affect appropriate.Ambulates with a single prong cane, gets on the exam table with minimal assistance. Vitals:  Per nursing notes.  Head: " Atraumatic.   Eyes: EOMs full  Ears:  Clear.  Oropharynx:  Without posterior nasal drainage or thrush.  Neck:  Supple, thyroid not palpable.   Back: Moderate kyphoscoliosis   Thorax: Pacemaker   Cardiac:  Regular rate and rhythm without murmurs, rubs, or gallops. Normal S1 S2.  Lungs: Clear.  Respiratory effort normal.  Abdomen:   Soft, Bowel sounds positive, nontender, nondistended.   Skin: No lesions suspicious for malignancy.   Extremities: No perpher edema, foot pulses intact.   Crepitance of knee right more than left.  Scar over left anterior shoulder, abduction limited to 90 degrees  Neurologic: Deep tendon reflexes symmerttical.   Psychiatric:  Mood appropriate, memory intact.     Assessment Results 2/11/2020   Activities of Daily Living No help needed   Instrumental Activities of Daily Living No help needed   Get Up and Go Score Less than 12 seconds   Mini Cog Total Score 5   Some recent data might be hidden     A Mini-Cog score of 0-2 suggests the possibility of dementia, score of 3-5 suggests no dementia    Identified Health Risks:     She is at risk for lack of exercise and has been provided with information to increase physical activity for the benefit of her well-being.  The patient was counseled and encouraged to consider modifying their diet and eating habits. She was provided with information on recommended healthy diet options.  She is at risk for falling and has been provided with information to reduce the risk of falling at home.  Information regarding advance directives (living sosa), including where she can download the appropriate form, was provided to the patient via the AVS.     ADDITIONAL HISTORY SUMMARIZED (2): None.  DECISION TO OBTAIN EXTRA INFORMATION (1): None.   RADIOLOGY TESTS (1): Reviewed DXA bone density scan from 11/14/2019. She has osteoporosis and a high fracture risk. Bone density in both hips has increased since 2017.   LABS (1): Labs ordered.  MEDICINE TESTS (1):  None.  INDEPENDENT REVIEW (2 each): Records of her ICD placement were reviewed pacemaker follow-up studies were reviewed    The visit lasted a total of 30 minutes face to face with the patient. Over 50% of the time was spent counseling and educating the patient about hypertension, knee pain, osteoporosis, and pacemakers.    IIván, am scribing for and in the presence of, Dr. Delaney.    I, Dr. Delaney, personally performed the services described in this documentation, as scribed by Iván Cervantes in my presence, and it is both accurate and complete.    Total data points: 4   minimum assist (75% patients effort)

## 2024-11-21 NOTE — PHYSICAL THERAPY INITIAL EVALUATION ADULT - FOLLOWS COMMANDS/ANSWERS QUESTIONS, REHAB EVAL
daughter Sury was at bedside, assisting with language translation/100% of the time/able to follow single-step instructions

## 2024-11-21 NOTE — PHYSICAL THERAPY INITIAL EVALUATION ADULT - PASSIVE RANGE OF MOTION EXAMINATION, REHAB EVAL
both shoulders limited to 90 degrees of shoulder flexion and abduction/bilateral upper extremity Passive ROM was WFL (within functional limits)/bilateral lower extremity Passive ROM was WFL (within functional limits)

## 2024-11-21 NOTE — DIETITIAN INITIAL EVALUATION ADULT - OTHER INFO
Pt lives home with family PTA, transferred to ICU for close monitoring, alert, confused with altered mental status, decreased intake x 2d PTA per H&P, 0-25% intake 11/18/24 per Flowsheet, changed to NPO x 1d; Unknown food allergies per Chart  Pt lives home with family PTA, transferred to ICU for close monitoring, alert, confused with altered mental status, decreased intake x 2d PTA per H&P, 0-25% intake 11/18/24 per Flowsheet, changed to NPO x 1d; Unknown food allergies per Chart; h/o DM, finger stick range=69 to 320, AsiO7W=2.4 noted

## 2024-11-21 NOTE — PROGRESS NOTE ADULT - ATTENDING COMMENTS
85 y/o female with HTN, HLD presenting with GI bleeding. Was undergoing endoscopy today when found to have a large duodenal ulcer with out evidence of acute bleeding. Given high risk of bleeding ICU consulted for evaluation. Patient is hemodynamically stable, received 1 unit PRBC during endoscopy and at time of evaluation receiving second unit of blood.     Assessment:  1. Acute blood loss anemia  2. Duodenal ulcer  3. Gastrointestinal bleeding   4. DM type 2     Plan:  - Close hemodynamic monitoring  - No bleeding reported  - Hgb relatively stable   - IV protonix per GI   - GI follow up, recommend IR vs. surgery evaluation if rebleeds  - Monitor for signs of bleeding  - Diet per GI   - Glucose monitoring  - Non chemical DVT prophylaxis  - Transfer out of ICU

## 2024-11-21 NOTE — PROGRESS NOTE ADULT - SUBJECTIVE AND OBJECTIVE BOX
Patient is a 84y old  Female who presents with a chief complaint of Sepsis (21 Nov 2024 09:35)      INTERVAL HPI/OVERNIGHT EVENTS:   No overnight events   Afebrile, hemodynamically stable     Subjective: Patient seen and examined at bedside.    ICU Vital Signs Last 24 Hrs  T(C): 36.4 (21 Nov 2024 12:00), Max: 37.1 (20 Nov 2024 15:00)  T(F): 97.5 (21 Nov 2024 12:00), Max: 98.7 (20 Nov 2024 15:00)  HR: 78 (21 Nov 2024 12:01) (71 - 95)  BP: 168/69 (21 Nov 2024 12:01) (111/61 - 168/69)  BP(mean): 96 (21 Nov 2024 12:01) (67 - 146)  ABP: --  ABP(mean): --  RR: 12 (21 Nov 2024 12:01) (12 - 23)  SpO2: 98% (21 Nov 2024 12:01) (90% - 100%)    O2 Parameters below as of 21 Nov 2024 12:01  Patient On (Oxygen Delivery Method): room air          I&O's Summary    20 Nov 2024 07:01  -  21 Nov 2024 07:00  --------------------------------------------------------  IN: 905 mL / OUT: 250 mL / NET: 655 mL    21 Nov 2024 07:01  -  21 Nov 2024 13:17  --------------------------------------------------------  IN: 0 mL / OUT: 500 mL / NET: -500 mL    PHYSICAL EXAM:  GENERAL: NAD; lying in bed; Frail and elderly  HEAD:  Atraumatic, Normocephalic  EYES: EOMI, PERRLA, conjunctiva and sclera clear  ENMT: No tonsillar erythema, exudates, or enlargement; Moist mucous membranes  NECK: Supple, normal appearance, No JVD; Normal thyroid; Trachea midline  NERVOUS SYSTEM:  Alert & Oriented X1,  Not fully cooperating with full exam, Motor Strength appears 5/5 B/L upper and lower extremities, sensation intact  CHEST/LUNG: Lungs clear to auscultation bilaterally, No rales, rhonchi, wheezing   HEART: Regular rate and rhythm; No murmurs, rubs, or gallops  ABDOMEN: Soft, Nontender, Nondistended; Bowel sounds present;   EXTREMITIES:  2+ Peripheral Pulses, No clubbing, cyanosis, or edema  SKIN: No rashes or lesions      LABS:                        10.5   7.82  )-----------( 229      ( 21 Nov 2024 04:00 )             29.6     11-21    145  |  117[H]  |  28[H]  ----------------------------<  188[H]  3.8   |  24  |  0.60    Ca    8.6      21 Nov 2024 04:00  Phos  2.3     11-21  Mg     1.8     11-21    TPro  6.0  /  Alb  2.6[L]  /  TBili  0.4  /  DBili  x   /  AST  45[H]  /  ALT  45  /  AlkPhos  59  11-20      Urinalysis Basic - ( 21 Nov 2024 04:00 )    Color: x / Appearance: x / SG: x / pH: x  Gluc: 188 mg/dL / Ketone: x  / Bili: x / Urobili: x   Blood: x / Protein: x / Nitrite: x   Leuk Esterase: x / RBC: x / WBC x   Sq Epi: x / Non Sq Epi: x / Bacteria: x      CAPILLARY BLOOD GLUCOSE      POCT Blood Glucose.: 193 mg/dL (21 Nov 2024 12:27)  POCT Blood Glucose.: 188 mg/dL (21 Nov 2024 07:40)  POCT Blood Glucose.: 190 mg/dL (21 Nov 2024 01:05)  POCT Blood Glucose.: 163 mg/dL (20 Nov 2024 16:57)        Consultant(s) Notes Reviewed:  [x ] YES  [ ] NO    MEDICATIONS  (STANDING):  chlorhexidine 2% Cloths 1 Application(s) Topical <User Schedule>  insulin lispro (ADMELOG) corrective regimen sliding scale   SubCutaneous three times a day before meals  insulin lispro (ADMELOG) corrective regimen sliding scale   SubCutaneous at bedtime  pantoprazole    Tablet 40 milliGRAM(s) Oral two times a day    MEDICATIONS  (PRN):      Care Discussed with Consultants/Other Providers [ x] YES  [ ] NO    RADIOLOGY & ADDITIONAL TESTS: Patient is a 84y old  Female who presents with a chief complaint of Sepsis (21 Nov 2024 09:35)      INTERVAL HPI/OVERNIGHT EVENTS:   No overnight events   Afebrile, hemodynamically stable     Subjective: Patient seen and examined at bedside. No melena overnight.    ICU Vital Signs Last 24 Hrs  T(C): 36.4 (21 Nov 2024 12:00), Max: 37.1 (20 Nov 2024 15:00)  T(F): 97.5 (21 Nov 2024 12:00), Max: 98.7 (20 Nov 2024 15:00)  HR: 78 (21 Nov 2024 12:01) (71 - 95)  BP: 168/69 (21 Nov 2024 12:01) (111/61 - 168/69)  BP(mean): 96 (21 Nov 2024 12:01) (67 - 146)  ABP: --  ABP(mean): --  RR: 12 (21 Nov 2024 12:01) (12 - 23)  SpO2: 98% (21 Nov 2024 12:01) (90% - 100%)    O2 Parameters below as of 21 Nov 2024 12:01  Patient On (Oxygen Delivery Method): room air          I&O's Summary    20 Nov 2024 07:01  -  21 Nov 2024 07:00  --------------------------------------------------------  IN: 905 mL / OUT: 250 mL / NET: 655 mL    21 Nov 2024 07:01  -  21 Nov 2024 13:17  --------------------------------------------------------  IN: 0 mL / OUT: 500 mL / NET: -500 mL    PHYSICAL EXAM:  GENERAL: NAD; lying in bed; Frail and elderly  HEAD:  Atraumatic, Normocephalic  EYES: EOMI, PERRLA, conjunctiva and sclera clear  ENMT: No tonsillar erythema, exudates, or enlargement; Moist mucous membranes  NECK: Supple, normal appearance, No JVD; Normal thyroid; Trachea midline  NERVOUS SYSTEM:  Alert & Oriented X1,  Not fully cooperating with full exam, Motor Strength appears 5/5 B/L upper and lower extremities, sensation intact  CHEST/LUNG: Lungs clear to auscultation bilaterally, No rales, rhonchi, wheezing   HEART: Regular rate and rhythm; No murmurs, rubs, or gallops  ABDOMEN: Soft, Nontender, Nondistended; Bowel sounds present;   EXTREMITIES:  2+ Peripheral Pulses, No clubbing, cyanosis, or edema  SKIN: No rashes or lesions      LABS:                        10.5   7.82  )-----------( 229      ( 21 Nov 2024 04:00 )             29.6     11-21    145  |  117[H]  |  28[H]  ----------------------------<  188[H]  3.8   |  24  |  0.60    Ca    8.6      21 Nov 2024 04:00  Phos  2.3     11-21  Mg     1.8     11-21    TPro  6.0  /  Alb  2.6[L]  /  TBili  0.4  /  DBili  x   /  AST  45[H]  /  ALT  45  /  AlkPhos  59  11-20      Urinalysis Basic - ( 21 Nov 2024 04:00 )    Color: x / Appearance: x / SG: x / pH: x  Gluc: 188 mg/dL / Ketone: x  / Bili: x / Urobili: x   Blood: x / Protein: x / Nitrite: x   Leuk Esterase: x / RBC: x / WBC x   Sq Epi: x / Non Sq Epi: x / Bacteria: x      CAPILLARY BLOOD GLUCOSE      POCT Blood Glucose.: 193 mg/dL (21 Nov 2024 12:27)  POCT Blood Glucose.: 188 mg/dL (21 Nov 2024 07:40)  POCT Blood Glucose.: 190 mg/dL (21 Nov 2024 01:05)  POCT Blood Glucose.: 163 mg/dL (20 Nov 2024 16:57)        Consultant(s) Notes Reviewed:  [x ] YES  [ ] NO    MEDICATIONS  (STANDING):  chlorhexidine 2% Cloths 1 Application(s) Topical <User Schedule>  insulin lispro (ADMELOG) corrective regimen sliding scale   SubCutaneous three times a day before meals  insulin lispro (ADMELOG) corrective regimen sliding scale   SubCutaneous at bedtime  pantoprazole    Tablet 40 milliGRAM(s) Oral two times a day    MEDICATIONS  (PRN):      Care Discussed with Consultants/Other Providers [ x] YES  [ ] NO    RADIOLOGY & ADDITIONAL TESTS:

## 2024-11-21 NOTE — PROGRESS NOTE ADULT - PROBLEM SELECTOR PLAN 1
- Keep NPO  - Maintain active T&S, 2 large bore peripheral IVs, transfuse for goal Hgb >7 or if symptomatic  - Trend H/H  -s/p EGD noted above, ulcerated area almost circumferentially taking up the bulb. 3 cm ischemic area L wall, 2 cm R wall, unclear if confluent. No evidence of active bleeding. Ischemic, with white eschar  -s/p 3 units PRBCs w/ appropriate response  - IV Protonix 40mg BID  - no endoscopic options, if rebleeds IR/surgery   - Fluid resuscitation/HD stability per primary team  - Avoid NSAIDs and hold anticoagulation (if safe per primary team)    This note and its recommendations herein are preliminary until such time as cosigned by an attending. - Maintain active T&S, 2 large bore peripheral IVs, transfuse for goal Hgb >7 or if symptomatic  - Trend H/H  -s/p EGD noted above, ulcerated area almost circumferentially taking up the bulb. 3 cm ischemic area L wall, 2 cm R wall, unclear if confluent. No evidence of active bleeding. Ischemic, with white eschar  -s/p 3 units PRBCs w/ appropriate response  - IV Protonix 40mg BID  -CLD, advance as tolerated  - no endoscopic options, if rebleeds IR/surgery   - Fluid resuscitation/HD stability per primary team  - Avoid NSAIDs and hold anticoagulation (if safe per primary team)    This note and its recommendations herein are preliminary until such time as cosigned by an attending. H/H stable which indicates no bleeding. Melena likely residual.     - Maintain active T&S, 2 large bore peripheral IVs, transfuse for goal Hgb >7 or if symptomatic  - Trend H/H  - s/p EGD noted above, ulcerated area almost circumferentially taking up the bulb. 3 cm ischemic area L wall, 2 cm R wall, unclear if confluent. No evidence of active bleeding. Ischemic, with white eschar  - s/p 3 units PRBCs w/ appropriate response  - Continue Protonix 40mg BID, can switch to PO  - CLD, advance as tolerated  - no endoscopic options, if rebleeds IR/surgery   - Fluid resuscitation/HD stability per primary team  - Avoid NSAIDs and hold anticoagulation (if safe per primary team)    This note and its recommendations herein are preliminary until such time as cosigned by an attending.

## 2024-11-21 NOTE — DIETITIAN INITIAL EVALUATION ADULT - FACTORS AFF FOOD INTAKE
Advanced age with acute on chronic comorbidities including acute encephalopathy, altered GI function of GI bleeding, h/o dementia/change in mental status/Sabianism/ethnic/cultural/personal food preferences

## 2024-11-21 NOTE — PROGRESS NOTE ADULT - ASSESSMENT
Patient is a 84y old  Female who presents with a chief complaint of Altered mental status. GI consulted for melena

## 2024-11-21 NOTE — PHYSICAL THERAPY INITIAL EVALUATION ADULT - PATIENT/FAMILY/SIGNIFICANT OTHER GOALS STATEMENT, PT EVAL
return home; be able to perform transfers, ambulation, ADLs independently while using a single-tip cane

## 2024-11-22 DIAGNOSIS — K92.2 GASTROINTESTINAL HEMORRHAGE, UNSPECIFIED: ICD-10-CM

## 2024-11-22 LAB
ANION GAP SERPL CALC-SCNC: 7 MMOL/L — SIGNIFICANT CHANGE UP (ref 5–17)
BASOPHILS # BLD AUTO: 0.02 K/UL — SIGNIFICANT CHANGE UP (ref 0–0.2)
BASOPHILS NFR BLD AUTO: 0.2 % — SIGNIFICANT CHANGE UP (ref 0–2)
BUN SERPL-MCNC: 22 MG/DL — HIGH (ref 7–18)
CALCIUM SERPL-MCNC: 9 MG/DL — SIGNIFICANT CHANGE UP (ref 8.4–10.5)
CHLORIDE SERPL-SCNC: 111 MMOL/L — HIGH (ref 96–108)
CO2 SERPL-SCNC: 25 MMOL/L — SIGNIFICANT CHANGE UP (ref 22–31)
CREAT SERPL-MCNC: 0.55 MG/DL — SIGNIFICANT CHANGE UP (ref 0.5–1.3)
EGFR: 89 ML/MIN/1.73M2 — SIGNIFICANT CHANGE UP
EOSINOPHIL # BLD AUTO: 0.17 K/UL — SIGNIFICANT CHANGE UP (ref 0–0.5)
EOSINOPHIL NFR BLD AUTO: 1.5 % — SIGNIFICANT CHANGE UP (ref 0–6)
GLUCOSE BLDC GLUCOMTR-MCNC: 115 MG/DL — HIGH (ref 70–99)
GLUCOSE BLDC GLUCOMTR-MCNC: 160 MG/DL — HIGH (ref 70–99)
GLUCOSE BLDC GLUCOMTR-MCNC: 206 MG/DL — HIGH (ref 70–99)
GLUCOSE BLDC GLUCOMTR-MCNC: 234 MG/DL — HIGH (ref 70–99)
GLUCOSE SERPL-MCNC: 170 MG/DL — HIGH (ref 70–99)
HCT VFR BLD CALC: 31.8 % — LOW (ref 34.5–45)
HGB BLD-MCNC: 11 G/DL — LOW (ref 11.5–15.5)
IMM GRANULOCYTES NFR BLD AUTO: 0.4 % — SIGNIFICANT CHANGE UP (ref 0–0.9)
LYMPHOCYTES # BLD AUTO: 1.41 K/UL — SIGNIFICANT CHANGE UP (ref 1–3.3)
LYMPHOCYTES # BLD AUTO: 12.5 % — LOW (ref 13–44)
MAGNESIUM SERPL-MCNC: 1.7 MG/DL — SIGNIFICANT CHANGE UP (ref 1.6–2.6)
MCHC RBC-ENTMCNC: 29.6 PG — SIGNIFICANT CHANGE UP (ref 27–34)
MCHC RBC-ENTMCNC: 34.6 G/DL — SIGNIFICANT CHANGE UP (ref 32–36)
MCV RBC AUTO: 85.7 FL — SIGNIFICANT CHANGE UP (ref 80–100)
MONOCYTES # BLD AUTO: 1 K/UL — HIGH (ref 0–0.9)
MONOCYTES NFR BLD AUTO: 8.9 % — SIGNIFICANT CHANGE UP (ref 2–14)
NEUTROPHILS # BLD AUTO: 8.62 K/UL — HIGH (ref 1.8–7.4)
NEUTROPHILS NFR BLD AUTO: 76.5 % — SIGNIFICANT CHANGE UP (ref 43–77)
NRBC # BLD: 0 /100 WBCS — SIGNIFICANT CHANGE UP (ref 0–0)
PHOSPHATE SERPL-MCNC: 2.6 MG/DL — SIGNIFICANT CHANGE UP (ref 2.5–4.5)
PLATELET # BLD AUTO: 268 K/UL — SIGNIFICANT CHANGE UP (ref 150–400)
POTASSIUM SERPL-MCNC: 3.6 MMOL/L — SIGNIFICANT CHANGE UP (ref 3.5–5.3)
POTASSIUM SERPL-SCNC: 3.6 MMOL/L — SIGNIFICANT CHANGE UP (ref 3.5–5.3)
RBC # BLD: 3.71 M/UL — LOW (ref 3.8–5.2)
RBC # FLD: 15.4 % — HIGH (ref 10.3–14.5)
SODIUM SERPL-SCNC: 143 MMOL/L — SIGNIFICANT CHANGE UP (ref 135–145)
WBC # BLD: 11.26 K/UL — HIGH (ref 3.8–10.5)
WBC # FLD AUTO: 11.26 K/UL — HIGH (ref 3.8–10.5)

## 2024-11-22 RX ORDER — ENALAPRIL MALEATE 2.5 MG/1
2.5 TABLET ORAL DAILY
Refills: 0 | Status: DISCONTINUED | OUTPATIENT
Start: 2024-11-22 | End: 2024-11-23

## 2024-11-22 RX ORDER — HALOPERIDOL 2 MG
1 TABLET ORAL ONCE
Refills: 0 | Status: COMPLETED | OUTPATIENT
Start: 2024-11-22 | End: 2024-11-22

## 2024-11-22 RX ORDER — ACETAMINOPHEN, DIPHENHYDRAMINE HCL, PHENYLEPHRINE HCL 325; 25; 5 MG/1; MG/1; MG/1
3 TABLET ORAL ONCE
Refills: 0 | Status: COMPLETED | OUTPATIENT
Start: 2024-11-22 | End: 2024-11-22

## 2024-11-22 RX ORDER — METOPROLOL TARTRATE 100 MG/1
50 TABLET, FILM COATED ORAL
Refills: 0 | Status: DISCONTINUED | OUTPATIENT
Start: 2024-11-22 | End: 2024-11-23

## 2024-11-22 RX ADMIN — Medication 10 MILLIGRAM(S): at 21:31

## 2024-11-22 RX ADMIN — METOPROLOL TARTRATE 50 MILLIGRAM(S): 100 TABLET, FILM COATED ORAL at 18:05

## 2024-11-22 RX ADMIN — ACETAMINOPHEN, DIPHENHYDRAMINE HCL, PHENYLEPHRINE HCL 3 MILLIGRAM(S): 325; 25; 5 TABLET ORAL at 22:50

## 2024-11-22 RX ADMIN — PANTOPRAZOLE SODIUM 40 MILLIGRAM(S): 40 TABLET, DELAYED RELEASE ORAL at 18:11

## 2024-11-22 NOTE — DISCHARGE NOTE PROVIDER - NSDCCPCAREPLAN_GEN_ALL_CORE_FT
PRINCIPAL DISCHARGE DIAGNOSIS  Diagnosis: Acute upper GI bleed  Assessment and Plan of Treatment:       SECONDARY DISCHARGE DIAGNOSES  Diagnosis: Acute encephalopathy  Assessment and Plan of Treatment:     Diagnosis: Abdominal pain, vomiting, and diarrhea  Assessment and Plan of Treatment:     Diagnosis: DM (diabetes mellitus)  Assessment and Plan of Treatment:     Diagnosis: HLD (hyperlipidemia)  Assessment and Plan of Treatment:     Diagnosis: HTN (hypertension)  Assessment and Plan of Treatment:      PRINCIPAL DISCHARGE DIAGNOSIS  Diagnosis: Acute upper GI bleed  Assessment and Plan of Treatment: You were diagnosed with Gastrointestinal bleed. Your hemoglobin dropped from 10.1 to 6.8.   You were treated with IV Protonix, blood transfusion and close monitoring of your blood counts. You were given a total of 3 units of blood and your hemoglobin remained stable at around 10-11. You had an endoscopy done which showed no active bleeding, but the prescence of two duodenal ulcers.  Please START taking:  PANTOPRAZOLE (PROTONIX) 40mg one tablet TWICE PER DAY  .  .  Please STOP taking:  MELOXCIAM  NEXIUM  any NSAID medications including pain relievers  Please follow up with your PCP within a week of discharge.      SECONDARY DISCHARGE DIAGNOSES  Diagnosis: Acute encephalopathy  Assessment and Plan of Treatment: You were found to be confused and with decreased alertness when you were brought to the hospital. After treatment in the emergency department with fluids, you became more alert and oriented. Prior to discharge from the hospital, you were back to your normal baseline mental status.    Diagnosis: HTN (hypertension)  Assessment and Plan of Treatment: You have a history of Hypertension.  On this admission, your Blood Pressure was adequately controlled in the hospital with your home medications Metoprolol and Enalapril. Please continue taking these medications at home as prescribed.  Your blood pressure target is 120-140/80-90, maintain healthy lifestyle, low salt diet, avoid fatty food, weight loss, exercise regularly or stay active as tolerated 30 mins X 3 times per week.  Notify your doctor if you have any of the following symptoms:   (Dizziness, Lightheadedness, Blurry vision, Headache, Chest pain, Shortness of breath.)  Please follow-up with your PCP in 1 week from discharge to adjust medications as needed.    Diagnosis: DM (diabetes mellitus)  Assessment and Plan of Treatment: You have a history of diabetes.   Your HbA1c was 7.4 during this admission. Your diabetes was managed in the hospital with insulin sliding scale injections.  You need to continue monitoring your blood sugar levels closely and maintain healthy lifestyle by eating healthy diabetic regimen, weight loss and exercise regularly as tolerated.  Please continue to take your home diabetes medications as prescribed.   Please follow up with your PCP/Endocrinologist within a week of discharge.    Diagnosis: HLD (hyperlipidemia)  Assessment and Plan of Treatment: You have history of Hyperlipidemia. You were continued on your home statin medication while in the hospital. Please continue to take your medication at home as prescribed. Maintain healthy lifestyle, low fat diet, exercise regularly and check your lipid levels routinely.   Please follow up with your PCP in 1 week from discharge.    Diagnosis: Abdominal pain, vomiting, and diarrhea  Assessment and Plan of Treatment: You came to the hospital with abdominal pain, vomiting, and diarrhea. You were given 1 liter of fluid in the emergency department. You were given a total of 2.5L of IV fluids for rehydration, tylenol as needed for pain. Prior for discharge from the hospital your vomiting and diarrhea had resolved, and your abdominal pain was managable with oral medications available over-the-counter. Please only take TYLENOL for pain in the future and AVOID NSAID pain medications.     PRINCIPAL DISCHARGE DIAGNOSIS  Diagnosis: Acute upper GI bleed  Assessment and Plan of Treatment: You were diagnosed with Gastrointestinal bleed. Your hemoglobin dropped from 10.1 to 6.8.   You were treated with IV Protonix, blood transfusion and close monitoring of your blood counts. You were given a total of 3 units of blood and your hemoglobin remained stable at around 10-11. You had an endoscopy done which showed no active bleeding, but the prescence of two duodenal ulcers.  Please START taking:  PANTOPRAZOLE (PROTONIX) 40mg one tablet TWICE PER DAY  .  .  Please STOP taking:  MELOXCIAM  NEXIUM  any NSAID medications including pain relievers  You may resume your normal diet as tolerated. Avoid alcohol. Please follow up with your PCP within a week of discharge.      SECONDARY DISCHARGE DIAGNOSES  Diagnosis: Acute encephalopathy  Assessment and Plan of Treatment: You were found to be confused and with decreased alertness when you were brought to the hospital. After treatment in the emergency department with fluids, you became more alert and oriented. Prior to discharge from the hospital, you were back to your normal baseline mental status.    Diagnosis: HTN (hypertension)  Assessment and Plan of Treatment: You have a history of Hypertension.  On this admission, your Blood Pressure was adequately controlled in the hospital with your home medications Metoprolol and Enalapril. Please continue taking these medications at home as prescribed.  Your blood pressure target is 120-140/80-90, maintain healthy lifestyle, low salt diet, avoid fatty food, weight loss, exercise regularly or stay active as tolerated 30 mins X 3 times per week.  Notify your doctor if you have any of the following symptoms:   (Dizziness, Lightheadedness, Blurry vision, Headache, Chest pain, Shortness of breath.)  Please follow-up with your PCP in 1 week from discharge to adjust medications as needed.    Diagnosis: DM (diabetes mellitus)  Assessment and Plan of Treatment: You have a history of diabetes.   Your HbA1c was 7.4 during this admission. Your diabetes was managed in the hospital with insulin sliding scale injections.  You need to continue monitoring your blood sugar levels closely and maintain healthy lifestyle by eating healthy diabetic regimen, weight loss and exercise regularly as tolerated.  Please continue to take your home diabetes medications as prescribed.   Please follow up with your PCP/Endocrinologist within a week of discharge.    Diagnosis: HLD (hyperlipidemia)  Assessment and Plan of Treatment: You have history of Hyperlipidemia. You were continued on your home statin medication while in the hospital. Please continue to take your medication at home as prescribed. Maintain healthy lifestyle, low fat diet, exercise regularly and check your lipid levels routinely.   Please follow up with your PCP in 1 week from discharge.    Diagnosis: Abdominal pain, vomiting, and diarrhea  Assessment and Plan of Treatment: You came to the hospital with abdominal pain, vomiting, and diarrhea. You were given 1 liter of fluid in the emergency department. You were given a total of 2.5L of IV fluids for rehydration, tylenol as needed for pain. Prior for discharge from the hospital your vomiting and diarrhea had resolved, and your abdominal pain was managable with oral medications available over-the-counter. Please only take TYLENOL for pain in the future and AVOID NSAID pain medications.

## 2024-11-22 NOTE — PROGRESS NOTE ADULT - SUBJECTIVE AND OBJECTIVE BOX
SUBJECTIVE: **TO UPDATE**  No overnight events. Pt seen at bedside, states that they feel "__________." Pt endorses _____. Pt denies headache, fever, chills, SOB, chest pain, cough, abd pain, n/v/d, constipation, dysuria, urinary frequency, back pain, myalgias, weakness, dizziness, gait disturbance, numbness/tingling, blurry vision.      OBJECTIVE:    T(C): 37.8 (11-22-24 @ 04:21), Max: 37.8 (11-22-24 @ 04:21)  HR: 94 (11-22-24 @ 04:21) (71 - 101)  BP: 147/77 (11-22-24 @ 04:21) (124/57 - 177/94)  RR: 17 (11-22-24 @ 04:21) (12 - 22)  SpO2: 95% (11-22-24 @ 04:21) (94% - 99%)        11-21-24 @ 07:01  -  11-22-24 @ 07:00  --------------------------------------------------------  IN: 0 mL / OUT: 750 mL / NET: -750 mL          ***TO BE EDITED***  CONSTITUTIONAL: No apparent distress, resting comfortably  EYES: Pupils symmetric, EOMI, No conjunctival or scleral injection, non-icteric  ENMT: Oral mucosa with moist membranes  RESP: No respiratory distress, no use of accessory muscles; CTA b/l, no crackles or wheezes  CV: RRR, +S1S2, no murmurs appreciated; no peripheral edema  GI: Soft, NTND, no rebound, no guarding  MSK: No digital clubbing or cyanosis; Extremities moving equally without additional effort; gait not appreciated  : deferred  SKIN: No rashes or ulcers noted  NEURO: CN II-XII grossly intact   PSYCH: Appropriate insight/judgment; A+O x 3, mood and affect appropriate, recent/remote memory intact    No Known Allergies      insulin lispro (ADMELOG) corrective regimen sliding scale   SubCutaneous three times a day before meals  insulin lispro (ADMELOG) corrective regimen sliding scale   SubCutaneous at bedtime  pantoprazole    Tablet 40 milliGRAM(s) Oral two times a day      31.8                       SUBJECTIVE: No overnight events. Pt seen at bedside with family who translated. Pt states that they feel "fine." Pt endorses mild abd pain in epigastric and suprapubic regions. Pt denies headache, fever, chills, SOB, chest pain, n/v/d.      OBJECTIVE:    T(C): 37.8 (11-22-24 @ 04:21), Max: 37.8 (11-22-24 @ 04:21)  HR: 94 (11-22-24 @ 04:21) (71 - 101)  BP: 147/77 (11-22-24 @ 04:21) (124/57 - 177/94)  RR: 17 (11-22-24 @ 04:21) (12 - 22)  SpO2: 95% (11-22-24 @ 04:21) (94% - 99%)        11-21-24 @ 07:01  -  11-22-24 @ 07:00  --------------------------------------------------------  IN: 0 mL / OUT: 750 mL / NET: -750 mL          CONSTITUTIONAL: No apparent distress, resting comfortably  EYES: Pupils symmetric, EOMI, No conjunctival or scleral injection, non-icteric  ENMT: Oral mucosa with moist membranes  RESP: No respiratory distress, no use of accessory muscles; CTA b/l, no crackles or wheezes  CV: RRR, +S1S2, no murmurs appreciated; no peripheral edema  GI: Soft, NTND, no rebound, no guarding  MSK: No digital clubbing or cyanosis; Extremities moving equally without additional effort; gait not appreciated  : deferred  SKIN: No rashes or ulcers noted  NEURO: CN II-XII grossly intact, no focal deficits        No Known Allergies      insulin lispro (ADMELOG) corrective regimen sliding scale   SubCutaneous three times a day before meals  insulin lispro (ADMELOG) corrective regimen sliding scale   SubCutaneous at bedtime  pantoprazole    Tablet 40 milliGRAM(s) Oral two times a day                            11.0   11.26 )-----------( 268      ( 22 Nov 2024 03:51 )             31.8     11-22    143  |  111[H]  |  22[H]  ----------------------------<  170[H]  3.6   |  25  |  0.55    Ca    9.0      22 Nov 2024 03:51  Phos  2.6     11-22  Mg     1.7     11-22

## 2024-11-22 NOTE — PROGRESS NOTE ADULT - PROBLEM SELECTOR PLAN 3
Controlled  Takes Enalapril at home  Continue BB with parameters  Monitor BP  DASH diet
Patient with history of HTN  Patient on Enalapril and Metoprolol at home  - HOLD antihypertensives for now given GIB

## 2024-11-22 NOTE — DISCHARGE NOTE PROVIDER - CARE PROVIDER_API CALL
Stefan Leone  Internal Medicine  [id number] [place] [address], [address]  [place] [address]  Phone: [phone]  Fax: [phone]  Established Patient  Follow Up Time: 1 week

## 2024-11-22 NOTE — DISCHARGE NOTE PROVIDER - HOSPITAL COURSE
84 F, living w/ son at home, ambulating w/ a cane, w/ Dementia (AAOx2-3), w/ PMHx of DM, HTN, and HLD BIBEMS due to AMS, fatigue, decreased appetite, and watery stools.  Admitted to medicine for SIRS.     Melena 11/19 > CBC showing drop of Hb from 10 to 6. 1U PRBC given and Hb improved to 8s. Patient again had further episodes of melena on 11/20. GI was consulted, pt went for EGD and was found to have 2 large non bleeding ulcers.  Patient was transfused 2U PRBC s/p egd. ICU was consulted for close monitoring in GI bleeding post egd. Pt was started on a PPI infusion and then was transitioned to po PPI BID. total of 3 units of PRBC given. Hb lateralized to 10-11, no bleeding since post egd.   Pt was downgraded to medical floor 11/22, Hb stable at 11. Diet advanced as tolerated, no episodes of bleeding. Abd pain persistent but mild, no analgesia required.    Patient is stable for discharge per attending and is advised to follow up with PCP as outpatient  Please refer to patient's complete medical chart with documents for a full hospital course, for this is only a brief summary. 84 F, living w/ son at home, ambulating w/ a cane, w/ Dementia (AAOx2-3), w/ PMHx of DM, HTN, and HLD BIBEMS due to AMS, fatigue, decreased appetite, and watery stools.  Admitted to medicine for SIRS.     Pt without infectious source.   Melena 11/19 > CBC showing drop of Hb from 10 to 6. 1U PRBC given and Hb improved to 8s. Patient again had further episodes of melena on 11/20. GI was consulted, pt went for EGD and was found to have 2 large non bleeding ulcers.  Patient was transfused 2U PRBC s/p egd. ICU was consulted for close monitoring in GI bleeding post egd. Pt was started on a PPI infusion and then was transitioned to po PPI BID. total of 3 units of PRBC given. Hb lateralized to 10-11, no bleeding since post egd.   Pt was downgraded to medical floor 11/22, Hb stable at 11. Diet advanced as tolerated, no episodes of bleeding. Abd pain persistent but mild, no analgesia required.    Patient is stable for discharge per attending and is advised to follow up with PCP as outpatient  Please refer to patient's complete medical chart with documents for a full hospital course, for this is only a brief summary. 84 F, living w/ son at home, ambulating w/ a cane, w/ Dementia (AAOx2-3), w/ PMHx of DM, HTN, and HLD BIBEMS due to AMS, fatigue, decreased appetite, and watery stools.  Admitted to medicine for SIRS.     Pt without infectious source.   Melena 11/19 > CBC showing drop of Hb from 10 to 6. 1U PRBC given and Hb improved to 8s. Patient again had further episodes of melena on 11/20. GI was consulted, pt went for EGD and was found to have 2 large non bleeding ulcers.  Patient was transfused 2U PRBC s/p egd. ICU was consulted for close monitoring in GI bleeding post egd. Pt was started on a PPI infusion and then was transitioned to po PPI BID. total of 3 units of PRBC given. Hb lateralized to 10-11, no bleeding since post egd.   Pt was downgraded to medical floor 11/22, Hb stable at 11. Diet advanced as tolerated, no episodes of bleeding. Abd pain persistent but mild, no analgesia required. Pt was discharged with instructions to d/c NSAID pain medications and with prescription for protonix 40mg BID.    Patient is stable for discharge per attending and is advised to follow up with PCP as outpatient  Please refer to patient's complete medical chart with documents for a full hospital course, for this is only a brief summary.

## 2024-11-22 NOTE — PROGRESS NOTE ADULT - PROBLEM SELECTOR PLAN 2
- S/P EGD showing 2 Large non bleeding ulcers in the stomach and no signs of activie bleeding in the duodeum  - CT Showing duodenitis  - PPI infusion  - Transfuse as needed to maintain Hb > 7  - Maintain 2 large bore IVs  - Per GI EGD note if patient has epsiode of rebleeding consult IR and / or surgery as no endoscopic options  - GI Consulted Dr. Galdamez S/P EGD showing 2 Large non bleeding ulcers in the stomach and no signs of activie bleeding in the duodeum  CT Showing duodenitis  PPI infusion  GI Consulted Dr. Galdamez  -Transfuse as needed to maintain Hb > 7  -Maintain 2 large bore IVs  - Per GI EGD note if patient has epsiode of rebleeding consult IR and / or surgery as no endoscopic options

## 2024-11-22 NOTE — PROGRESS NOTE ADULT - PROVIDER SPECIALTY LIST ADULT
Critical Care
Internal Medicine
Infectious Disease
Internal Medicine
Gastroenterology

## 2024-11-22 NOTE — PROGRESS NOTE ADULT - NUTRITIONAL ASSESSMENT
Diet, Regular:   Consistent Carbohydrate {No Snacks}  DASH/TLC {Sodium & Cholesterol Restricted}  Alexa (11-22-24 @ 13:55) [Active]

## 2024-11-22 NOTE — PROGRESS NOTE ADULT - ASSESSMENT
Patient is An 84 year old female, from home, lives with son, ambulating with a cane, with PMH of HTN, HLD, DM, Dementia (A&Ox2 baseline), who presented to the ED with AMS. Patient now at baseline mentation. Initially admitted to medicine for sepsis. Patient had an episode on Melena on 11/19 and than CBC showing drow of Hb from 10 to 6.  1U PRBC given and Hb improved to 8s.  Patient again had further episodes of melena on 11/20, went for EGD and was found to have 2 large non bleeding ulcers.  Patient was transfused 1U PRBC and ordered for a second unit.  ICU was consulted for close monitoring in GI bleeding.          _________CNS___________  # mentation  # Dementia  - Baseline A&Ox2  - Currently at baseline mentation      _________CVS___________  # HTN  - Patient with history of HTN  - Patient on Enalapril and Metoprolol at home  - HOLD antihypertensives for now given GIB    - Patient with history of HLD  - Patient on Statin at home  - Continue home antihyperlipidemic     ___________GI____________  # Gastrointestinal bleeding  # 2 Large gastric ulcers  # Melena   # Duodenitis   Patient is An 84 year old female, from home, lives with son, ambulating with a cane, with PMH of HTN, HLD, DM, Dementia (A&Ox2 baseline), who presented to the ED with AMS. Patient now at baseline mentation. Initially admitted to medicine for sepsis. Patient had an episode on Melena on 11/19 and than CBC showing drow of Hb from 10 to 6.  1U PRBC given and Hb improved to 8s.  Patient again had further episodes of melena on 11/20, went for EGD and was found to have 2 large non bleeding ulcers.  Patient was transfused 1U PRBC and ordered for a second unit.  ICU was consulted for close monitoring in GI bleeding. Stepped down to floor when Hb remained stable.

## 2024-11-22 NOTE — PROGRESS NOTE ADULT - PROBLEM SELECTOR PLAN 5
Continue with home dose statin
Patient with history of HLD  Patient on Statin at home  - Continue home antihyperlipidemic

## 2024-11-22 NOTE — DISCHARGE NOTE PROVIDER - NSDCMRMEDTOKEN_GEN_ALL_CORE_FT
atorvastatin 10 mg oral tablet: 1 tab(s) orally once a day (at bedtime)  enalapril 2.5 mg oral tablet: 1 tab(s) orally once a day  gabapentin 300 mg oral tablet: 1 tab(s) orally once a day  meloxicam 15 mg oral tablet: 1 tab(s) orally once a day  metFORMIN 1000 mg oral tablet, extended release: 1 tab(s) orally once a day  metoprolol tartrate 50 mg oral tablet: 1 tab(s) orally 2 times a day  Mounjaro 5 mg/0.5 mL subcutaneous solution: 5 milligram(s) subcutaneously every 7 days  NexIUM 40 mg oral delayed release capsule: 1 cap(s) orally once a day  Tresiba FlexTouch 100 units/mL subcutaneous solution: 15 milliliter(s) subcutaneous once a day   atorvastatin 10 mg oral tablet: 1 tab(s) orally once a day (at bedtime)  enalapril 2.5 mg oral tablet: 1 tab(s) orally once a day  gabapentin 300 mg oral tablet: 1 tab(s) orally once a day  metFORMIN 1000 mg oral tablet, extended release: 1 tab(s) orally once a day  metoprolol tartrate 50 mg oral tablet: 1 tab(s) orally 2 times a day  Mounjaro 5 mg/0.5 mL subcutaneous solution: 5 milligram(s) subcutaneously every 7 days  Tresiba FlexTouch 100 units/mL subcutaneous solution: 15 milliliter(s) subcutaneous once a day   atorvastatin 10 mg oral tablet: 1 tab(s) orally once a day (at bedtime)  enalapril 2.5 mg oral tablet: 1 tab(s) orally once a day  gabapentin 300 mg oral tablet: 1 tab(s) orally once a day  metFORMIN 1000 mg oral tablet, extended release: 1 tab(s) orally once a day  metoprolol tartrate 50 mg oral tablet: 1 tab(s) orally 2 times a day  Mounjaro 5 mg/0.5 mL subcutaneous solution: 5 milligram(s) subcutaneously every 7 days  pantoprazole 40 mg oral delayed release tablet: 1 tab(s) orally 2 times a day  Tresiba FlexTouch 100 units/mL subcutaneous solution: 15 milliliter(s) subcutaneous once a day

## 2024-11-22 NOTE — DISCHARGE NOTE PROVIDER - NSDCCAREPROVSEEN_GEN_ALL_CORE_FT
Simon, Royal Borja, Filipe Gutierrez, Yuliet Waller, Starr Parrish, Romulo Huddleston, Gonzalo Anderson, Lalit Torers, Marjan Leone, Stefan Joseph, Kamlesh Mays, Anthony

## 2024-11-22 NOTE — PROGRESS NOTE ADULT - PROBLEM SELECTOR PLAN 1
Patients Hb on presentation 10.1  6.8 on 11/19  Likely 2/2 GIB given episodes of melena   S/P 1U PRBC on 11/19  S/P EGD showing 2 Large non bleeding ulcers in the stomach and no signs of activie bleeding in the duodeum  CT Showing duodenitis  S/P 2U PRBC on 11/20 s/p EGD  Total PRBC transfused this admission: 3U as of 11/20    - F/U Post transfusion CBC  - Trend CBC q6h Patients Hb on presentation 10.1  6.8 on 11/19  Likely 2/2 GIB given episodes of melena   S/P 1U PRBC on 11/19  S/P EGD showing 2 Large non bleeding ulcers in the stomach and no signs of activie bleeding in the duodeum  CT Showing duodenitis  S/P 2U PRBC on 11/20 s/p EGD  Total PRBC transfused this admission: 3U as of 11/20    Hb stable for 48hrs  -monitor Hb daily  -diet advanced after EGD  -PPI BID

## 2024-11-23 VITALS
DIASTOLIC BLOOD PRESSURE: 74 MMHG | HEART RATE: 91 BPM | OXYGEN SATURATION: 94 % | RESPIRATION RATE: 18 BRPM | TEMPERATURE: 98 F | SYSTOLIC BLOOD PRESSURE: 112 MMHG

## 2024-11-23 LAB
ANION GAP SERPL CALC-SCNC: 9 MMOL/L — SIGNIFICANT CHANGE UP (ref 5–17)
BUN SERPL-MCNC: 16 MG/DL — SIGNIFICANT CHANGE UP (ref 7–18)
CALCIUM SERPL-MCNC: 9.2 MG/DL — SIGNIFICANT CHANGE UP (ref 8.4–10.5)
CHLORIDE SERPL-SCNC: 104 MMOL/L — SIGNIFICANT CHANGE UP (ref 96–108)
CO2 SERPL-SCNC: 24 MMOL/L — SIGNIFICANT CHANGE UP (ref 22–31)
CREAT SERPL-MCNC: 0.49 MG/DL — LOW (ref 0.5–1.3)
CULTURE RESULTS: SIGNIFICANT CHANGE UP
CULTURE RESULTS: SIGNIFICANT CHANGE UP
EGFR: 92 ML/MIN/1.73M2 — SIGNIFICANT CHANGE UP
GLUCOSE BLDC GLUCOMTR-MCNC: 215 MG/DL — HIGH (ref 70–99)
GLUCOSE SERPL-MCNC: 222 MG/DL — HIGH (ref 70–99)
HCT VFR BLD CALC: 33.5 % — LOW (ref 34.5–45)
HGB BLD-MCNC: 11.7 G/DL — SIGNIFICANT CHANGE UP (ref 11.5–15.5)
MAGNESIUM SERPL-MCNC: 1.7 MG/DL — SIGNIFICANT CHANGE UP (ref 1.6–2.6)
MCHC RBC-ENTMCNC: 30.5 PG — SIGNIFICANT CHANGE UP (ref 27–34)
MCHC RBC-ENTMCNC: 34.9 G/DL — SIGNIFICANT CHANGE UP (ref 32–36)
MCV RBC AUTO: 87.2 FL — SIGNIFICANT CHANGE UP (ref 80–100)
NRBC # BLD: 0 /100 WBCS — SIGNIFICANT CHANGE UP (ref 0–0)
PHOSPHATE SERPL-MCNC: 2.5 MG/DL — SIGNIFICANT CHANGE UP (ref 2.5–4.5)
PLATELET # BLD AUTO: 324 K/UL — SIGNIFICANT CHANGE UP (ref 150–400)
POTASSIUM SERPL-MCNC: 3.5 MMOL/L — SIGNIFICANT CHANGE UP (ref 3.5–5.3)
POTASSIUM SERPL-SCNC: 3.5 MMOL/L — SIGNIFICANT CHANGE UP (ref 3.5–5.3)
RBC # BLD: 3.84 M/UL — SIGNIFICANT CHANGE UP (ref 3.8–5.2)
RBC # FLD: 14.3 % — SIGNIFICANT CHANGE UP (ref 10.3–14.5)
SODIUM SERPL-SCNC: 137 MMOL/L — SIGNIFICANT CHANGE UP (ref 135–145)
SPECIMEN SOURCE: SIGNIFICANT CHANGE UP
SPECIMEN SOURCE: SIGNIFICANT CHANGE UP
WBC # BLD: 11.57 K/UL — HIGH (ref 3.8–10.5)
WBC # FLD AUTO: 11.57 K/UL — HIGH (ref 3.8–10.5)

## 2024-11-23 PROCEDURE — 86900 BLOOD TYPING SEROLOGIC ABO: CPT

## 2024-11-23 PROCEDURE — 87641 MR-STAPH DNA AMP PROBE: CPT

## 2024-11-23 PROCEDURE — 83690 ASSAY OF LIPASE: CPT

## 2024-11-23 PROCEDURE — 84100 ASSAY OF PHOSPHORUS: CPT

## 2024-11-23 PROCEDURE — 80053 COMPREHEN METABOLIC PANEL: CPT

## 2024-11-23 PROCEDURE — 83605 ASSAY OF LACTIC ACID: CPT

## 2024-11-23 PROCEDURE — 85025 COMPLETE CBC W/AUTO DIFF WBC: CPT

## 2024-11-23 PROCEDURE — 83735 ASSAY OF MAGNESIUM: CPT

## 2024-11-23 PROCEDURE — 99285 EMERGENCY DEPT VISIT HI MDM: CPT | Mod: 25

## 2024-11-23 PROCEDURE — 84145 PROCALCITONIN (PCT): CPT

## 2024-11-23 PROCEDURE — 87637 SARSCOV2&INF A&B&RSV AMP PRB: CPT

## 2024-11-23 PROCEDURE — 85730 THROMBOPLASTIN TIME PARTIAL: CPT

## 2024-11-23 PROCEDURE — 82962 GLUCOSE BLOOD TEST: CPT

## 2024-11-23 PROCEDURE — 80048 BASIC METABOLIC PNL TOTAL CA: CPT

## 2024-11-23 PROCEDURE — 85610 PROTHROMBIN TIME: CPT

## 2024-11-23 PROCEDURE — 71045 X-RAY EXAM CHEST 1 VIEW: CPT

## 2024-11-23 PROCEDURE — 85027 COMPLETE CBC AUTOMATED: CPT

## 2024-11-23 PROCEDURE — 74177 CT ABD & PELVIS W/CONTRAST: CPT | Mod: MC

## 2024-11-23 PROCEDURE — P9040: CPT

## 2024-11-23 PROCEDURE — 81001 URINALYSIS AUTO W/SCOPE: CPT

## 2024-11-23 PROCEDURE — 87640 STAPH A DNA AMP PROBE: CPT

## 2024-11-23 PROCEDURE — 97162 PT EVAL MOD COMPLEX 30 MIN: CPT

## 2024-11-23 PROCEDURE — 86923 COMPATIBILITY TEST ELECTRIC: CPT

## 2024-11-23 PROCEDURE — 36430 TRANSFUSION BLD/BLD COMPNT: CPT

## 2024-11-23 PROCEDURE — 86901 BLOOD TYPING SEROLOGIC RH(D): CPT

## 2024-11-23 PROCEDURE — 86850 RBC ANTIBODY SCREEN: CPT

## 2024-11-23 PROCEDURE — 83036 HEMOGLOBIN GLYCOSYLATED A1C: CPT

## 2024-11-23 PROCEDURE — 84443 ASSAY THYROID STIM HORMONE: CPT

## 2024-11-23 PROCEDURE — 93005 ELECTROCARDIOGRAM TRACING: CPT

## 2024-11-23 PROCEDURE — 70450 CT HEAD/BRAIN W/O DYE: CPT | Mod: MC

## 2024-11-23 PROCEDURE — 87040 BLOOD CULTURE FOR BACTERIA: CPT

## 2024-11-23 PROCEDURE — 36415 COLL VENOUS BLD VENIPUNCTURE: CPT

## 2024-11-23 RX ORDER — PANTOPRAZOLE SODIUM 40 MG/1
1 TABLET, DELAYED RELEASE ORAL
Qty: 60 | Refills: 0
Start: 2024-11-23 | End: 2024-12-22

## 2024-11-23 RX ORDER — ESOMEPRAZOLE MAGNESIUM 20 MG/1
1 CAPSULE, DELAYED RELEASE ORAL
Refills: 0 | DISCHARGE

## 2024-11-23 RX ORDER — HALOPERIDOL 2 MG
1 TABLET ORAL ONCE
Refills: 0 | Status: COMPLETED | OUTPATIENT
Start: 2024-11-23 | End: 2024-11-23

## 2024-11-23 RX ORDER — MELOXICAM 7.5 MG/1
1 TABLET ORAL
Refills: 0 | DISCHARGE

## 2024-11-23 RX ADMIN — METOPROLOL TARTRATE 50 MILLIGRAM(S): 100 TABLET, FILM COATED ORAL at 05:37

## 2024-11-23 RX ADMIN — Medication 2: at 08:07

## 2024-11-23 RX ADMIN — PANTOPRAZOLE SODIUM 40 MILLIGRAM(S): 40 TABLET, DELAYED RELEASE ORAL at 05:37

## 2024-11-23 RX ADMIN — ENALAPRIL MALEATE 2.5 MILLIGRAM(S): 2.5 TABLET ORAL at 05:38

## 2024-11-23 RX ADMIN — Medication 1 MILLIGRAM(S): at 00:05

## 2024-11-23 RX ADMIN — Medication 1 MILLIGRAM(S): at 01:43

## 2024-11-23 NOTE — DISCHARGE NOTE NURSING/CASE MANAGEMENT/SOCIAL WORK - NSDCPEFALRISK_GEN_ALL_CORE
For information on Fall & Injury Prevention, visit: [email or web address] OR  [email or web address] OR  https://www.cdc.gov/steadi/patient.html

## 2024-11-23 NOTE — DISCHARGE NOTE NURSING/CASE MANAGEMENT/SOCIAL WORK - FINANCIAL ASSISTANCE
[facility] provides services at a reduced cost to those who are determined to be eligible through [facility]’s financial assistance program. Information regarding [facility]’s financial assistance program can be found by going to [email or web address] or by calling [phone].

## 2024-11-23 NOTE — DISCHARGE NOTE NURSING/CASE MANAGEMENT/SOCIAL WORK - PATIENT PORTAL LINK FT
You can access the FollowMyHealth Patient Portal offered by [facility] by registering at the following website: [email or web address]. By joining [facility]’s FollowMyHealth portal, you will also be able to view your health information using other applications (apps) compatible with our system.

## 2024-11-24 ENCOUNTER — INPATIENT (INPATIENT)
Facility: HOSPITAL | Age: 84
LOS: 7 days | Discharge: NURSING, REHAB OR LONG-TERM CARE | DRG: 872 | End: 2024-12-02
Attending: INTERNAL MEDICINE | Admitting: INTERNAL MEDICINE
Payer: MEDICARE

## 2024-11-24 VITALS
WEIGHT: 119.71 LBS | DIASTOLIC BLOOD PRESSURE: 85 MMHG | TEMPERATURE: 101 F | HEIGHT: 59 IN | SYSTOLIC BLOOD PRESSURE: 130 MMHG | HEART RATE: 121 BPM | OXYGEN SATURATION: 93 % | RESPIRATION RATE: 20 BRPM

## 2024-11-24 LAB
ALBUMIN SERPL ELPH-MCNC: 2.6 G/DL — LOW (ref 3.5–5)
ALP SERPL-CCNC: 79 U/L — SIGNIFICANT CHANGE UP (ref 40–120)
ALT FLD-CCNC: 86 U/L DA — HIGH (ref 10–60)
ANION GAP SERPL CALC-SCNC: 6 MMOL/L — SIGNIFICANT CHANGE UP (ref 5–17)
APTT BLD: 25.6 SEC — SIGNIFICANT CHANGE UP (ref 24.5–35.6)
AST SERPL-CCNC: 25 U/L — SIGNIFICANT CHANGE UP (ref 10–40)
BASOPHILS # BLD AUTO: 0.03 K/UL — SIGNIFICANT CHANGE UP (ref 0–0.2)
BASOPHILS NFR BLD AUTO: 0.2 % — SIGNIFICANT CHANGE UP (ref 0–2)
BILIRUB SERPL-MCNC: 0.4 MG/DL — SIGNIFICANT CHANGE UP (ref 0.2–1.2)
BLD GP AB SCN SERPL QL: SIGNIFICANT CHANGE UP
BUN SERPL-MCNC: 25 MG/DL — HIGH (ref 7–18)
CALCIUM SERPL-MCNC: 9.7 MG/DL — SIGNIFICANT CHANGE UP (ref 8.4–10.5)
CHLORIDE SERPL-SCNC: 104 MMOL/L — SIGNIFICANT CHANGE UP (ref 96–108)
CO2 SERPL-SCNC: 28 MMOL/L — SIGNIFICANT CHANGE UP (ref 22–31)
CREAT SERPL-MCNC: 0.75 MG/DL — SIGNIFICANT CHANGE UP (ref 0.5–1.3)
EGFR: 77 ML/MIN/1.73M2 — SIGNIFICANT CHANGE UP
EOSINOPHIL # BLD AUTO: 0.11 K/UL — SIGNIFICANT CHANGE UP (ref 0–0.5)
EOSINOPHIL NFR BLD AUTO: 0.7 % — SIGNIFICANT CHANGE UP (ref 0–6)
FLUAV AG NPH QL: SIGNIFICANT CHANGE UP
FLUBV AG NPH QL: SIGNIFICANT CHANGE UP
GLUCOSE SERPL-MCNC: 318 MG/DL — HIGH (ref 70–99)
HCT VFR BLD CALC: 34.1 % — LOW (ref 34.5–45)
HGB BLD-MCNC: 11.6 G/DL — SIGNIFICANT CHANGE UP (ref 11.5–15.5)
IMM GRANULOCYTES NFR BLD AUTO: 0.5 % — SIGNIFICANT CHANGE UP (ref 0–0.9)
INR BLD: 1.47 RATIO — HIGH (ref 0.85–1.16)
LACTATE SERPL-SCNC: 1.2 MMOL/L — SIGNIFICANT CHANGE UP (ref 0.7–2)
LYMPHOCYTES # BLD AUTO: 1.12 K/UL — SIGNIFICANT CHANGE UP (ref 1–3.3)
LYMPHOCYTES # BLD AUTO: 7.6 % — LOW (ref 13–44)
MCHC RBC-ENTMCNC: 30.4 PG — SIGNIFICANT CHANGE UP (ref 27–34)
MCHC RBC-ENTMCNC: 34 G/DL — SIGNIFICANT CHANGE UP (ref 32–36)
MCV RBC AUTO: 89.5 FL — SIGNIFICANT CHANGE UP (ref 80–100)
MONOCYTES # BLD AUTO: 1.31 K/UL — HIGH (ref 0–0.9)
MONOCYTES NFR BLD AUTO: 8.8 % — SIGNIFICANT CHANGE UP (ref 2–14)
NEUTROPHILS # BLD AUTO: 12.18 K/UL — HIGH (ref 1.8–7.4)
NEUTROPHILS NFR BLD AUTO: 82.2 % — HIGH (ref 43–77)
NRBC # BLD: 0 /100 WBCS — SIGNIFICANT CHANGE UP (ref 0–0)
PLATELET # BLD AUTO: 370 K/UL — SIGNIFICANT CHANGE UP (ref 150–400)
POTASSIUM SERPL-MCNC: 3.5 MMOL/L — SIGNIFICANT CHANGE UP (ref 3.5–5.3)
POTASSIUM SERPL-SCNC: 3.5 MMOL/L — SIGNIFICANT CHANGE UP (ref 3.5–5.3)
PROT SERPL-MCNC: 7 G/DL — SIGNIFICANT CHANGE UP (ref 6–8.3)
PROTHROM AB SERPL-ACNC: 17.1 SEC — HIGH (ref 9.9–13.4)
RBC # BLD: 3.81 M/UL — SIGNIFICANT CHANGE UP (ref 3.8–5.2)
RBC # FLD: 14.3 % — SIGNIFICANT CHANGE UP (ref 10.3–14.5)
RSV RNA NPH QL NAA+NON-PROBE: SIGNIFICANT CHANGE UP
SARS-COV-2 RNA SPEC QL NAA+PROBE: SIGNIFICANT CHANGE UP
SODIUM SERPL-SCNC: 138 MMOL/L — SIGNIFICANT CHANGE UP (ref 135–145)
WBC # BLD: 14.83 K/UL — HIGH (ref 3.8–10.5)
WBC # FLD AUTO: 14.83 K/UL — HIGH (ref 3.8–10.5)

## 2024-11-24 PROCEDURE — 71045 X-RAY EXAM CHEST 1 VIEW: CPT | Mod: 26

## 2024-11-24 PROCEDURE — 99285 EMERGENCY DEPT VISIT HI MDM: CPT

## 2024-11-24 RX ORDER — PANTOPRAZOLE SODIUM 40 MG/1
40 TABLET, DELAYED RELEASE ORAL ONCE
Refills: 0 | Status: COMPLETED | OUTPATIENT
Start: 2024-11-24 | End: 2024-11-24

## 2024-11-24 RX ORDER — ACETAMINOPHEN 500MG 500 MG/1
1000 TABLET, COATED ORAL ONCE
Refills: 0 | Status: COMPLETED | OUTPATIENT
Start: 2024-11-24 | End: 2024-11-24

## 2024-11-24 RX ADMIN — PANTOPRAZOLE SODIUM 40 MILLIGRAM(S): 40 TABLET, DELAYED RELEASE ORAL at 22:07

## 2024-11-24 NOTE — ED PROVIDER NOTE - OBJECTIVE STATEMENT
84-year-old female with history of dementia, diabetes, hypertension, hyperlipidemia with recent admission for infection and noted to have GI bleed presents with 2 episodes of dark stools at home after discharge. 84-year-old female with history of dementia, diabetes, hypertension, hyperlipidemia with recent admission for infection and noted to have GI bleed presents with 2 episodes of dark stools at home after discharge from a recent admission for stool, GI bleed and anemia.

## 2024-11-24 NOTE — ED PROVIDER NOTE - CLINICAL SUMMARY MEDICAL DECISION MAKING FREE TEXT BOX
84-year-old female with recent admission for GI bleed, anemia as well as fever  of unknown etiology who was discharged yesterday and returns with 2 episodes of dark/tarry stools and fever of 101 on arrival.  Tachycardic.  Code sepsis on arrival.   Sepsis workup.  Did not administer antibiotics and fluids as patient had recent admission.   her lactate is normal and she was not hypotensive during her stay in the emergency department. Given Protonix, Tylenol.   Will admit for further evaluation, observation.

## 2024-11-24 NOTE — ED ADULT NURSE NOTE - OBJECTIVE STATEMENT
pt c/o 2 episodes of dark stool today. pt states she was here yesterday for the same thing. pt denies lightheadedness/dizziness. pt denies fever, N/V.

## 2024-11-24 NOTE — ED PROVIDER NOTE - PHYSICAL EXAMINATION
General: Patient well appearing, tachycardic, febrile  HEENT: MMM, trachea midline  Respiratory: No respiratory distress  GI: Soft, nontender  Neuro: Moves all extremities  Skin: No rashes or lesions

## 2024-11-25 DIAGNOSIS — E78.5 HYPERLIPIDEMIA, UNSPECIFIED: ICD-10-CM

## 2024-11-25 DIAGNOSIS — K92.1 MELENA: ICD-10-CM

## 2024-11-25 DIAGNOSIS — Z29.9 ENCOUNTER FOR PROPHYLACTIC MEASURES, UNSPECIFIED: ICD-10-CM

## 2024-11-25 DIAGNOSIS — E11.9 TYPE 2 DIABETES MELLITUS WITHOUT COMPLICATIONS: ICD-10-CM

## 2024-11-25 DIAGNOSIS — A41.9 SEPSIS, UNSPECIFIED ORGANISM: ICD-10-CM

## 2024-11-25 DIAGNOSIS — I10 ESSENTIAL (PRIMARY) HYPERTENSION: ICD-10-CM

## 2024-11-25 LAB
ALBUMIN SERPL ELPH-MCNC: 2.1 G/DL — LOW (ref 3.5–5)
ALP SERPL-CCNC: 68 U/L — SIGNIFICANT CHANGE UP (ref 40–120)
ALT FLD-CCNC: 65 U/L DA — HIGH (ref 10–60)
ANION GAP SERPL CALC-SCNC: 7 MMOL/L — SIGNIFICANT CHANGE UP (ref 5–17)
APPEARANCE UR: CLEAR — SIGNIFICANT CHANGE UP
AST SERPL-CCNC: 25 U/L — SIGNIFICANT CHANGE UP (ref 10–40)
BACTERIA # UR AUTO: ABNORMAL /HPF
BASOPHILS # BLD AUTO: 0.02 K/UL — SIGNIFICANT CHANGE UP (ref 0–0.2)
BASOPHILS NFR BLD AUTO: 0.2 % — SIGNIFICANT CHANGE UP (ref 0–2)
BILIRUB SERPL-MCNC: 0.4 MG/DL — SIGNIFICANT CHANGE UP (ref 0.2–1.2)
BILIRUB UR-MCNC: NEGATIVE — SIGNIFICANT CHANGE UP
BUN SERPL-MCNC: 18 MG/DL — SIGNIFICANT CHANGE UP (ref 7–18)
CALCIUM SERPL-MCNC: 8.6 MG/DL — SIGNIFICANT CHANGE UP (ref 8.4–10.5)
CHLORIDE SERPL-SCNC: 108 MMOL/L — SIGNIFICANT CHANGE UP (ref 96–108)
CO2 SERPL-SCNC: 25 MMOL/L — SIGNIFICANT CHANGE UP (ref 22–31)
COLOR SPEC: YELLOW — SIGNIFICANT CHANGE UP
COMMENT - URINE: SIGNIFICANT CHANGE UP
CREAT SERPL-MCNC: 0.52 MG/DL — SIGNIFICANT CHANGE UP (ref 0.5–1.3)
DIFF PNL FLD: ABNORMAL
EGFR: 90 ML/MIN/1.73M2 — SIGNIFICANT CHANGE UP
EOSINOPHIL # BLD AUTO: 0.15 K/UL — SIGNIFICANT CHANGE UP (ref 0–0.5)
EOSINOPHIL NFR BLD AUTO: 1.5 % — SIGNIFICANT CHANGE UP (ref 0–6)
EPI CELLS # UR: PRESENT
GLUCOSE BLDC GLUCOMTR-MCNC: 206 MG/DL — HIGH (ref 70–99)
GLUCOSE BLDC GLUCOMTR-MCNC: 227 MG/DL — HIGH (ref 70–99)
GLUCOSE BLDC GLUCOMTR-MCNC: 233 MG/DL — HIGH (ref 70–99)
GLUCOSE SERPL-MCNC: 188 MG/DL — HIGH (ref 70–99)
GLUCOSE UR QL: >=1000 MG/DL
HCT VFR BLD CALC: 29.4 % — LOW (ref 34.5–45)
HCT VFR BLD CALC: 29.5 % — LOW (ref 34.5–45)
HGB BLD-MCNC: 10 G/DL — LOW (ref 11.5–15.5)
HGB BLD-MCNC: 9.8 G/DL — LOW (ref 11.5–15.5)
IMM GRANULOCYTES NFR BLD AUTO: 0.5 % — SIGNIFICANT CHANGE UP (ref 0–0.9)
KETONES UR-MCNC: 15 MG/DL
LEUKOCYTE ESTERASE UR-ACNC: NEGATIVE — SIGNIFICANT CHANGE UP
LYMPHOCYTES # BLD AUTO: 1.37 K/UL — SIGNIFICANT CHANGE UP (ref 1–3.3)
LYMPHOCYTES # BLD AUTO: 13.2 % — SIGNIFICANT CHANGE UP (ref 13–44)
MAGNESIUM SERPL-MCNC: 1.5 MG/DL — LOW (ref 1.6–2.6)
MCHC RBC-ENTMCNC: 30.2 PG — SIGNIFICANT CHANGE UP (ref 27–34)
MCHC RBC-ENTMCNC: 30.5 PG — SIGNIFICANT CHANGE UP (ref 27–34)
MCHC RBC-ENTMCNC: 33.2 G/DL — SIGNIFICANT CHANGE UP (ref 32–36)
MCHC RBC-ENTMCNC: 34 G/DL — SIGNIFICANT CHANGE UP (ref 32–36)
MCV RBC AUTO: 89.6 FL — SIGNIFICANT CHANGE UP (ref 80–100)
MCV RBC AUTO: 90.8 FL — SIGNIFICANT CHANGE UP (ref 80–100)
MONOCYTES # BLD AUTO: 1.15 K/UL — HIGH (ref 0–0.9)
MONOCYTES NFR BLD AUTO: 11.1 % — SIGNIFICANT CHANGE UP (ref 2–14)
NEUTROPHILS # BLD AUTO: 7.6 K/UL — HIGH (ref 1.8–7.4)
NEUTROPHILS NFR BLD AUTO: 73.5 % — SIGNIFICANT CHANGE UP (ref 43–77)
NITRITE UR-MCNC: NEGATIVE — SIGNIFICANT CHANGE UP
NRBC # BLD: 0 /100 WBCS — SIGNIFICANT CHANGE UP (ref 0–0)
NRBC # BLD: 0 /100 WBCS — SIGNIFICANT CHANGE UP (ref 0–0)
PH UR: 5.5 — SIGNIFICANT CHANGE UP (ref 5–8)
PHOSPHATE SERPL-MCNC: 2.1 MG/DL — LOW (ref 2.5–4.5)
PLATELET # BLD AUTO: 323 K/UL — SIGNIFICANT CHANGE UP (ref 150–400)
PLATELET # BLD AUTO: 323 K/UL — SIGNIFICANT CHANGE UP (ref 150–400)
POTASSIUM SERPL-MCNC: 3.2 MMOL/L — LOW (ref 3.5–5.3)
POTASSIUM SERPL-SCNC: 3.2 MMOL/L — LOW (ref 3.5–5.3)
PROT SERPL-MCNC: 6 G/DL — SIGNIFICANT CHANGE UP (ref 6–8.3)
PROT UR-MCNC: 30 MG/DL
RBC # BLD: 3.25 M/UL — LOW (ref 3.8–5.2)
RBC # BLD: 3.28 M/UL — LOW (ref 3.8–5.2)
RBC # FLD: 14.1 % — SIGNIFICANT CHANGE UP (ref 10.3–14.5)
RBC # FLD: 14.2 % — SIGNIFICANT CHANGE UP (ref 10.3–14.5)
RBC CASTS # UR COMP ASSIST: 3 /HPF — SIGNIFICANT CHANGE UP (ref 0–4)
SODIUM SERPL-SCNC: 140 MMOL/L — SIGNIFICANT CHANGE UP (ref 135–145)
SP GR SPEC: 1.03 — SIGNIFICANT CHANGE UP (ref 1–1.03)
UROBILINOGEN FLD QL: 0.2 MG/DL — SIGNIFICANT CHANGE UP (ref 0.2–1)
WBC # BLD: 10.34 K/UL — SIGNIFICANT CHANGE UP (ref 3.8–10.5)
WBC # BLD: 9.37 K/UL — SIGNIFICANT CHANGE UP (ref 3.8–10.5)
WBC # FLD AUTO: 10.34 K/UL — SIGNIFICANT CHANGE UP (ref 3.8–10.5)
WBC # FLD AUTO: 9.37 K/UL — SIGNIFICANT CHANGE UP (ref 3.8–10.5)
WBC UR QL: 0 /HPF — SIGNIFICANT CHANGE UP (ref 0–5)

## 2024-11-25 RX ORDER — PANTOPRAZOLE SODIUM 40 MG/1
40 TABLET, DELAYED RELEASE ORAL EVERY 12 HOURS
Refills: 0 | Status: DISCONTINUED | OUTPATIENT
Start: 2024-11-25 | End: 2024-11-29

## 2024-11-25 RX ORDER — GABAPENTIN 300 MG/1
300 CAPSULE ORAL DAILY
Refills: 0 | Status: DISCONTINUED | OUTPATIENT
Start: 2024-11-25 | End: 2024-12-02

## 2024-11-25 RX ORDER — PIPERACILLIN SODIUM AND TAZOBACTAM SODIUM 4; .5 G/20ML; G/20ML
3.38 INJECTION, POWDER, LYOPHILIZED, FOR SOLUTION INTRAVENOUS ONCE
Refills: 0 | Status: COMPLETED | OUTPATIENT
Start: 2024-11-25 | End: 2024-11-25

## 2024-11-25 RX ORDER — POTASSIUM PHOSPHATE, MONOBASIC POTASSIUM PHOSPHATE, DIBASIC INJECTION, 236; 224 MG/ML; MG/ML
15 SOLUTION, CONCENTRATE INTRAVENOUS ONCE
Refills: 0 | Status: COMPLETED | OUTPATIENT
Start: 2024-11-25 | End: 2024-11-25

## 2024-11-25 RX ORDER — GLUCAGON INJECTION, SOLUTION 0.5 MG/.1ML
1 INJECTION, SOLUTION SUBCUTANEOUS ONCE
Refills: 0 | Status: DISCONTINUED | OUTPATIENT
Start: 2024-11-25 | End: 2024-12-02

## 2024-11-25 RX ORDER — ENALAPRIL MALEATE 2.5 MG/1
2.5 TABLET ORAL DAILY
Refills: 0 | Status: DISCONTINUED | OUTPATIENT
Start: 2024-11-25 | End: 2024-11-25

## 2024-11-25 RX ORDER — PIPERACILLIN SODIUM AND TAZOBACTAM SODIUM 4; .5 G/20ML; G/20ML
3.38 INJECTION, POWDER, LYOPHILIZED, FOR SOLUTION INTRAVENOUS EVERY 8 HOURS
Refills: 0 | Status: DISCONTINUED | OUTPATIENT
Start: 2024-11-25 | End: 2024-11-30

## 2024-11-25 RX ORDER — 0.9 % SODIUM CHLORIDE 0.9 %
1000 INTRAVENOUS SOLUTION INTRAVENOUS
Refills: 0 | Status: COMPLETED | OUTPATIENT
Start: 2024-11-25 | End: 2024-11-26

## 2024-11-25 RX ORDER — ACETAMINOPHEN 500MG 500 MG/1
650 TABLET, COATED ORAL EVERY 6 HOURS
Refills: 0 | Status: DISCONTINUED | OUTPATIENT
Start: 2024-11-25 | End: 2024-12-02

## 2024-11-25 RX ORDER — ACETAMINOPHEN, DIPHENHYDRAMINE HCL, PHENYLEPHRINE HCL 325; 25; 5 MG/1; MG/1; MG/1
3 TABLET ORAL AT BEDTIME
Refills: 0 | Status: DISCONTINUED | OUTPATIENT
Start: 2024-11-25 | End: 2024-12-02

## 2024-11-25 RX ORDER — 0.9 % SODIUM CHLORIDE 0.9 %
1500 INTRAVENOUS SOLUTION INTRAVENOUS ONCE
Refills: 0 | Status: DISCONTINUED | OUTPATIENT
Start: 2024-11-25 | End: 2024-11-25

## 2024-11-25 RX ORDER — POTASSIUM CHLORIDE 600 MG/1
10 TABLET, EXTENDED RELEASE ORAL
Refills: 0 | Status: COMPLETED | OUTPATIENT
Start: 2024-11-25 | End: 2024-11-25

## 2024-11-25 RX ORDER — METOPROLOL TARTRATE 100 MG/1
50 TABLET, FILM COATED ORAL
Refills: 0 | Status: DISCONTINUED | OUTPATIENT
Start: 2024-11-25 | End: 2024-11-25

## 2024-11-25 RX ADMIN — ACETAMINOPHEN 500MG 400 MILLIGRAM(S): 500 TABLET, COATED ORAL at 00:18

## 2024-11-25 RX ADMIN — PIPERACILLIN SODIUM AND TAZOBACTAM SODIUM 200 GRAM(S): 4; .5 INJECTION, POWDER, LYOPHILIZED, FOR SOLUTION INTRAVENOUS at 06:36

## 2024-11-25 RX ADMIN — PANTOPRAZOLE SODIUM 40 MILLIGRAM(S): 40 TABLET, DELAYED RELEASE ORAL at 17:05

## 2024-11-25 RX ADMIN — Medication 2: at 07:14

## 2024-11-25 RX ADMIN — Medication 10 MILLIGRAM(S): at 21:25

## 2024-11-25 RX ADMIN — ACETAMINOPHEN, DIPHENHYDRAMINE HCL, PHENYLEPHRINE HCL 3 MILLIGRAM(S): 325; 25; 5 TABLET ORAL at 21:57

## 2024-11-25 RX ADMIN — PIPERACILLIN SODIUM AND TAZOBACTAM SODIUM 25 GRAM(S): 4; .5 INJECTION, POWDER, LYOPHILIZED, FOR SOLUTION INTRAVENOUS at 08:15

## 2024-11-25 RX ADMIN — Medication 2: at 11:52

## 2024-11-25 RX ADMIN — PANTOPRAZOLE SODIUM 40 MILLIGRAM(S): 40 TABLET, DELAYED RELEASE ORAL at 06:36

## 2024-11-25 RX ADMIN — POTASSIUM CHLORIDE 100 MILLIEQUIVALENT(S): 600 TABLET, EXTENDED RELEASE ORAL at 18:03

## 2024-11-25 RX ADMIN — Medication 2: at 17:05

## 2024-11-25 RX ADMIN — Medication 65 MILLILITER(S): at 12:39

## 2024-11-25 RX ADMIN — POTASSIUM CHLORIDE 100 MILLIEQUIVALENT(S): 600 TABLET, EXTENDED RELEASE ORAL at 19:19

## 2024-11-25 RX ADMIN — ACETAMINOPHEN 500MG 650 MILLIGRAM(S): 500 TABLET, COATED ORAL at 18:05

## 2024-11-25 RX ADMIN — Medication 2: at 02:38

## 2024-11-25 RX ADMIN — ACETAMINOPHEN 500MG 1000 MILLIGRAM(S): 500 TABLET, COATED ORAL at 01:00

## 2024-11-25 RX ADMIN — POTASSIUM PHOSPHATE, MONOBASIC POTASSIUM PHOSPHATE, DIBASIC INJECTION, 62.5 MILLIMOLE(S): 236; 224 SOLUTION, CONCENTRATE INTRAVENOUS at 11:21

## 2024-11-25 RX ADMIN — Medication 100 GRAM(S): at 11:21

## 2024-11-25 RX ADMIN — PIPERACILLIN SODIUM AND TAZOBACTAM SODIUM 25 GRAM(S): 4; .5 INJECTION, POWDER, LYOPHILIZED, FOR SOLUTION INTRAVENOUS at 14:19

## 2024-11-25 RX ADMIN — PIPERACILLIN SODIUM AND TAZOBACTAM SODIUM 25 GRAM(S): 4; .5 INJECTION, POWDER, LYOPHILIZED, FOR SOLUTION INTRAVENOUS at 21:24

## 2024-11-25 NOTE — PATIENT PROFILE ADULT - FALL HARM RISK - HARM RISK INTERVENTIONS

## 2024-11-25 NOTE — H&P ADULT - ATTENDING COMMENTS
d/w family and staff plan of care  supplement mg,k  daily labs  sepsis on admittion  f/up culture results

## 2024-11-25 NOTE — H&P ADULT - NSHPREVIEWOFSYSTEMS_GEN_ALL_CORE
REVIEW OF SYSTEMS: Limited due to dementia     CONSTITUTIONAL: +Fevers, No weakness or chills  EYES/ENT: No visual changes;  No vertigo or throat pain   NECK: No pain or stiffness  RESPIRATORY: No cough, wheezing, hemoptysis; No shortness of breath  CARDIOVASCULAR: No chest pain or palpitations  GASTROINTESTINAL: +Melena, No abdominal or epigastric pain. No nausea, vomiting, or hematemesis; No diarrhea or constipation. No hematochezia.  GENITOURINARY: No dysuria, frequency or hematuria  NEUROLOGICAL: No numbness or weakness  SKIN: No itching, burning, rashes, or lesions   All other review of systems is negative unless indicated above.

## 2024-11-25 NOTE — CONSULT NOTE ADULT - SUBJECTIVE AND OBJECTIVE BOX
[  ] STAT REQUEST              [ X ] ROUTINE REQUEST    Patient is a 84 year old female with GI bleeding and anemia. GI consulted to evaluate.       HPI:  Patient is a 84 year old female, from rehab, usually lives at home, ambulates with a cane and PMHx significant for DM, HTN, HLD and dementia (baseline AOx2-3) presents to the ED for fever and melena after recently being admitted to [facility] for melena. Significant collateral is obtained from daughters at bedside. Report that patient had two episodes of dark black stools at rehab today. Unable to quantify as episode was not witness by daughters and patient is unable to recall. No bright red blood as per daughters. Patient also reports fever since the morning of 2024. She denies any other changes to bowel movements including hematochezia, diarrhea and constipation. She denies any changes in urination including hematuria. She denies, cough and congestion. Patient has no other complaints. (2024 03:21)      PAIN MANAGEMENT:  Pain Scale:                 /10  Pain Location:      Prior Colonoscopy:    PAST MEDICAL HISTORY  DM (diabetes mellitus)    HTN (hypertension)    HLD (hyperlipidemia)        PAST SURGICAL HISTORY  No significant past surgical history        Allergies    No Known Allergies    Intolerances        HOME MEDICATIONS    MEDICATIONS  (STANDING):  atorvastatin 10 milliGRAM(s) Oral at bedtime  gabapentin 300 milliGRAM(s) Oral daily  glucagon  Injectable 1 milliGRAM(s) IntraMuscular once  insulin lispro (ADMELOG) corrective regimen sliding scale   SubCutaneous every 6 hours  lactated ringers Bolus 1500 milliLiter(s) IV Bolus once  magnesium sulfate  IVPB 1 Gram(s) IV Intermittent once  pantoprazole  Injectable 40 milliGRAM(s) IV Push every 12 hours  piperacillin/tazobactam IVPB.. 3.375 Gram(s) IV Intermittent every 8 hours  potassium phosphate IVPB 15 milliMole(s) IV Intermittent once    MEDICATIONS  (PRN):  acetaminophen     Tablet .. 650 milliGRAM(s) Oral every 6 hours PRN Temp greater or equal to 38C (100.4F), Mild Pain (1 - 3)  melatonin 3 milliGRAM(s) Oral at bedtime PRN Insomnia      SOCIAL HISTORY  Advanced Directives:       [  ] Full Code       [  ] DNR  Marital Status:         [  ] M      [  ] S      [  ] D       [  ] W  Children:       [  ] Yes      [  ] No  Occupation:        [  ] Employed       [  ] Unemployed       [  ] Retired  Diet:       [  ] Regular       [  ] PEG feeding          [  ] NG tube feeding  Drug Use:           [  ] Patient denied          [  ] Yes  Alcohol:           [  ] No             [  ] Yes (socially)         [  ] Yes (chronic)  Tobacco:           [  ] Yes           [  ] No    FAMILY HISTORY  [  ] Heart Disease            [  ] Diabetes             [  ] HTN             [  ] Colon Cancer             [  ] Stomach Cancer              [  ] Pancreatic Cancer    VITAL SIGNS   Vital Signs Last 24 Hrs  T(C): 36.9 (24 @ 04:41), Max: 38.3 (24 @ 20:48)  T(F): 98.4 (24 @ 04:41), Max: 101 (24 @ 20:48)  HR: 89 (24 @ 04:41) (89 - 121)  BP: 133/64 (24 @ 04:41) (107/70 - 133/64)  BP(mean): 90 (24 @ 02:55) (85 - 90)  RR: 18 (24 @ 04:41) (18 - 24)  SpO2: 99% (24 @ 04:41) (93% - 99%)  Daily Height in cm: 149.86 (2024 20:48)    Daily Weight in k.1 (2024 04:41)       CBC Full  -  ( 2024 07:22 )  WBC Count : 10.34 K/uL  RBC Count : 3.25 M/uL  Hemoglobin : 9.8 g/dL  Hematocrit : 29.5 %  Platelet Count - Automated : 323 K/uL  Mean Cell Volume : 90.8 fl  Mean Cell Hemoglobin : 30.2 pg  Mean Cell Hemoglobin Concentration : 33.2 g/dL  Auto Neutrophil # : 7.60 K/uL  Auto Lymphocyte # : 1.37 K/uL  Auto Monocyte # : 1.15 K/uL  Auto Eosinophil # : 0.15 K/uL  Auto Basophil # : 0.02 K/uL  Auto Neutrophil % : 73.5 %  Auto Lymphocyte % : 13.2 %  Auto Monocyte % : 11.1 %  Auto Eosinophil % : 1.5 %  Auto Basophil % : 0.2 %          140  |  108  |  18  ----------------------------<  188[H]  3.2[L]   |    |  0.52    Ca    8.6      2024 07:22  Phos  2.1       Mg     1.5         TPro  6.0  /  Alb  2.1[L]  /  TBili  0.4  /  DBili  x   /  AST  25  /  ALT  65[H]  /  AlkPhos  68            ALT/SGPT 65  Albumin, Serum 2.1  Alkaline Phosphatase 68  AST/SGOT 25  Bilirubin Direct --  Bilirubin Total 0.4  Indirect Bilirubin --  Hepatitis A Total --  Hepatitis B Core Antibody --  Hepatitis B Surface Antibody --  Hepatitis B Surface Antigen --  Hepatitis C Virus Interpretation --  Hepatitis C Virus Genotype --  ALT/SGPT 86  Albumin, Serum 2.6  Alkaline Phosphatase 79  AST/SGOT 25  Bilirubin Direct --  Bilirubin Total 0.4  Indirect Bilirubin --  Hepatitis A Total --  Hepatitis B Core Antibody --  Hepatitis B Surface Antibody --  Hepatitis B Surface Antigen --  Hepatitis C Virus Interpretation --  Hepatitis C Virus Genotype --          PT/INR - ( 2024 21:25 )   PT: 17.1 sec;   INR: 1.47 ratio         PTT - ( 2024 21:25 )  PTT:25.6 sec    RADIOLOGY/IMAGING                           [  ] STAT REQUEST              [ X ] ROUTINE REQUEST    Patient is a 84 year old female with GI bleeding and anemia. GI consulted to evaluate.     HPI:  Patient is a 84 year old female, from rehab, usually lives at home, ambulates with a cane with past medical history significant for DM, HTN, Hyperlipidemia and dementia presented to the emergency room with few episodes of melena and fever. patient recently had EGD for melena where she was found to have duodenal ulcer. No abdominal pain, nausea, vomiting, hematemesis, hematochezia, hemoptysis, chest pain, SOB, cough, hematuria, dysuria or diarrhea reported.      PAIN MANAGEMENT:  Pain Scale:                0 /10  Pain Location:         PAST MEDICAL HISTORY    DM (diabetes mellitus)    HTN (hypertension)    Hyperlipidemia    Dementia        PAST SURGICAL HISTORY    No significant past surgical history reported        Allergies    No Known Allergies    Intolerances  None         MEDICATIONS  (STANDING):  atorvastatin 10 milliGRAM(s) Oral at bedtime  gabapentin 300 milliGRAM(s) Oral daily  glucagon  Injectable 1 milliGRAM(s) IntraMuscular once  insulin lispro (ADMELOG) corrective regimen sliding scale   SubCutaneous every 6 hours  lactated ringers Bolus 1500 milliLiter(s) IV Bolus once  magnesium sulfate  IVPB 1 Gram(s) IV Intermittent once  pantoprazole  Injectable 40 milliGRAM(s) IV Push every 12 hours  piperacillin/tazobactam IVPB.. 3.375 Gram(s) IV Intermittent every 8 hours  potassium phosphate IVPB 15 milliMole(s) IV Intermittent once    MEDICATIONS  (PRN):  acetaminophen     Tablet .. 650 milliGRAM(s) Oral every 6 hours PRN Temp greater or equal to 38C (100.4F), Mild Pain (1 - 3)  melatonin 3 milliGRAM(s) Oral at bedtime PRN Insomnia      SOCIAL HISTORY  Advanced Directives:       [ X ] Full Code       [  ] DNR  Marital Status:         [  ] M      [ X ] S      [  ] D       [  ] W  Children:       [ X ] Yes      [  ] No  Occupation:        [  ] Employed       [ X ] Unemployed       [  ] Retired  Diet:       [ X ] Regular       [  ] PEG feeding          [  ] NG tube feeding  Drug Use:           [ X ] No           [  ] Yes  Alcohol:           [X  ] No             [  ] Yes (socially)         [  ] Yes (chronic)  Tobacco:           [  ] Yes           [ X ] No      FAMILY HISTORY  [ X ] Heart Disease            [ X ] Diabetes             [ X ] HTN             [  ] Colon Cancer             [  ] Stomach Cancer              [  ] Pancreatic Cancer      VITAL SIGNS   Vital Signs Last 24 Hrs  T(C): 36.9 (24 @ 04:41), Max: 38.3 (24 @ 20:48)  T(F): 98.4 (24 @ 04:41), Max: 101 (24 @ 20:48)  HR: 89 (24 @ 04:41) (89 - 121)  BP: 133/64 (24 @ 04:41) (107/70 - 133/64)  BP(mean): 90 (24 @ 02:55) (85 - 90)  RR: 18 (24 @ 04:41) (18 - 24)  SpO2: 99% (24 @ 04:41) (93% - 99%)  Daily Height in cm: 149.86 (2024 20:48)    Daily Weight in k.1 (2024 04:41)         CBC Full  -  ( 2024 07:22 )  WBC Count : 10.34 K/uL  RBC Count : 3.25 M/uL  Hemoglobin : 9.8 g/dL  Hematocrit : 29.5 %  Platelet Count - Automated : 323 K/uL  Mean Cell Volume : 90.8 fl  Mean Cell Hemoglobin : 30.2 pg  Mean Cell Hemoglobin Concentration : 33.2 g/dL  Auto Neutrophil # : 7.60 K/uL  Auto Lymphocyte # : 1.37 K/uL  Auto Monocyte # : 1.15 K/uL  Auto Eosinophil # : 0.15 K/uL  Auto Basophil # : 0.02 K/uL  Auto Neutrophil % : 73.5 %  Auto Lymphocyte % : 13.2 %  Auto Monocyte % : 11.1 %  Auto Eosinophil % : 1.5 %  Auto Basophil % : 0.2 %          140  |  108  |  18  ----------------------------<  188[H]  3.2[L]   |  25  |  0.52    Ca    8.6      2024 07:22  Phos  2.1       Mg     1.5         TPro  6.0  /  Alb  2.1[L]  /  TBili  0.4  /  DBili  x   /  AST  25  /  ALT  65[H]  /  AlkPhos  68        PT/INR - ( 2024 21:25 )   PT: 17.1 sec;   INR: 1.47 ratio       PTT - ( 2024 21:25 )  PTT:25.6 sec    Urinalysis with Rflx Culture (24 @ 02:02)   Urine Appearance: Clear  Color: Yellow  Specific Gravity: 1.030  pH Urine: 5.5  Protein, Urine: 30 mg/dL  Glucose Qualitative, Urine: >=1000 mg/dL  Ketone - Urine: 15 mg/dL  Blood, Urine: Trace  Bilirubin: Negative  Urobilinogen: 0.2 mg/dL  Leukocyte Esterase Concentration: Negative  Nitrite: Negative      ECG (24 @ 17:57)      Ventricular Rate 74 BPM    Atrial Rate 74 BPM    P-R Interval 142 ms    QRS Duration 94 ms    Q-T Interval 424 ms    QTC Calculation(Bazett) 470 ms    P Axis 22 degrees    R Axis -21 degrees    T Axis 157 degrees    Diagnosis Line Normal sinus rhythm  Left ventricular hypertrophy with repolarization abnormality  Abnormal ECG    < end of copied text >      RADIOLOGY/IMAGING        ACC: [id number] EXAM:  CT ABDOMEN AND PELVIS IC   ORDERED BY: MEGAN MOLINA     PROCEDURE DATE:  2024          INTERPRETATION:  CLINICAL INFORMATION: Abdominal pain.    COMPARISON: None.    CONTRAST/COMPLICATIONS:  IV Contrast: Omnipaque 350  90 ccadministered   10 cc discarded  Oral Contrast: None      PROCEDURE:  CT of the Abdomen and Pelvis was performed.  Sagittal and coronal reformats were performed.    FINDINGS:  LOWER CHEST: Coronary and aortic valve calcifications.    LIVER: Within normal limits.  BILE DUCTS: Normal caliber.  GALLBLADDER: Within normal limits.  SPLEEN: Within normal limits.  PANCREAS: Few punctate calcifications within the pancreatic body may   represent chronic sequelae of pancreatitis.  ADRENALS: Within normal limits.  KIDNEYS/URETERS: Symmetric renal enhancement. No hydronephrosis. 1.8 cm   left lower pole cyst. 1.7 cm intermediate density right lower pole   lesion, probably complex cyst. Subcentimeter hypodensity in the right   mid/lower renal cortex is toosmall to characterize.    BLADDER: Distended.  REPRODUCTIVE ORGANS: Caudal displacement of the uterus. Uterus and adnexa   are otherwise within normal limits.    BOWEL: No bowel obstruction. Caudal displacement of the rectum. Appendix   is normal. Wall thickening of the first, second, and third duodenal   segments with surrounding fat stranding. No extraluminal collection or   free air.  PERITONEUM/RETROPERITONEUM: No ascites or pneumoperitoneum.  VESSELS: Atherosclerotic changes.  LYMPH NODES: No lymphadenopathy.  ABDOMINAL WALL: Within normal limits.  BONES: Degenerative changes, most severe in the lumbar spine. Mild   thoracolumbar levocurvature.    IMPRESSION:  1.  Duodenitis. Consider outpatient follow-up with endoscopy to evaluate   forunderlying ulcer/lesion.  2.  Suggestion of pelvic floor weakness with caudal displacement of the   uterus and rectum.                 ACC: [id number] EXAM:  XR CHEST PORTABLE IMMED 1V   ORDERED BY: ZAY JENKINS     PROCEDURE DATE:  2024          INTERPRETATION:  AP supine chest on 2024 at 9:54 PM. Patient   has sepsis.    Elevated diaphragms crowds the chest.    Heart normal for projection.    Quite advanced bilateral shoulder degeneration again noted.    Lungs are grossly clear and chest is similar to  this year.    IMPRESSION: No acute finding or change.

## 2024-11-25 NOTE — H&P ADULT - NSHPPHYSICALEXAM_GEN_ALL_CORE
GENERAL: NAD, lying in bed comfortably  HEAD:  Atraumatic, Normocephalic  EYES: EOMI, PERRLA, conjunctiva and sclera clear  ENT: Moist mucous membranes  NECK: Supple, No JVD  CHEST/LUNG: Clear to auscultation bilaterally; No rales, rhonchi, wheezing, or rubs. Unlabored respirations  HEART: Regular rate and rhythm; No murmurs, rubs, or gallops  ABDOMEN: Suprapubic tenderness. Bowel sounds present; Soft, Nondistended.  EXTREMITIES:  2+ Peripheral Pulses, brisk capillary refill. No clubbing, cyanosis, or edema  NERVOUS SYSTEM:  Alert & Oriented X2 (location, self, not time), speech clear. No focal deficits   MSK: FROM all 4 extremities, full and equal strength  SKIN: No rashes or lesions    PATIENT REFUSED RONALDO FROM MALE PROVIDER    Vital Signs Last 24 Hrs  T(C): 37.1 (25 Nov 2024 02:55), Max: 38.3 (24 Nov 2024 20:48)  T(F): 98.8 (25 Nov 2024 02:55), Max: 101 (24 Nov 2024 20:48)  HR: 91 (25 Nov 2024 02:55) (91 - 121)  BP: 116/76 (25 Nov 2024 02:55) (107/70 - 130/85)  BP(mean): 90 (25 Nov 2024 02:55) (85 - 90)  RR: 20 (25 Nov 2024 02:55) (19 - 24)  SpO2: 96% (25 Nov 2024 02:55) (93% - 97%)    Parameters below as of 25 Nov 2024 02:55  Patient On (Oxygen Delivery Method): room air

## 2024-11-25 NOTE — CONSULT NOTE ADULT - ASSESSMENT
1. Melena  2. Anemia  3. Duodenal ulcer    Suggestions:    1. Monitor H/H  2. Transfuse PRBC as needed  3. Protonix IV  4. Avoid NSAID  5. EGD tomorrow unless active bleeding then   patient must be transfer to another facility  6. NPO  7. IVF hydration  8. Monitor electrolytes  9. DVT prophylaxis

## 2024-11-25 NOTE — H&P ADULT - PROBLEM SELECTOR PLAN 2
P/w Positive SIRS criteria (Temp 101F, HR 121BPM, WBC 14.8)  Code sepsis in the ED, CXR wet read negative, UA negative  No Abx or fluids given in ED  Uptrending WBCs  - Started Zosyn  - 1.5L LR ordered  - Lactate 1.2  - F/U blood cultures

## 2024-11-25 NOTE — CHART NOTE - NSCHARTNOTEFT_GEN_A_CORE
EVENT:  Pt seen and examined     SUBJECTIVE:  Pt seen w/ dtr-Sury and Grandson-Abdirizak at bedside.  Family informed NP they would translate for pt.  Nepalese -Tagir # [id number].    OBJECTIVE  REVIEW OF SYSTEMS:    CONSTITUTIONAL: No fever  EYES: No acute visual disturbances  NECK: No pain or stiffness  RESPIRATORY: No cough; No shortness of breath  CARDIOVASCULAR: No chest pain, no palpitations  GASTROINTESTINAL: No pain. No nausea or vomiting.  No diarrhea   NEUROLOGICAL: No headache or numbness, no tremors  MUSCULOSKELETAL: No joint pain, no muscle pain  GENITOURINARY: No dysuria, no frequency, no hesitancy  PSYCHIATRY: No depression , no anxiety  ALL OTHER  ROS negative      PHYSICAL EXAM:  GENERAL: NAD  HEENT: Normocephalic;  conjunctivae and sclerae clear; moist mucous membranes;   NECK : supple  CHEST/LUNG: Clear to auscultation bilaterally with good air entry   HEART: S1 S2  regular; no murmurs, gallops or rubs  ABDOMEN: Soft, Nontender, Nondistended; Bowel sounds present x 4 quad  EXTREMITIES: No cyanosis; no edema; no calf tenderness  SKIN: Warm and dry; no rash  NERVOUS SYSTEM:  Awake and alert; Oriented  to place, person and time ; no new deficits      Vital Signs Last 24 Hrs  T(C): 36.9 (25 Nov 2024 13:54), Max: 38.3 (24 Nov 2024 20:48)  T(F): 98.4 (25 Nov 2024 13:54), Max: 101 (24 Nov 2024 20:48)  HR: 88 (25 Nov 2024 13:54) (88 - 121)  BP: 132/76 (25 Nov 2024 13:54) (107/70 - 133/64)  BP(mean): 90 (25 Nov 2024 02:55) (85 - 90)  RR: 18 (25 Nov 2024 13:54) (18 - 24)  SpO2: 100% (25 Nov 2024 13:54) (93% - 100%)    Parameters below as of 25 Nov 2024 13:54  Patient On (Oxygen Delivery Method): room air        LABS:                        9.8    10.34 )-----------( 323      ( 25 Nov 2024 07:22 )             29.5     11-25    140  |  108  |  18  ----------------------------<  188[H]  3.2[L]   |  25  |  0.52    Ca    8.6      25 Nov 2024 07:22  Phos  2.1     11-25  Mg     1.5     11-25    TPro  6.0  /  Alb  2.1[L]  /  TBili  0.4  /  DBili  x   /  AST  25  /  ALT  65[H]  /  AlkPhos  68  11-25      EKG:   IMAGING:    ASSESSMENT:  83yo, F, From [facility], usually lives at home with family, ambulates with a cane and PMH of DM, HTN, HLD and dementia (baseline AOx2-3).  Presents to the ED for fever and melena.  Admitted GI bleed and sepsis tuttle.           Problem/Plan - 1:  ·  Problem: Melena.   ·  Plan:   - P/w two episodes of melena at rehab.  Recently dc'd from [facility] 11/18-11/23 for GI bleed and melena  - Continue to monitor CBC   - Maintain active T&S  - NPO for now  - C/w Protonix BID  - GI-Dr. Glass planning for EGD in AM     Problem/Plan - 2:  ·  Problem: Sepsis.   ·  Plan:   - On admission met SIRS: Temp 101F, HR 121BPM, WBC 14.8  - Code sepsis in the ED  - CXR & UA negative  - Currently on Zosyn.  ? ID-f/u   - Bld cx's pending-f/u results   - Currently on IVF      Problem/Plan - 3:  ·  Problem: DM (diabetes mellitus).   ·  Plan:  H/o DM on Metformin & Mounjaro  - C/w HSS      Problem/Plan - 4:  ·  Problem: HTN (hypertension).   ·  Plan:   - Currently holding BP meds iso possible GI bleed  - Continue to monitor BP and adjust medication accordingly      Problem/Plan - 5:  ·  Problem: HLD (hyperlipidemia).   ·  Plan:   - C/w Atorvastatin     Problem/Plan - 6:  ·  Problem: Prophylactic measure.   ·  Plan:   - C/w SCDs   - C/w Protonix BID EVENT:  Pt seen and examined     SUBJECTIVE:  Pt seen w/ dtr-Sury and Grandson-Abdirizak at bedside.  Family informed NP they would translate for pt.  Pt and dtr deny HA, dizziness, lightheadedness, SOB, CP or abdominal pain.  Pt and dtr deny BM today.  Latvian -Tagir # [id number].    OBJECTIVE  REVIEW OF SYSTEMS:    CONSTITUTIONAL: No fever  EYES: No acute visual disturbances  NECK: No pain or stiffness  RESPIRATORY: No cough; No shortness of breath  CARDIOVASCULAR: No chest pain, no palpitations  GASTROINTESTINAL: No pain. No nausea or vomiting.  No diarrhea   NEUROLOGICAL: No headache or numbness, no tremors  MUSCULOSKELETAL: No joint pain, no muscle pain  GENITOURINARY: No dysuria, no frequency, no hesitancy  PSYCHIATRY: No depression , no anxiety  ALL OTHER  ROS negative      PHYSICAL EXAM:  GENERAL: NAD  HEENT: Normocephalic;  conjunctivae and sclerae clear; moist mucous membranes;   NECK : supple  CHEST/LUNG: Clear to auscultation bilaterally with good air entry   HEART: S1 S2  regular; no murmurs, gallops or rubs  ABDOMEN: Soft, Nontender, Nondistended; Bowel sounds present x 4 quad  EXTREMITIES: No cyanosis; no edema; no calf tenderness  SKIN: Warm and dry; no rash  NERVOUS SYSTEM:  Awake and alert; Oriented  to place, person and time ; no new deficits      Vital Signs Last 24 Hrs  T(C): 36.9 (25 Nov 2024 13:54), Max: 38.3 (24 Nov 2024 20:48)  T(F): 98.4 (25 Nov 2024 13:54), Max: 101 (24 Nov 2024 20:48)  HR: 88 (25 Nov 2024 13:54) (88 - 121)  BP: 132/76 (25 Nov 2024 13:54) (107/70 - 133/64)  BP(mean): 90 (25 Nov 2024 02:55) (85 - 90)  RR: 18 (25 Nov 2024 13:54) (18 - 24)  SpO2: 100% (25 Nov 2024 13:54) (93% - 100%)    Parameters below as of 25 Nov 2024 13:54  Patient On (Oxygen Delivery Method): room air        LABS:                        9.8    10.34 )-----------( 323      ( 25 Nov 2024 07:22 )             29.5     11-25    140  |  108  |  18  ----------------------------<  188[H]  3.2[L]   |  25  |  0.52    Ca    8.6      25 Nov 2024 07:22  Phos  2.1     11-25  Mg     1.5     11-25    TPro  6.0  /  Alb  2.1[L]  /  TBili  0.4  /  DBili  x   /  AST  25  /  ALT  65[H]  /  AlkPhos  68  11-25      EKG:   IMAGING:    ASSESSMENT:  83yo, F, From [facility], usually lives at home with family, ambulates with a cane and PMH of DM, HTN, HLD and dementia (baseline AOx2-3).  Presents to the ED for fever and melena.  Admitted GI bleed and sepsis tuttle.           Problem/Plan - 1:  ·  Problem: Melena.   ·  Plan:   - P/w two episodes of melena at rehab.  Recently dc'd from [facility] 11/18-11/23 for GI bleed and melena  - Continue to monitor CBC.  Planning for PM CBC-f/u results   - Maintain active T&S  - NPO for now  - C/w Protonix BID  - GI-Dr. Glass planning for EGD in AM     Problem/Plan - 2:  ·  Problem: Sepsis.   ·  Plan:   - On admission met SIRS: Temp 101F, HR 121BPM, WBC 14.8  - Code sepsis in the ED  - CXR & UA negative  - Currently on Zosyn.  ? ID-f/u   - Bld cx's pending-f/u results   - Currently on IVF      Problem/Plan - 3:  ·  Problem: DM (diabetes mellitus).   ·  Plan:  H/o DM on Metformin & Mounjaro  - C/w HSS      Problem/Plan - 4:  ·  Problem: HTN (hypertension).   ·  Plan:   - Currently holding BP meds iso possible GI bleed  - Continue to monitor BP and adjust medication accordingly      Problem/Plan - 5:  ·  Problem: HLD (hyperlipidemia).   ·  Plan:   - C/w Atorvastatin    Problem/Plan - 6:  ·  Problem: Electrolyte Abnormalities.   ·  Plan:  - Supplemented today  - Continue to monitor      Problem/Plan - 7:  ·  Problem: Prophylactic measure.   ·  Plan:   - C/w SCDs   - C/w Protonix BID

## 2024-11-25 NOTE — H&P ADULT - HISTORY OF PRESENT ILLNESS
Patient is a 84 year old female, from rehab, usually lives at home, ambulates with a cane and PMHx significant for DM, HTN, HLD and dementia (baseline AOx2-3) presents to the ED for fever and melena after recently being admitted to [facility] for melena. Significant collateral is obtained from daughters at bedside. Report that patient had two episodes of dark black stools at rehab today. Unable to quantify as episode was not witness by daughters and patient is unable to recall. No bright red blood as per daughters. Patient also reports fever since the morning of 11/24/2024. She denies any other changes to bowel movements including hematochezia, diarrhea and constipation. She denies any changes in urination including hematuria. She denies, cough and congestion. Patient has no other complaints.

## 2024-11-25 NOTE — H&P ADULT - PROBLEM SELECTOR PLAN 4
On Enalapril, Clonidine, Metoprolol  at home  - Holding all BP meds iso possible GI bleed  - Restart medication as indicated

## 2024-11-25 NOTE — H&P ADULT - ASSESSMENT
Patient is a 84 year old female, from rehab, usually lives at home, ambulates with a cane and PMHx significant for DM, HTN, HLD and dementia (baseline AOx2-3) presents to the ED for fever and melena. Patient with recent admission to [facility] for GI bleed. Code sepsis in the ED, CXR wet read wnl, Lactate 1.2, UA negative, not given fluids or antibiotics. Patient is being admitted for workup of melena and fever.

## 2024-11-25 NOTE — H&P ADULT - PROBLEM SELECTOR PLAN 1
P/w two episodes of melena at rehab  S/p recent discharge from [facility] for GI bleed and melena  Hb stable at 11.6  - Holding all antihypertensives   - Maintain two large bore IVs   - Maintain active T&S  - NPO for now  - Protonix 40 bid P/w two episodes of melena at rehab  S/p recent discharge from [facility] for GI bleed and melena  Hb stable at 11.6  - Holding all antihypertensives   - Maintain two large bore IVs   - Maintain active T&S  - NPO for now  - Protonix 40 bid  - Primary team to consult GI in the AM

## 2024-11-26 DIAGNOSIS — E87.8 OTHER DISORDERS OF ELECTROLYTE AND FLUID BALANCE, NOT ELSEWHERE CLASSIFIED: ICD-10-CM

## 2024-11-26 LAB
ANION GAP SERPL CALC-SCNC: 8 MMOL/L — SIGNIFICANT CHANGE UP (ref 5–17)
APTT BLD: 28.2 SEC — SIGNIFICANT CHANGE UP (ref 24.5–35.6)
BUN SERPL-MCNC: 12 MG/DL — SIGNIFICANT CHANGE UP (ref 7–18)
CALCIUM SERPL-MCNC: 8.9 MG/DL — SIGNIFICANT CHANGE UP (ref 8.4–10.5)
CHLORIDE SERPL-SCNC: 105 MMOL/L — SIGNIFICANT CHANGE UP (ref 96–108)
CO2 SERPL-SCNC: 24 MMOL/L — SIGNIFICANT CHANGE UP (ref 22–31)
CREAT SERPL-MCNC: 0.41 MG/DL — LOW (ref 0.5–1.3)
EGFR: 96 ML/MIN/1.73M2 — SIGNIFICANT CHANGE UP
GLUCOSE BLDC GLUCOMTR-MCNC: 175 MG/DL — HIGH (ref 70–99)
GLUCOSE BLDC GLUCOMTR-MCNC: 190 MG/DL — HIGH (ref 70–99)
GLUCOSE BLDC GLUCOMTR-MCNC: 202 MG/DL — HIGH (ref 70–99)
GLUCOSE BLDC GLUCOMTR-MCNC: 224 MG/DL — HIGH (ref 70–99)
GLUCOSE BLDC GLUCOMTR-MCNC: 231 MG/DL — HIGH (ref 70–99)
GLUCOSE SERPL-MCNC: 221 MG/DL — HIGH (ref 70–99)
HCT VFR BLD CALC: 27.8 % — LOW (ref 34.5–45)
HGB BLD-MCNC: 9.4 G/DL — LOW (ref 11.5–15.5)
INR BLD: 1.96 RATIO — HIGH (ref 0.85–1.16)
MAGNESIUM SERPL-MCNC: 1.6 MG/DL — SIGNIFICANT CHANGE UP (ref 1.6–2.6)
MCHC RBC-ENTMCNC: 30.4 PG — SIGNIFICANT CHANGE UP (ref 27–34)
MCHC RBC-ENTMCNC: 33.8 G/DL — SIGNIFICANT CHANGE UP (ref 32–36)
MCV RBC AUTO: 90 FL — SIGNIFICANT CHANGE UP (ref 80–100)
NRBC # BLD: 0 /100 WBCS — SIGNIFICANT CHANGE UP (ref 0–0)
PHOSPHATE SERPL-MCNC: 2.3 MG/DL — LOW (ref 2.5–4.5)
PLATELET # BLD AUTO: 324 K/UL — SIGNIFICANT CHANGE UP (ref 150–400)
POTASSIUM SERPL-MCNC: 3.6 MMOL/L — SIGNIFICANT CHANGE UP (ref 3.5–5.3)
POTASSIUM SERPL-SCNC: 3.6 MMOL/L — SIGNIFICANT CHANGE UP (ref 3.5–5.3)
PROTHROM AB SERPL-ACNC: 22.6 SEC — HIGH (ref 9.9–13.4)
RBC # BLD: 3.09 M/UL — LOW (ref 3.8–5.2)
RBC # FLD: 13.9 % — SIGNIFICANT CHANGE UP (ref 10.3–14.5)
SODIUM SERPL-SCNC: 137 MMOL/L — SIGNIFICANT CHANGE UP (ref 135–145)
WBC # BLD: 9.74 K/UL — SIGNIFICANT CHANGE UP (ref 3.8–10.5)
WBC # FLD AUTO: 9.74 K/UL — SIGNIFICANT CHANGE UP (ref 3.8–10.5)

## 2024-11-26 RX ORDER — POTASSIUM PHOSPHATE, MONOBASIC POTASSIUM PHOSPHATE, DIBASIC INJECTION, 236; 224 MG/ML; MG/ML
15 SOLUTION, CONCENTRATE INTRAVENOUS ONCE
Refills: 0 | Status: COMPLETED | OUTPATIENT
Start: 2024-11-26 | End: 2024-11-26

## 2024-11-26 RX ADMIN — PIPERACILLIN SODIUM AND TAZOBACTAM SODIUM 25 GRAM(S): 4; .5 INJECTION, POWDER, LYOPHILIZED, FOR SOLUTION INTRAVENOUS at 15:47

## 2024-11-26 RX ADMIN — ACETAMINOPHEN, DIPHENHYDRAMINE HCL, PHENYLEPHRINE HCL 3 MILLIGRAM(S): 325; 25; 5 TABLET ORAL at 21:15

## 2024-11-26 RX ADMIN — PIPERACILLIN SODIUM AND TAZOBACTAM SODIUM 25 GRAM(S): 4; .5 INJECTION, POWDER, LYOPHILIZED, FOR SOLUTION INTRAVENOUS at 05:03

## 2024-11-26 RX ADMIN — Medication 65 MILLILITER(S): at 05:03

## 2024-11-26 RX ADMIN — Medication 2: at 23:19

## 2024-11-26 RX ADMIN — ACETAMINOPHEN 500MG 650 MILLIGRAM(S): 500 TABLET, COATED ORAL at 01:31

## 2024-11-26 RX ADMIN — ACETAMINOPHEN 500MG 650 MILLIGRAM(S): 500 TABLET, COATED ORAL at 21:20

## 2024-11-26 RX ADMIN — Medication 100 GRAM(S): at 10:58

## 2024-11-26 RX ADMIN — PIPERACILLIN SODIUM AND TAZOBACTAM SODIUM 25 GRAM(S): 4; .5 INJECTION, POWDER, LYOPHILIZED, FOR SOLUTION INTRAVENOUS at 21:14

## 2024-11-26 RX ADMIN — Medication 2: at 18:19

## 2024-11-26 RX ADMIN — ACETAMINOPHEN 500MG 650 MILLIGRAM(S): 500 TABLET, COATED ORAL at 22:20

## 2024-11-26 RX ADMIN — Medication 1: at 00:30

## 2024-11-26 RX ADMIN — PANTOPRAZOLE SODIUM 40 MILLIGRAM(S): 40 TABLET, DELAYED RELEASE ORAL at 05:04

## 2024-11-26 RX ADMIN — Medication 2: at 06:11

## 2024-11-26 RX ADMIN — Medication 10 MILLIGRAM(S): at 21:15

## 2024-11-26 RX ADMIN — ACETAMINOPHEN 500MG 650 MILLIGRAM(S): 500 TABLET, COATED ORAL at 00:31

## 2024-11-26 RX ADMIN — PANTOPRAZOLE SODIUM 40 MILLIGRAM(S): 40 TABLET, DELAYED RELEASE ORAL at 18:09

## 2024-11-26 RX ADMIN — POTASSIUM PHOSPHATE, MONOBASIC POTASSIUM PHOSPHATE, DIBASIC INJECTION, 62.5 MILLIMOLE(S): 236; 224 SOLUTION, CONCENTRATE INTRAVENOUS at 10:58

## 2024-11-26 RX ADMIN — GABAPENTIN 300 MILLIGRAM(S): 300 CAPSULE ORAL at 14:44

## 2024-11-26 NOTE — PROGRESS NOTE ADULT - ASSESSMENT
83yo, F, From [facility], located within [facility], usually lives at home with family, ambulates with a cane and PMH of DM, HTN, HLD and dementia (baseline AOx2-3).  Presents to the ED for fever and melena.  Admitted GI bleed and sepsis tuttle.

## 2024-11-26 NOTE — PROGRESS NOTE ADULT - ASSESSMENT
1. Melena  2. Anemia  3. Duodenal ulcer    Suggestions:    1. Monitor H/H  2. Transfuse PRBC as needed  3. Protonix IV  4. Avoid NSAID  5. EGD    patient must be transfer to another facility  6. Diet as tolerated  7. DVT prophylaxis

## 2024-11-26 NOTE — PROGRESS NOTE ADULT - SUBJECTIVE AND OBJECTIVE BOX
NP Note discussed with  primary attending    Patient is a 84y old  Female who presents with a chief complaint of Melena (26 Nov 2024 12:38)      INTERVAL HPI/OVERNIGHT EVENTS: Pt seen w/ dtr-Sury and Grandson-Abdirizak at bedside.  Family informed NP they would translate for pt.  Pt and dtr deny HA, dizziness, lightheadedness, SOB, CP or abdominal pain.  Pt and dtr deny BM today.       MEDICATIONS  (STANDING):  atorvastatin 10 milliGRAM(s) Oral at bedtime  gabapentin 300 milliGRAM(s) Oral daily  glucagon  Injectable 1 milliGRAM(s) IntraMuscular once  insulin lispro (ADMELOG) corrective regimen sliding scale   SubCutaneous every 6 hours  lactated ringers. 1000 milliLiter(s) (65 mL/Hr) IV Continuous <Continuous>  pantoprazole  Injectable 40 milliGRAM(s) IV Push every 12 hours  piperacillin/tazobactam IVPB.. 3.375 Gram(s) IV Intermittent every 8 hours    MEDICATIONS  (PRN):  acetaminophen     Tablet .. 650 milliGRAM(s) Oral every 6 hours PRN Temp greater or equal to 38C (100.4F), Mild Pain (1 - 3)  melatonin 3 milliGRAM(s) Oral at bedtime PRN Insomnia      __________________________________________________  REVIEW OF SYSTEMS:    CONSTITUTIONAL: No fever  EYES: No acute visual disturbances  NECK: No pain or stiffness  RESPIRATORY: No cough; No shortness of breath  CARDIOVASCULAR: No chest pain, no palpitations  GASTROINTESTINAL: No pain. No nausea or vomiting.  No diarrhea   NEUROLOGICAL: No headache or numbness, no tremors  MUSCULOSKELETAL: No joint pain, no muscle pain  GENITOURINARY: No dysuria, no frequency, no hesitancy  PSYCHIATRY: No depression , no anxiety  ALL OTHER  ROS negative        Vital Signs Last 24 Hrs  T(C): 37.1 (26 Nov 2024 05:33), Max: 37.1 (26 Nov 2024 05:33)  T(F): 98.8 (26 Nov 2024 05:33), Max: 98.8 (26 Nov 2024 05:33)  HR: 104 (26 Nov 2024 10:49) (88 - 104)  BP: 140/72 (26 Nov 2024 10:49) (132/76 - 148/79)  BP(mean): 92 (26 Nov 2024 10:49) (92 - 99)  RR: 18 (26 Nov 2024 05:33) (18 - 18)  SpO2: 99% (26 Nov 2024 10:49) (96% - 100%)    Parameters below as of 26 Nov 2024 10:49  Patient On (Oxygen Delivery Method): room air        ________________________________________________  PHYSICAL EXAM:  GENERAL: NAD  HEENT: Normocephalic;  conjunctivae and sclerae clear; moist mucous membranes   NECK : Supple  CHEST/LUNG: Clear to auscultation bilaterally with good air entry   HEART: S1 S2  regular; no murmurs, gallops or rubs  ABDOMEN: Soft, Nontender, Nondistended; Bowel sounds present x 4 quad  EXTREMITIES: No cyanosis; no edema; no calf tenderness  SKIN: Warm and dry; no rash  NERVOUS SYSTEM:  Awake and alert; Oriented to place, person and time; no new deficits    _________________________________________________  LABS:                        9.4    9.74  )-----------( 324      ( 26 Nov 2024 06:19 )             27.8     11-26    137  |  105  |  12  ----------------------------<  221[H]  3.6   |  24  |  0.41[L]    Ca    8.9      26 Nov 2024 06:19  Phos  2.3     11-26  Mg     1.6     11-26    TPro  6.0  /  Alb  2.1[L]  /  TBili  0.4  /  DBili  x   /  AST  25  /  ALT  65[H]  /  AlkPhos  68  11-25    PT/INR - ( 26 Nov 2024 06:19 )   PT: 22.6 sec;   INR: 1.96 ratio         PTT - ( 26 Nov 2024 06:19 )  PTT:28.2 sec  Urinalysis Basic - ( 26 Nov 2024 06:19 )    Color: x / Appearance: x / SG: x / pH: x  Gluc: 221 mg/dL / Ketone: x  / Bili: x / Urobili: x   Blood: x / Protein: x / Nitrite: x   Leuk Esterase: x / RBC: x / WBC x   Sq Epi: x / Non Sq Epi: x / Bacteria: x      CAPILLARY BLOOD GLUCOSE      POCT Blood Glucose.: 190 mg/dL (26 Nov 2024 11:30)  POCT Blood Glucose.: 224 mg/dL (26 Nov 2024 06:08)  POCT Blood Glucose.: 175 mg/dL (26 Nov 2024 00:04)  POCT Blood Glucose.: 206 mg/dL (25 Nov 2024 16:49)        RADIOLOGY & ADDITIONAL TESTS:    Imaging Personally Reviewed:  YES/NO    Consultant(s) Notes Reviewed:   YES/ No    Care Discussed with Consultants :     Plan of care was discussed with patient and /or primary care giver; all questions and concerns were addressed and care was aligned with patient's wishes.

## 2024-11-26 NOTE — CONSULT NOTE ADULT - NEGATIVE RESPIRATORY AND THORAX SYMPTOMS
no dyspnea/no cough/no pleuritic chest pain
no wheezing/no dyspnea/no cough/no hemoptysis/no pleuritic chest pain

## 2024-11-26 NOTE — CONSULT NOTE ADULT - RESPIRATORY
normal/clear to auscultation bilaterally/no wheezes/no rales/no rhonchi
clear to auscultation bilaterally/no wheezes/no rales/no rhonchi/no respiratory distress/no use of accessory muscles/no subcutaneous emphysema/airway patent/breath sounds equal/good air movement

## 2024-11-26 NOTE — CONSULT NOTE ADULT - CONSTITUTIONAL
Report from Patricia Atkins CRNA, see anesthesia record. ABD remains soft and non-tender post procedure. Pt has no complaints at this time and tolerated the procedure well. Endoscope was pre-cleaned at bedside immediately following procedure by Chetna Delgadillo.
well-groomed/no distress
well-groomed/no distress

## 2024-11-26 NOTE — PHYSICAL THERAPY INITIAL EVALUATION ADULT - LEVEL OF INDEPENDENCE: STAIR NEGOTIATION, REHAB EVAL
JYOTSNA, as the pt. presents with decreased strength, balance, and wanted to remain in her bedside chair.

## 2024-11-26 NOTE — PHYSICAL THERAPY INITIAL EVALUATION ADULT - GENERAL OBSERVATIONS, REHAB EVAL
Patient was received supine in bed, AAOX2, Maltese speaking, her daughter at her bedside. Patient was received supine in bed, AAOX2, [place] speaking, her daughter at her bedside and pt. preferred for her daughter to interpret.

## 2024-11-26 NOTE — PHYSICAL THERAPY INITIAL EVALUATION ADULT - LIVES WITH, PROFILE
Lives in a house with 5 steps to enter and additional 5 steps. The pt. has a HHA 6 hrs/5days/wk, and receives help from her familiy at home./children Lives in a house with 5 steps to enter and additional 5 steps. The pt. has a HHA 6 hrs/5days/wk, and receives help from her family at home./children

## 2024-11-26 NOTE — DISCHARGE NOTE PROVIDER - NSDCCPCAREPLAN_GEN_ALL_CORE_FT
PRINCIPAL DISCHARGE DIAGNOSIS  Diagnosis: Melanotic stools  Assessment and Plan of Treatment: You presented to the hospital for two episodes of melena at rehab.  You were noted to have been recently discharged from [facility] 11/18-11/23 for GI bleed and melena.  While in the hospital your blood count was monitored and remained stable and there were no obvious signs of bleeding.  While in the hospital you were seen by a Gastroenterologist and recommended an EGD......        SECONDARY DISCHARGE DIAGNOSES  Diagnosis: Fever  Assessment and Plan of Treatment: On hospital admission your were noted to have Temp 101F, HR 121BPM, WBC 14.8 concerning for sepsis.  While un the ED a code sepsis was initiated and you were treated w/ IV antibiotics......  Your chest xray and urinalysis were negative for infection.  Your blood culture......PENDING.  While in the hospital you were seen by an Infectious Disease doctor with a recommendation for.......      Diagnosis: Electrolyte imbalance  Assessment and Plan of Treatment: While in the hospital you were noted to have electrolyte abnormalities and received supplementation.  Please follow up with your PCP within one week for contunued evaluation and management.       Diagnosis: HTN (hypertension)  Assessment and Plan of Treatment: You have a history of hypertension.  While in the hospital your blood pressure medications were not given to your out of concern for sepsis.  Your blood pressure medications were resumed .........      Diagnosis: HLD (hyperlipidemia)  Assessment and Plan of Treatment: You have a history of hyperlipidemia for which you take Atorvastatin.  Please continue your home regimen.  Hyperlipidemia is a condition in which your blood has too many fat particles.  Hyperlipidemia can be caused by drinking a lot of alcohol, eating a lot of foods with trans or saturated fats, and smoking.  Hyperlipidemia affects your vessels in your body and can lead to atherosclerosis.  Hyperlipidemia is diagnosed by blood tests.  You have been prescribed medication to treat your hyperlipidemia.  Please take your medication as prescribed      Diagnosis: DM (diabetes mellitus)  Assessment and Plan of Treatment: You have a history of DM  for which you take Metformin & Mounjaro.  Please continue your home regimen.       PRINCIPAL DISCHARGE DIAGNOSIS  Diagnosis: Melanotic stools  Assessment and Plan of Treatment: You presented to the hospital for two episodes of melena at rehab.  You were noted to have been recently discharged from [facility] 11/18-11/23 for GI bleed and melena.  While in the hospital your blood count was monitored and remained stable and there were no obvious signs of bleeding.  While in the hospital you were seen by a Gastroenterologist and recommended an EGD which revealed an ulcer.  You were started on medication to prevent further ulcers.        SECONDARY DISCHARGE DIAGNOSES  Diagnosis: Fever  Assessment and Plan of Treatment: On hospital admission your were noted to have Temp 101F, HR 121BPM, WBC 14.8 concerning for sepsis.  While un the ED a code sepsis was initiated and you were treated w/ IV antibiotics......  Your chest xray and urinalysis were negative for infection.  Your blood culture were negative.  While in the hospital you were seen by an Infectious Disease doctor with a recommendation for IV antibiotics, which you have completed.    Diagnosis: Electrolyte imbalance  Assessment and Plan of Treatment: While in the hospital you were noted to have electrolyte abnormalities and received supplementation.  Please follow up with your PCP within one week for contunued evaluation and management.       Diagnosis: DM (diabetes mellitus)  Assessment and Plan of Treatment: You have a history of DM  for which you take Metformin & Mounjaro.  Please continue your home regimen.      Diagnosis: HTN (hypertension)  Assessment and Plan of Treatment: During the course of your hospital stay your blood pressure was checked regularly and did not warrant the medication regimen that you were on prior to your admission.  It is imperative that you follow up with your primary care provider regularly for blood pressure checks to determine the appopriate timing of resuming your home medications.    Diagnosis: HLD (hyperlipidemia)  Assessment and Plan of Treatment: You have a history of hyperlipidemia for which you take Atorvastatin.  Please continue your home regimen.  Hyperlipidemia is a condition in which your blood has too many fat particles.  Hyperlipidemia can be caused by drinking a lot of alcohol, eating a lot of foods with trans or saturated fats, and smoking.  Hyperlipidemia affects your vessels in your body and can lead to atherosclerosis.  Hyperlipidemia is diagnosed by blood tests.  You have been prescribed medication to treat your hyperlipidemia.  Please take your medication as prescribed

## 2024-11-26 NOTE — DISCHARGE NOTE PROVIDER - CARE PROVIDER_API CALL
Stefan Leone  Internal Medicine  [address], [address]  [place] [address]  Phone: [phone]  Fax: [phone]  Follow Up Time:

## 2024-11-26 NOTE — PHYSICAL THERAPY INITIAL EVALUATION ADULT - ADDITIONAL COMMENTS
Occasionally used a cane. Her daughter at her bedside reported that the pt. was previously independent with ambulation but began using a cane.

## 2024-11-26 NOTE — DISCHARGE NOTE PROVIDER - HOSPITAL COURSE
Admitted GI bleed and sepsis tuttle.     Patient is a 84 year old female, from [facility] usually lives at home, ambulates with a cane and PMHx significant for DM, HTN, HLD and dementia (baseline AOx2-3) presents to the ED for fever and melena after recently being admitted to [facility] for melena. Significant collateral is obtained from daughters at bedside. Report that patient had two episodes of dark black stools at rehab today. Unable to quantify as episode was not witness by daughters and patient is unable to recall. No bright red blood as per daughters. Patient also reports fever since the morning of 11/24/2024. She denies any other changes to bowel movements including hematochezia, diarrhea and constipation. She denies any changes in urination including hematuria. She denies, cough and congestion. Patient has no other complaints.    Admitted GI bleed and sepsis tuttle.            a/o 11/26     Patient is a 84 year old female, from [facility] usually lives at home, ambulates with a cane and PMHx significant for DM, HTN, HLD and dementia (baseline AOx2-3) presents to the ED for fever and melena after recently being admitted to [facility] for melena. Significant collateral is obtained from daughters at bedside. Report that patient had two episodes of dark black stools at rehab today. Unable to quantify as episode was not witness by daughters and patient is unable to recall. No bright red blood as per daughters. Patient also reports fever since the morning of 11/24/2024. She denies any other changes to bowel movements including hematochezia, diarrhea and constipation. She denies any changes in urination including hematuria. She denies, cough and congestion. Patient has no other complaints.    Admitted GI bleed and sepsis tuttle.      #Xray Chest 1 View-PORTABLE IMMEDIATE IMPRESSION:  No acute finding or change.    #UA did not yield reflex.    #BCx negative    ID treated with ABX empirically.  Leukocytosis and fevers resolved.    #EGD (11.29.24 @ 10:30) Impression:  - 2 cm clean based ulcer in duodenal sweep. No vessels seen.  - At least 2 healed ulcers seen in D2 and D3 area.  - 5 cm hiatal hernia    Hgb stable.  Diet resumed and tolerated.    Case discussed with attending.  Given patient's improved clinical status and current hemodynamic stability, the decision was made for discharge.    This is a summary of the patients hospitalization.  For full hospital course, please see medical record.

## 2024-11-26 NOTE — CONSULT NOTE ADULT - ASSESSMENT
Fevers - improved  Leukocytosis - normalized  No obvious source at this time.      Plan - Cont Zosyn Emperically for now. Fevers - improved  Leukocytosis - normalized  No obvious source at this time.      Plan - Cont Zosyn 3.375 gms iv q8hrs empirically for now.

## 2024-11-26 NOTE — CONSULT NOTE ADULT - SUBJECTIVE AND OBJECTIVE BOX
HPI:  Patient is a 84 year old female, from rehab, usually lives at home, ambulates with a cane and PMHx significant for DM, HTN, HLD and dementia (baseline AOx2-3) presents to the ED for fever and melena after recently being admitted to [facility] for melena. Significant collateral is obtained from daughters at bedside. Report that patient had two episodes of dark black stools at rehab today. Unable to quantify as episode was not witness by daughters and patient is unable to recall. No bright red blood as per daughters. Patient also reports fever since the morning of 11/24/2024. She denies any other changes to bowel movements including hematochezia, diarrhea and constipation. She denies any changes in urination including hematuria. She denies, cough and congestion. Patient has no other complaints. (25 Nov 2024 03:21)                PAST MEDICAL & SURGICAL HISTORY:  DM (diabetes mellitus)      HTN (hypertension)      HLD (hyperlipidemia)      No significant past surgical history          No Known Allergies      Meds:  acetaminophen     Tablet .. 650 milliGRAM(s) Oral every 6 hours PRN  atorvastatin 10 milliGRAM(s) Oral at bedtime  gabapentin 300 milliGRAM(s) Oral daily  glucagon  Injectable 1 milliGRAM(s) IntraMuscular once  insulin lispro (ADMELOG) corrective regimen sliding scale   SubCutaneous every 6 hours  melatonin 3 milliGRAM(s) Oral at bedtime PRN  pantoprazole  Injectable 40 milliGRAM(s) IV Push every 12 hours  piperacillin/tazobactam IVPB.. 3.375 Gram(s) IV Intermittent every 8 hours      SOCIAL HISTORY:  Smoker:  YES / NO        PACK YEARS:                         WHEN QUIT?  ETOH use:  YES / NO               FREQUENCY / QUANTITY:  Ilicit Drug use:  YES / NO  Occupation:  Assisted device use (Cane / Walker):  Live with:    FAMILY HISTORY:  FHx: heart disease        VITALS:  Vital Signs Last 24 Hrs  T(C): 37.2 (26 Nov 2024 13:17), Max: 37.2 (26 Nov 2024 13:17)  T(F): 99 (26 Nov 2024 13:17), Max: 99 (26 Nov 2024 13:17)  HR: 99 (26 Nov 2024 13:17) (88 - 104)  BP: 146/79 (26 Nov 2024 13:17) (139/78 - 148/79)  BP(mean): 92 (26 Nov 2024 10:49) (92 - 99)  RR: 18 (26 Nov 2024 13:17) (18 - 18)  SpO2: 99% (26 Nov 2024 13:17) (96% - 99%)    Parameters below as of 26 Nov 2024 13:17  Patient On (Oxygen Delivery Method): room air        LABS/DIAGNOSTIC TESTS:                          9.4    9.74  )-----------( 324      ( 26 Nov 2024 06:19 )             27.8     WBC Count: 9.74 K/uL (11-26 @ 06:19)  WBC Count: 9.37 K/uL (11-25 @ 17:25)  WBC Count: 10.34 K/uL (11-25 @ 07:22)  WBC Count: 14.83 K/uL (11-24 @ 21:25)      11-26    137  |  105  |  12  ----------------------------<  221[H]  3.6   |  24  |  0.41[L]    Ca    8.9      26 Nov 2024 06:19  Phos  2.3     11-26  Mg     1.6     11-26    TPro  6.0  /  Alb  2.1[L]  /  TBili  0.4  /  DBili  x   /  AST  25  /  ALT  65[H]  /  AlkPhos  68  11-25      Urine Microscopic-Add On (NC) (11.25.24 @ 02:02)   White Blood Cell - Urine: 0 /HPF  Red Blood Cell - Urine: 3 /HPF  Bacteria: Few /HPF  Comment - Urine: few budding yeast seen.  Squamous Epithelial Cells: PresentUrinalysis with Rflx Culture (11.25.24 @ 02:02)   Urine Appearance: Clear  Color: Yellow  Specific Gravity: 1.030  pH Urine: 5.5  Protein, Urine: 30 mg/dL  Glucose Qualitative, Urine: >=1000 mg/dL  Ketone - Urine: 15 mg/dL  Blood, Urine: Trace  Bilirubin: Negative  Urobilinogen: 0.2 mg/dL  Leukocyte Esterase Concentration: Negative  Nitrite: Negative      LIVER FUNCTIONS - ( 25 Nov 2024 07:22 )  Alb: 2.1 g/dL / Pro: 6.0 g/dL / ALK PHOS: 68 U/L / ALT: 65 U/L DA / AST: 25 U/L / GGT: x             PT/INR - ( 26 Nov 2024 06:19 )   PT: 22.6 sec;   INR: 1.96 ratio         PTT - ( 26 Nov 2024 06:19 )  PTT:28.2 sec    LACTATE:    ABG -     CULTURES:   .Blood BLOOD  11-24 @ 21:05   No growth at 24 hours  --  --      .Blood BLOOD  11-24 @ 20:55   No growth at 24 hours  --  --            RADIOLOGY:< from: Xray Chest 1 View-PORTABLE IMMEDIATE (11.24.24 @ 22:15) >    ACC: [id number] EXAM:  XR CHEST PORTABLE IMMED 1V   ORDERED BY: ZAY JENKINS     PROCEDURE DATE:  11/24/2024          INTERPRETATION:  AP supine chest on November 24, 2024 at 9:54 PM. Patient   has sepsis.    Elevated diaphragms crowds the chest.    Heart normal for projection.    Quite advanced bilateral shoulder degeneration again noted.    Lungs are grossly clear and chest is similar to November 18 this year.    IMPRESSION: No acute finding or change.    --- End of Report ---            LYDIA JACOB MD; Attending Radiologist  This document has been electronically signed. Nov 25 2024 10:08AM    < end of copied text >        ROS  [  ] UNABLE TO ELICIT               HPI:  Patient is a 84 year old female, from rehab, usually lives at home, ambulates with a cane and PMHx significant for DM, HTN, HLD and dementia (baseline AOx2-3) presents to the ED for fever and melena after recently being admitted to [facility] for melena. Significant collateral is obtained from daughters at bedside. Report that patient had two episodes of dark black stools at rehab today. Unable to quantify as episode was not witness by daughters and patient is unable to recall. No bright red blood as per daughters. Patient also reports fever since the morning of 11/24/2024. She denies any other changes to bowel movements including hematochezia, diarrhea and constipation. She denies any changes in urination including hematuria. She denies, cough and congestion. Patient has no other complaints. (25 Nov 2024 03:21)        History as above, asked to see this patient who was recently here and was sent to a rehab but was sent in to the hospital as she had some melanotic stool and fevers. She was found to have Leukocytosis here as well. She is awake and alert and answering most questions appropriately, she has no nausea, vomiting or urinary symptoms but has some mild abdominal pain at times and is c/o pain in her hands, she is denying any coughing or SOB, no chest pains either. She is mildly confused but answering most questions appropriately. She appears to be in no distress at all at this time. She has been started on Zosyn empirically, her fevers have come down and her WBC count has normalized. Her CXR shows no infiltrates , her u/a is negative and her blood cultures are negative also.        PAST MEDICAL & SURGICAL HISTORY:  DM (diabetes mellitus)      HTN (hypertension)      HLD (hyperlipidemia)      No significant past surgical history          No Known Allergies      Meds:  acetaminophen     Tablet .. 650 milliGRAM(s) Oral every 6 hours PRN  atorvastatin 10 milliGRAM(s) Oral at bedtime  gabapentin 300 milliGRAM(s) Oral daily  glucagon  Injectable 1 milliGRAM(s) IntraMuscular once  insulin lispro (ADMELOG) corrective regimen sliding scale   SubCutaneous every 6 hours  melatonin 3 milliGRAM(s) Oral at bedtime PRN  pantoprazole  Injectable 40 milliGRAM(s) IV Push every 12 hours  piperacillin/tazobactam IVPB.. 3.375 Gram(s) IV Intermittent every 8 hours      SOCIAL HISTORY:  Smoker:  no  ETOH use:  no    FAMILY HISTORY:  FHx: heart disease        VITALS:  Vital Signs Last 24 Hrs  T(C): 37.2 (26 Nov 2024 13:17), Max: 37.2 (26 Nov 2024 13:17)  T(F): 99 (26 Nov 2024 13:17), Max: 99 (26 Nov 2024 13:17)  HR: 99 (26 Nov 2024 13:17) (88 - 104)  BP: 146/79 (26 Nov 2024 13:17) (139/78 - 148/79)  BP(mean): 92 (26 Nov 2024 10:49) (92 - 99)  RR: 18 (26 Nov 2024 13:17) (18 - 18)  SpO2: 99% (26 Nov 2024 13:17) (96% - 99%)    Parameters below as of 26 Nov 2024 13:17  Patient On (Oxygen Delivery Method): room air        LABS/DIAGNOSTIC TESTS:                          9.4    9.74  )-----------( 324      ( 26 Nov 2024 06:19 )             27.8     WBC Count: 9.74 K/uL (11-26 @ 06:19)  WBC Count: 9.37 K/uL (11-25 @ 17:25)  WBC Count: 10.34 K/uL (11-25 @ 07:22)  WBC Count: 14.83 K/uL (11-24 @ 21:25)      11-26    137  |  105  |  12  ----------------------------<  221[H]  3.6   |  24  |  0.41[L]    Ca    8.9      26 Nov 2024 06:19  Phos  2.3     11-26  Mg     1.6     11-26    TPro  6.0  /  Alb  2.1[L]  /  TBili  0.4  /  DBili  x   /  AST  25  /  ALT  65[H]  /  AlkPhos  68  11-25      Urine Microscopic-Add On (NC) (11.25.24 @ 02:02)   White Blood Cell - Urine: 0 /HPF  Red Blood Cell - Urine: 3 /HPF  Bacteria: Few /HPF  Comment - Urine: few budding yeast seen.  Squamous Epithelial Cells: PresentUrinalysis with Rflx Culture (11.25.24 @ 02:02)   Urine Appearance: Clear  Color: Yellow  Specific Gravity: 1.030  pH Urine: 5.5  Protein, Urine: 30 mg/dL  Glucose Qualitative, Urine: >=1000 mg/dL  Ketone - Urine: 15 mg/dL  Blood, Urine: Trace  Bilirubin: Negative  Urobilinogen: 0.2 mg/dL  Leukocyte Esterase Concentration: Negative  Nitrite: Negative      LIVER FUNCTIONS - ( 25 Nov 2024 07:22 )  Alb: 2.1 g/dL / Pro: 6.0 g/dL / ALK PHOS: 68 U/L / ALT: 65 U/L DA / AST: 25 U/L / GGT: x             PT/INR - ( 26 Nov 2024 06:19 )   PT: 22.6 sec;   INR: 1.96 ratio         PTT - ( 26 Nov 2024 06:19 )  PTT:28.2 sec    LACTATE:    ABG -     CULTURES:   .Blood BLOOD  11-24 @ 21:05   No growth at 24 hours  --  --      .Blood BLOOD  11-24 @ 20:55   No growth at 24 hours  --  --            RADIOLOGY:< from: Xray Chest 1 View-PORTABLE IMMEDIATE (11.24.24 @ 22:15) >    ACC: [id number] EXAM:  XR CHEST PORTABLE IMMED 1V   ORDERED BY: ZAY JENKINS     PROCEDURE DATE:  11/24/2024          INTERPRETATION:  AP supine chest on November 24, 2024 at 9:54 PM. Patient   has sepsis.    Elevated diaphragms crowds the chest.    Heart normal for projection.    Quite advanced bilateral shoulder degeneration again noted.    Lungs are grossly clear and chest is similar to November 18 this year.    IMPRESSION: No acute finding or change.    --- End of Report ---            LYDIA JACOB MD; Attending Radiologist  This document has been electronically signed. Nov 25 2024 10:08AM    < end of copied text >        ROS  [  ] UNABLE TO ELICIT

## 2024-11-26 NOTE — PROGRESS NOTE ADULT - SUBJECTIVE AND OBJECTIVE BOX
[   ] ICU                                          [   ] CCU                                      [ X  ] Medical Floor    Patient is a 84 year old female with GI bleeding and anemia. GI consulted to evaluate.      Patient is a 84 year old female, from rehab, usually lives at home, ambulates with a cane with past medical history significant for DM, HTN, Hyperlipidemia and dementia presented to the emergency room with few episodes of melena and fever. patient recently had EGD for melena where she was found to have duodenal ulcer. No abdominal pain, nausea, vomiting, hematemesis, hematochezia, hemoptysis, chest pain, SOB, cough, hematuria, dysuria or diarrhea reported.    Patient is comfortable. No new complaints reported, No abdominal pain, N/V, hematemesis, hematochezia, melena, fever, chills, chest pain, SOB, cough or diarrhea reported.      PAIN MANAGEMENT:  Pain Scale:                0 /10  Pain Location:         PAST MEDICAL HISTORY    DM (diabetes mellitus)    HTN (hypertension)    Hyperlipidemia    Dementia        PAST SURGICAL HISTORY    No significant past surgical history reported        Allergies    No Known Allergies    Intolerances  None         SOCIAL HISTORY  Advanced Directives:       [ X ] Full Code       [  ] DNR  Marital Status:         [  ] M      [ X ] S      [  ] D       [  ] W  Children:       [ X ] Yes      [  ] No  Occupation:        [  ] Employed       [ X ] Unemployed       [  ] Retired  Diet:       [ X ] Regular       [  ] PEG feeding          [  ] NG tube feeding  Drug Use:           [ X ] No           [  ] Yes  Alcohol:           [X  ] No             [  ] Yes (socially)         [  ] Yes (chronic)  Tobacco:           [  ] Yes           [ X ] No      FAMILY HISTORY  [ X ] Heart Disease            [ X ] Diabetes             [ X ] HTN             [  ] Colon Cancer             [  ] Stomach Cancer              [  ] Pancreatic Cancer          VITALS   Vital Signs Last 24 Hrs  T(C): 37.1 (11-26-24 @ 05:33), Max: 37.1 (11-26-24 @ 05:33)  T(F): 98.8 (11-26-24 @ 05:33), Max: 98.8 (11-26-24 @ 05:33)  HR: 104 (11-26-24 @ 10:49) (88 - 104)  BP: 140/72 (11-26-24 @ 10:49) (132/76 - 148/79)  BP(mean): 92 (11-26-24 @ 10:49) (92 - 99)  RR: 18 (11-26-24 @ 05:33) (18 - 18)  SpO2: 99% (11-26-24 @ 10:49) (96% - 100%)        MEDICATIONS  (STANDING):  atorvastatin 10 milliGRAM(s) Oral at bedtime  gabapentin 300 milliGRAM(s) Oral daily  glucagon  Injectable 1 milliGRAM(s) IntraMuscular once  insulin lispro (ADMELOG) corrective regimen sliding scale   SubCutaneous every 6 hours  lactated ringers. 1000 milliLiter(s) (65 mL/Hr) IV Continuous <Continuous>  pantoprazole  Injectable 40 milliGRAM(s) IV Push every 12 hours  piperacillin/tazobactam IVPB.. 3.375 Gram(s) IV Intermittent every 8 hours    MEDICATIONS  (PRN):  acetaminophen     Tablet .. 650 milliGRAM(s) Oral every 6 hours PRN Temp greater or equal to 38C (100.4F), Mild Pain (1 - 3)  melatonin 3 milliGRAM(s) Oral at bedtime PRN Insomnia                            9.4    9.74  )-----------( 324      ( 26 Nov 2024 06:19 )             27.8       11-26    137  |  105  |  12  ----------------------------<  221[H]  3.6   |  24  |  0.41[L]    Ca    8.9      26 Nov 2024 06:19  Phos  2.3     11-26  Mg     1.6     11-26    TPro  6.0  /  Alb  2.1[L]  /  TBili  0.4  /  DBili  x   /  AST  25  /  ALT  65[H]  /  AlkPhos  68  11-25      PT/INR - ( 26 Nov 2024 06:19 )   PT: 22.6 sec;   INR: 1.96 ratio         PTT - ( 26 Nov 2024 06:19 )  PTT:28.2 sec

## 2024-11-26 NOTE — PROGRESS NOTE ADULT - SUBJECTIVE AND OBJECTIVE BOX
INTERVAL HPI/OVERNIGHT EVENTS:  Patient seen,no acute events  VITAL SIGNS:  T(F): 98.8 (11-26-24 @ 05:33)  HR: 95 (11-26-24 @ 05:33)  BP: 139/78 (11-26-24 @ 05:33)  RR: 18 (11-26-24 @ 05:33)  SpO2: 97% (11-26-24 @ 05:33)  Wt(kg): --    PHYSICAL EXAM:  awake,some confusion  Constitutional:  Eyes:  ENMT:perrla  Neck:  Respiratory:clear  Cardiovascular:s1s2,m-none  Gastrointestinal:soft,bs pos  Extremities:  Vascular:  Neurological:no focal deficit  Musculoskeletal:    MEDICATIONS  (STANDING):  atorvastatin 10 milliGRAM(s) Oral at bedtime  gabapentin 300 milliGRAM(s) Oral daily  glucagon  Injectable 1 milliGRAM(s) IntraMuscular once  insulin lispro (ADMELOG) corrective regimen sliding scale   SubCutaneous every 6 hours  lactated ringers. 1000 milliLiter(s) (65 mL/Hr) IV Continuous <Continuous>  magnesium sulfate  IVPB 1 Gram(s) IV Intermittent once  pantoprazole  Injectable 40 milliGRAM(s) IV Push every 12 hours  piperacillin/tazobactam IVPB.. 3.375 Gram(s) IV Intermittent every 8 hours  potassium phosphate IVPB 15 milliMole(s) IV Intermittent once    MEDICATIONS  (PRN):  acetaminophen     Tablet .. 650 milliGRAM(s) Oral every 6 hours PRN Temp greater or equal to 38C (100.4F), Mild Pain (1 - 3)  melatonin 3 milliGRAM(s) Oral at bedtime PRN Insomnia      Allergies    No Known Allergies    Intolerances        LABS:                        9.4    9.74  )-----------( 324      ( 26 Nov 2024 06:19 )             27.8     11-26    137  |  105  |  12  ----------------------------<  221[H]  3.6   |  24  |  0.41[L]    Ca    8.9      26 Nov 2024 06:19  Phos  2.3     11-26  Mg     1.6     11-26    TPro  6.0  /  Alb  2.1[L]  /  TBili  0.4  /  DBili  x   /  AST  25  /  ALT  65[H]  /  AlkPhos  68  11-25    PT/INR - ( 26 Nov 2024 06:19 )   PT: 22.6 sec;   INR: 1.96 ratio         PTT - ( 26 Nov 2024 06:19 )  PTT:28.2 sec  Urinalysis Basic - ( 26 Nov 2024 06:19 )    Color: x / Appearance: x / SG: x / pH: x  Gluc: 221 mg/dL / Ketone: x  / Bili: x / Urobili: x   Blood: x / Protein: x / Nitrite: x   Leuk Esterase: x / RBC: x / WBC x   Sq Epi: x / Non Sq Epi: x / Bacteria: x        RADIOLOGY & ADDITIONAL TESTS:      ASSESSMENT:  85yo, F, From [facility], usually lives at home with family, ambulates with a cane and PMH of DM, HTN, HLD and dementia (baseline AOx2-3).  Presents to the ED for fever and melena.  Admitted GI bleed and sepsis tuttle.           Problem/Plan - 1:  ·  Problem: Melena.   ·  Plan:   - P/w two episodes of melena at rehab.  [facility] dc'd [facility] 11/18-11/23 for GI bleed and melena  - Continue to monitor CBC.  Planning for PM CBC-f/u results   - Maintain active T&S  - NPO  - C/w Protonix BID  - GI-Dr. Glass planning for EGD today     Problem/Plan - 2:  ·  Problem: Sepsis on admittion-stable  clinically  ·  Plan:   - Currently on Zosyn.  ? ID-f/u   - Bld cx's pending-f/u results   - Currently on IVF      Problem/Plan - 3:  ·  Problem: DM (diabetes mellitus).   ·  Plan:  H/o DM on Metformin & Mounjaro  - C/w HSS      Problem/Plan - 4:  ·  Problem: HTN (hypertension).   ·  Plan:   - Currently holding BP meds iso possible GI bleed  - Continue to monitor BP and adjust medication accordingly      Problem/Plan - 5:  ·  Problem: HLD (hyperlipidemia).   ·  Plan:   - C/w Atorvastatin    Problem/Plan - 6:  ·  Problem: Electrolyte Abnormalities.   ·  Plan:  - Supplemented today  - Continue to monitor      Problem/Plan - 7:  ·  Problem: Prophylactic measure.   ·  Plan:   - C/w SCDs   - C/w Protonix BID.

## 2024-11-26 NOTE — CONSULT NOTE ADULT - NECK
Last visit:04/25/2018  Last Med refill:09/17/2018    Next Visit Date:  No future appointments.     Health Maintenance   Topic Date Due    Hepatitis C screen  1964    HIV screen  06/12/1977    DTaP/Tdap/Td vaccine (1 - Tdap) 06/12/1981    Pneumococcal med risk (1 of 1 - PPSV23) 06/12/1981    Cervical cancer screen  06/12/1983    Lipid screen  06/12/2002    Diabetes screen  06/12/2002    Potassium monitoring  03/13/2018    Creatinine monitoring  03/13/2018    Colon Cancer Screen FIT/FOBT  05/05/2018    Flu vaccine (1) 09/01/2018    Shingles Vaccine (1 of 2 - 2 Dose Series) 04/25/2019 (Originally 6/12/2012)    Breast cancer screen  03/27/2019       No results found for: LABA1C          ( goal A1C is < 7)   No results found for: LABMICR  No results found for: LDLCHOLESTEROL, LDLCALC    (goal LDL is <100)   No results found for: AST, ALT, BUN  BP Readings from Last 3 Encounters:   04/25/18 115/75   10/11/17 104/65   06/30/17 130/80          (goal 120/80)    All Future Testing planned in [facility]  Lab Frequency Next Occurrence               Patient Active Problem List:     Non-ST elevation (NSTEMI) myocardial infarction (HCC)     Chronic low back pain     Positive FIT (fecal immunochemical test)     Anxiety     Constipation     Atherosclerotic heart disease of native coronary artery without angina pectoris     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
supple/symmetric/no tracheal deviation
supple

## 2024-11-26 NOTE — PHYSICAL THERAPY INITIAL EVALUATION ADULT - PERTINENT HX OF CURRENT PROBLEM, REHAB EVAL
Patient was admitted to the hospital 1-day ago (11/25/2024) for Melena. PMHx significant for DM, HTN, HLD and dementia, recently being admitted to [facility] for melena

## 2024-11-26 NOTE — DISCHARGE NOTE PROVIDER - NSDCMRMEDTOKEN_GEN_ALL_CORE_FT
atorvastatin 10 mg oral tablet: 1 tab(s) orally once a day (at bedtime)  enalapril 2.5 mg oral tablet: 1 tab(s) orally once a day  gabapentin 300 mg oral tablet: 1 tab(s) orally once a day  metFORMIN 1000 mg oral tablet, extended release: 1 tab(s) orally once a day  metoprolol tartrate 50 mg oral tablet: 1 tab(s) orally 2 times a day  Mounjaro 5 mg/0.5 mL subcutaneous solution: 5 milligram(s) subcutaneously every 7 days  pantoprazole 40 mg oral delayed release tablet: 1 tab(s) orally 2 times a day  Tresiba FlexTouch 100 units/mL subcutaneous solution: 15 milliliter(s) subcutaneous once a day   acetaminophen 325 mg oral tablet: 2 tab(s) orally every 6 hours As needed Temp greater or equal to 38C (100.4F), Mild Pain (1 - 3)  atorvastatin 10 mg oral tablet: 1 tab(s) orally once a day (at bedtime)  gabapentin 300 mg oral capsule: 1 cap(s) orally once a day  insulin glargine 100 units/mL subcutaneous solution: 5 unit(s) subcutaneous once a day (at bedtime)  metoprolol tartrate 50 mg oral tablet: 1 tab(s) orally every 12 hours  pantoprazole 40 mg oral delayed release tablet: 1 tab(s) orally every 12 hours

## 2024-11-26 NOTE — CONSULT NOTE ADULT - NEGATIVE GASTROINTESTINAL SYMPTOMS
no nausea/no vomiting/no diarrhea/no abdominal pain/no hematochezia/no steatorrhea/no jaundice/no hiccoughs
no nausea/no vomiting/no diarrhea/no hematochezia

## 2024-11-26 NOTE — PHYSICAL THERAPY INITIAL EVALUATION ADULT - DIAGNOSIS, PT EVAL
Patient was admitted to the hospital 1-day ago (11/25/2024) for Melena. (ICF Model) Pt. present w/deficits in Body Structures/Function (Impairments), incl: Strength, Balance, Endurance leading to deficits in performing the below noted Activities (Limitations).

## 2024-11-27 LAB
ALBUMIN SERPL ELPH-MCNC: 1.8 G/DL — LOW (ref 3.5–5)
ALP SERPL-CCNC: 71 U/L — SIGNIFICANT CHANGE UP (ref 40–120)
ALT FLD-CCNC: 67 U/L DA — HIGH (ref 10–60)
ANION GAP SERPL CALC-SCNC: 8 MMOL/L — SIGNIFICANT CHANGE UP (ref 5–17)
AST SERPL-CCNC: 50 U/L — HIGH (ref 10–40)
BILIRUB SERPL-MCNC: 0.4 MG/DL — SIGNIFICANT CHANGE UP (ref 0.2–1.2)
BUN SERPL-MCNC: 11 MG/DL — SIGNIFICANT CHANGE UP (ref 7–18)
CALCIUM SERPL-MCNC: 8.8 MG/DL — SIGNIFICANT CHANGE UP (ref 8.4–10.5)
CHLORIDE SERPL-SCNC: 106 MMOL/L — SIGNIFICANT CHANGE UP (ref 96–108)
CO2 SERPL-SCNC: 25 MMOL/L — SIGNIFICANT CHANGE UP (ref 22–31)
CREAT SERPL-MCNC: 0.4 MG/DL — LOW (ref 0.5–1.3)
EGFR: 96 ML/MIN/1.73M2 — SIGNIFICANT CHANGE UP
GLUCOSE BLDC GLUCOMTR-MCNC: 218 MG/DL — HIGH (ref 70–99)
GLUCOSE BLDC GLUCOMTR-MCNC: 233 MG/DL — HIGH (ref 70–99)
GLUCOSE BLDC GLUCOMTR-MCNC: 240 MG/DL — HIGH (ref 70–99)
GLUCOSE BLDC GLUCOMTR-MCNC: 274 MG/DL — HIGH (ref 70–99)
GLUCOSE SERPL-MCNC: 200 MG/DL — HIGH (ref 70–99)
HCT VFR BLD CALC: 29.6 % — LOW (ref 34.5–45)
HGB BLD-MCNC: 10 G/DL — LOW (ref 11.5–15.5)
MAGNESIUM SERPL-MCNC: 1.6 MG/DL — SIGNIFICANT CHANGE UP (ref 1.6–2.6)
MCHC RBC-ENTMCNC: 30.2 PG — SIGNIFICANT CHANGE UP (ref 27–34)
MCHC RBC-ENTMCNC: 33.8 G/DL — SIGNIFICANT CHANGE UP (ref 32–36)
MCV RBC AUTO: 89.4 FL — SIGNIFICANT CHANGE UP (ref 80–100)
NRBC # BLD: 0 /100 WBCS — SIGNIFICANT CHANGE UP (ref 0–0)
PHOSPHATE SERPL-MCNC: 3.1 MG/DL — SIGNIFICANT CHANGE UP (ref 2.5–4.5)
PLATELET # BLD AUTO: 268 K/UL — SIGNIFICANT CHANGE UP (ref 150–400)
POTASSIUM SERPL-MCNC: 3.9 MMOL/L — SIGNIFICANT CHANGE UP (ref 3.5–5.3)
POTASSIUM SERPL-SCNC: 3.9 MMOL/L — SIGNIFICANT CHANGE UP (ref 3.5–5.3)
PROT SERPL-MCNC: 5.7 G/DL — LOW (ref 6–8.3)
RBC # BLD: 3.31 M/UL — LOW (ref 3.8–5.2)
RBC # FLD: 13.9 % — SIGNIFICANT CHANGE UP (ref 10.3–14.5)
SODIUM SERPL-SCNC: 139 MMOL/L — SIGNIFICANT CHANGE UP (ref 135–145)
WBC # BLD: 7.61 K/UL — SIGNIFICANT CHANGE UP (ref 3.8–10.5)
WBC # FLD AUTO: 7.61 K/UL — SIGNIFICANT CHANGE UP (ref 3.8–10.5)

## 2024-11-27 RX ADMIN — PANTOPRAZOLE SODIUM 40 MILLIGRAM(S): 40 TABLET, DELAYED RELEASE ORAL at 17:14

## 2024-11-27 RX ADMIN — Medication 2: at 11:49

## 2024-11-27 RX ADMIN — PIPERACILLIN SODIUM AND TAZOBACTAM SODIUM 25 GRAM(S): 4; .5 INJECTION, POWDER, LYOPHILIZED, FOR SOLUTION INTRAVENOUS at 21:16

## 2024-11-27 RX ADMIN — Medication 2: at 17:12

## 2024-11-27 RX ADMIN — Medication 3: at 22:27

## 2024-11-27 RX ADMIN — GABAPENTIN 300 MILLIGRAM(S): 300 CAPSULE ORAL at 12:52

## 2024-11-27 RX ADMIN — PIPERACILLIN SODIUM AND TAZOBACTAM SODIUM 25 GRAM(S): 4; .5 INJECTION, POWDER, LYOPHILIZED, FOR SOLUTION INTRAVENOUS at 05:04

## 2024-11-27 RX ADMIN — Medication 10 MILLIGRAM(S): at 21:16

## 2024-11-27 RX ADMIN — ACETAMINOPHEN, DIPHENHYDRAMINE HCL, PHENYLEPHRINE HCL 3 MILLIGRAM(S): 325; 25; 5 TABLET ORAL at 21:16

## 2024-11-27 RX ADMIN — Medication 2: at 08:18

## 2024-11-27 RX ADMIN — PANTOPRAZOLE SODIUM 40 MILLIGRAM(S): 40 TABLET, DELAYED RELEASE ORAL at 05:05

## 2024-11-27 RX ADMIN — PIPERACILLIN SODIUM AND TAZOBACTAM SODIUM 25 GRAM(S): 4; .5 INJECTION, POWDER, LYOPHILIZED, FOR SOLUTION INTRAVENOUS at 13:35

## 2024-11-27 NOTE — PROGRESS NOTE ADULT - ASSESSMENT
Pt is a 85 y/o F, From [facility], usually lives at home with family, ambulates with a cane and PMH of DM, HTN, HLD and dementia (baseline AOx2-3).  Presents to the ED for fever and melena.  Admitted GI bleed and sepsis tuttle.  Currently on Zosyn empirically with ID following. Labs wnl w/o leukocytosis.  GI following and planning for EGD.

## 2024-11-27 NOTE — PROGRESS NOTE ADULT - SUBJECTIVE AND OBJECTIVE BOX
INTERVAL HPI/OVERNIGHT EVENTS:  Patient seen,stable ,no acute issues  VITAL SIGNS:  T(F): 98.4 (11-27-24 @ 05:00)  HR: 87 (11-27-24 @ 05:00)  BP: 138/80 (11-27-24 @ 05:00)  RR: 18 (11-27-24 @ 05:00)  SpO2: 100% (11-27-24 @ 05:00)  Wt(kg): --    PHYSICAL EXAM:  awake  Constitutional:  Eyes:  ENMT:perrla  Neck:  Respiratory:clear  Cardiovascular:s1s2,m-none  Gastrointestinal:soft,bs pos  Extremities:  Vascular:  Neurological:no focal deficit  Musculoskeletal:    MEDICATIONS  (STANDING):  atorvastatin 10 milliGRAM(s) Oral at bedtime  gabapentin 300 milliGRAM(s) Oral daily  glucagon  Injectable 1 milliGRAM(s) IntraMuscular once  insulin lispro (ADMELOG) corrective regimen sliding scale   SubCutaneous Before meals and at bedtime  pantoprazole  Injectable 40 milliGRAM(s) IV Push every 12 hours  piperacillin/tazobactam IVPB.. 3.375 Gram(s) IV Intermittent every 8 hours    MEDICATIONS  (PRN):  acetaminophen     Tablet .. 650 milliGRAM(s) Oral every 6 hours PRN Temp greater or equal to 38C (100.4F), Mild Pain (1 - 3)  melatonin 3 milliGRAM(s) Oral at bedtime PRN Insomnia      Allergies    No Known Allergies    Intolerances        LABS:                        10.0   7.61  )-----------( 268      ( 27 Nov 2024 05:59 )             29.6     11-27    139  |  106  |  11  ----------------------------<  200[H]  3.9   |  25  |  0.40[L]    Ca    8.8      27 Nov 2024 05:59  Phos  3.1     11-27  Mg     1.6     11-27    TPro  5.7[L]  /  Alb  1.8[L]  /  TBili  0.4  /  DBili  x   /  AST  50[H]  /  ALT  67[H]  /  AlkPhos  71  11-27    PT/INR - ( 26 Nov 2024 06:19 )   PT: 22.6 sec;   INR: 1.96 ratio         PTT - ( 26 Nov 2024 06:19 )  PTT:28.2 sec  Urinalysis Basic - ( 27 Nov 2024 05:59 )    Color: x / Appearance: x / SG: x / pH: x  Gluc: 200 mg/dL / Ketone: x  / Bili: x / Urobili: x   Blood: x / Protein: x / Nitrite: x   Leuk Esterase: x / RBC: x / WBC x   Sq Epi: x / Non Sq Epi: x / Bacteria: x        RADIOLOGY & ADDITIONAL TESTS:      ASSESSMENT:  85yo, F, From [facility], usually lives at home with family, ambulates with a cane and PMH of DM, HTN, HLD and dementia (baseline AOx2-3).  Presents to the ED for fever and melena.  Admitted GI bleed and sepsis tuttle.           Problem/Plan - 1:  ·  Problem: Melena-stable hemodinamicly   ·  Plan:   - Continue to monitor CBC.  Planning for PM CBC-f/u results   - Maintain active T&S  - C/w Protonix BID  - GI-Dr. Glass planning for EGD on friday     Problem/Plan - 2:  ·  Problem: Sepsis on admittion-stable  clinically  ·  Plan:   - Currently on Zosyn.  ? ID-f/u   - Bld cx's pending-f/u results   - Currently on IVF      Problem/Plan - 3:  ·  Problem: DM (diabetes mellitus).   ·  Plan:  H/o DM on Metformin & Mounjaro  - C/w HSS      Problem/Plan - 4:  ·  Problem: HTN (hypertension).   ·  Plan:   - Currently holding BP meds iso possible GI bleed  - Continue to monitor BP and adjust medication accordingly      Problem/Plan - 5:  ·  Problem: HLD (hyperlipidemia).   ·  Plan:   - C/w Atorvastatin    Problem/Plan - 6:  ·  Problem: Electrolyte Abnormalities.   ·  Plan:  - Supplemented today  - Continue to monitor      Problem/Plan - 7:  ·  Problem: Prophylactic measure.   ·  Plan:   - C/w SCDs   - C/w Protonix BID.

## 2024-11-27 NOTE — PROGRESS NOTE ADULT - SUBJECTIVE AND OBJECTIVE BOX
NP Note discussed with  Primary Attending    Patient is a 84y old  Female who presents with a chief complaint of Melena (27 Nov 2024 11:20AM)      INTERVAL HPI/OVERNIGHT EVENTS: no new complaints, daughter Sury at bedside at time of eval. Liberian speaking.     MEDICATIONS  (STANDING):  atorvastatin 10 milliGRAM(s) Oral at bedtime  gabapentin 300 milliGRAM(s) Oral daily  glucagon  Injectable 1 milliGRAM(s) IntraMuscular once  insulin lispro (ADMELOG) corrective regimen sliding scale   SubCutaneous Before meals and at bedtime  pantoprazole  Injectable 40 milliGRAM(s) IV Push every 12 hours  piperacillin/tazobactam IVPB.. 3.375 Gram(s) IV Intermittent every 8 hours    MEDICATIONS  (PRN):  acetaminophen     Tablet .. 650 milliGRAM(s) Oral every 6 hours PRN Temp greater or equal to 38C (100.4F), Mild Pain (1 - 3)  melatonin 3 milliGRAM(s) Oral at bedtime PRN Insomnia      __________________________________________________  REVIEW OF SYSTEMS:    CONSTITUTIONAL: No fever,   EYES: no acute visual disturbances  NECK: No pain or stiffness  RESPIRATORY: No cough; No shortness of breath  CARDIOVASCULAR: No chest pain, no palpitations  GASTROINTESTINAL: No pain. No nausea or vomiting; No diarrhea   NEUROLOGICAL: No headache or numbness, no tremors  MUSCULOSKELETAL: No joint pain, no muscle pain  GENITOURINARY: no dysuria, no frequency, no hesitancy  PSYCHIATRY: no depression , no anxiety  ALL OTHER  ROS negative        Vital Signs Last 24 Hrs  T(C): 37 (27 Nov 2024 13:30), Max: 37.3 (26 Nov 2024 20:56)  T(F): 98.6 (27 Nov 2024 13:30), Max: 99.2 (26 Nov 2024 20:56)  HR: 101 (27 Nov 2024 13:30) (87 - 107)  BP: 129/70 (27 Nov 2024 13:30) (129/70 - 138/80)  BP(mean): --  RR: 18 (27 Nov 2024 13:30) (16 - 18)  SpO2: 96% (27 Nov 2024 13:30) (95% - 100%)    Parameters below as of 27 Nov 2024 13:30  Patient On (Oxygen Delivery Method): room air        ________________________________________________  PHYSICAL EXAM:  GENERAL: NAD  HEENT: Normocephalic;  conjunctivae and sclerae clear; moist mucous membranes;   NECK : supple  CHEST/LUNG: Clear to auscultation bilaterally with good air entry   HEART: S1 S2  regular; no murmurs, gallops or rubs  ABDOMEN: Soft, Nontender, Nondistended; Bowel sounds present  EXTREMITIES: no cyanosis; no edema; no calf tenderness  SKIN: warm and dry; no rash  NERVOUS SYSTEM:  Awake and alert; Oriented  to place, person and time ; no new deficits    _________________________________________________  LABS:                        10.0   7.61  )-----------( 268      ( 27 Nov 2024 05:59 )             29.6     11-27    139  |  106  |  11  ----------------------------<  200[H]  3.9   |  25  |  0.40[L]    Ca    8.8      27 Nov 2024 05:59  Phos  3.1     11-27  Mg     1.6     11-27    TPro  5.7[L]  /  Alb  1.8[L]  /  TBili  0.4  /  DBili  x   /  AST  50[H]  /  ALT  67[H]  /  AlkPhos  71  11-27    PT/INR - ( 26 Nov 2024 06:19 )   PT: 22.6 sec;   INR: 1.96 ratio         PTT - ( 26 Nov 2024 06:19 )  PTT:28.2 sec  Urinalysis Basic - ( 27 Nov 2024 05:59 )    Color: x / Appearance: x / SG: x / pH: x  Gluc: 200 mg/dL / Ketone: x  / Bili: x / Urobili: x   Blood: x / Protein: x / Nitrite: x   Leuk Esterase: x / RBC: x / WBC x   Sq Epi: x / Non Sq Epi: x / Bacteria: x      CAPILLARY BLOOD GLUCOSE      POCT Blood Glucose.: 218 mg/dL (27 Nov 2024 11:16)  POCT Blood Glucose.: 240 mg/dL (27 Nov 2024 07:51)  POCT Blood Glucose.: 202 mg/dL (26 Nov 2024 23:13)  POCT Blood Glucose.: 231 mg/dL (26 Nov 2024 16:56)        RADIOLOGY & ADDITIONAL TESTS:    Imaging  Reviewed:  YES/NO    Consultant(s) Notes Reviewed:   YES/ No      Plan of care was discussed with patient and /or primary care giver; all questions and concerns were addressed

## 2024-11-27 NOTE — PROGRESS NOTE ADULT - ASSESSMENT
1. Melena  2. Anemia  3. Duodenal ulcer    Suggestions:    1. Monitor H/H  2. Transfuse PRBC as needed  3. Protonix IV  4. Avoid NSAID  5. EGD    6. Diet as tolerated  7. DVT prophylaxis

## 2024-11-27 NOTE — PHARMACOTHERAPY INTERVENTION NOTE - COMMENTS
Patient’s medication profile reviewed. Discussed with patient about their medication. All of patient's questions regarding medications were answered.   Patient’s medication profile reviewed. Discussed with patient and daughter about their medication. All of patient's questions regarding medications were answered.   Patient’s medication profile reviewed. Discussed with patient with daughter interpreting about their medication. All of patient's questions regarding medications were answered.

## 2024-11-27 NOTE — PROGRESS NOTE ADULT - SUBJECTIVE AND OBJECTIVE BOX
[   ] ICU                                          [   ] CCU                                      [ X  ] Medical Floor    Patient is a 84 year old female with GI bleeding and anemia. GI consulted to evaluate.      Patient is a 84 year old female, from rehab, usually lives at home, ambulates with a cane with past medical history significant for DM, HTN, Hyperlipidemia and dementia presented to the emergency room with few episodes of melena and fever. patient recently had EGD for melena where she was found to have duodenal ulcer. No abdominal pain, nausea, vomiting, hematemesis, hematochezia, hemoptysis, chest pain, SOB, cough, hematuria, dysuria or diarrhea reported.    Patient is comfortable. No new complaints reported, No abdominal pain, N/V, hematemesis, hematochezia, melena, fever, chills, chest pain, SOB, cough or diarrhea reported.      PAIN MANAGEMENT:  Pain Scale:                0 /10  Pain Location:         PAST MEDICAL HISTORY    DM (diabetes mellitus)    HTN (hypertension)    Hyperlipidemia    Dementia        PAST SURGICAL HISTORY    No significant past surgical history reported        Allergies    No Known Allergies    Intolerances  None         SOCIAL HISTORY  Advanced Directives:       [ X ] Full Code       [  ] DNR  Marital Status:         [  ] M      [ X ] S      [  ] D       [  ] W  Children:       [ X ] Yes      [  ] No  Occupation:        [  ] Employed       [ X ] Unemployed       [  ] Retired  Diet:       [ X ] Regular       [  ] PEG feeding          [  ] NG tube feeding  Drug Use:           [ X ] No           [  ] Yes  Alcohol:           [X  ] No             [  ] Yes (socially)         [  ] Yes (chronic)  Tobacco:           [  ] Yes           [ X ] No      FAMILY HISTORY  [ X ] Heart Disease            [ X ] Diabetes             [ X ] HTN             [  ] Colon Cancer             [  ] Stomach Cancer              [  ] Pancreatic Cancer        VITALS   Vital Signs Last 24 Hrs  T(C): 36.9 (11-27-24 @ 05:00), Max: 37.3 (11-26-24 @ 20:56)  T(F): 98.4 (11-27-24 @ 05:00), Max: 99.2 (11-26-24 @ 20:56)  HR: 87 (11-27-24 @ 05:00) (87 - 107)  BP: 138/80 (11-27-24 @ 05:00) (137/68 - 146/79)   RR: 18 (11-27-24 @ 05:00) (16 - 18)  SpO2: 100% (11-27-24 @ 05:00) (95% - 100%)      MEDICATIONS  (STANDING):  atorvastatin 10 milliGRAM(s) Oral at bedtime  gabapentin 300 milliGRAM(s) Oral daily  glucagon  Injectable 1 milliGRAM(s) IntraMuscular once  insulin lispro (ADMELOG) corrective regimen sliding scale   SubCutaneous Before meals and at bedtime  pantoprazole  Injectable 40 milliGRAM(s) IV Push every 12 hours  piperacillin/tazobactam IVPB.. 3.375 Gram(s) IV Intermittent every 8 hours    MEDICATIONS  (PRN):  acetaminophen     Tablet .. 650 milliGRAM(s) Oral every 6 hours PRN Temp greater or equal to 38C (100.4F), Mild Pain (1 - 3)  melatonin 3 milliGRAM(s) Oral at bedtime PRN Insomnia                            10.0   7.61  )-----------( 268      ( 27 Nov 2024 05:59 )             29.6       11-27    139  |  106  |  11  ----------------------------<  200[H]  3.9   |  25  |  0.40[L]    Ca    8.8      27 Nov 2024 05:59  Phos  3.1     11-27  Mg     1.6     11-27    TPro  5.7[L]  /  Alb  1.8[L]  /  TBili  0.4  /  DBili  x   /  AST  50[H]  /  ALT  67[H]  /  AlkPhos  71  11-27      PT/INR - ( 26 Nov 2024 06:19 )   PT: 22.6 sec;   INR: 1.96 ratio         PTT - ( 26 Nov 2024 06:19 )  PTT:28.2 sec

## 2024-11-28 LAB
ANION GAP SERPL CALC-SCNC: 6 MMOL/L — SIGNIFICANT CHANGE UP (ref 5–17)
BUN SERPL-MCNC: 10 MG/DL — SIGNIFICANT CHANGE UP (ref 7–18)
CALCIUM SERPL-MCNC: 8.9 MG/DL — SIGNIFICANT CHANGE UP (ref 8.4–10.5)
CHLORIDE SERPL-SCNC: 102 MMOL/L — SIGNIFICANT CHANGE UP (ref 96–108)
CO2 SERPL-SCNC: 29 MMOL/L — SIGNIFICANT CHANGE UP (ref 22–31)
CREAT SERPL-MCNC: 0.54 MG/DL — SIGNIFICANT CHANGE UP (ref 0.5–1.3)
EGFR: 90 ML/MIN/1.73M2 — SIGNIFICANT CHANGE UP
GLUCOSE BLDC GLUCOMTR-MCNC: 284 MG/DL — HIGH (ref 70–99)
GLUCOSE BLDC GLUCOMTR-MCNC: 295 MG/DL — HIGH (ref 70–99)
GLUCOSE BLDC GLUCOMTR-MCNC: 296 MG/DL — HIGH (ref 70–99)
GLUCOSE BLDC GLUCOMTR-MCNC: 318 MG/DL — HIGH (ref 70–99)
GLUCOSE BLDC GLUCOMTR-MCNC: 324 MG/DL — HIGH (ref 70–99)
GLUCOSE SERPL-MCNC: 294 MG/DL — HIGH (ref 70–99)
HCT VFR BLD CALC: 30.5 % — LOW (ref 34.5–45)
HGB BLD-MCNC: 10.1 G/DL — LOW (ref 11.5–15.5)
MAGNESIUM SERPL-MCNC: 1.5 MG/DL — LOW (ref 1.6–2.6)
MCHC RBC-ENTMCNC: 29.4 PG — SIGNIFICANT CHANGE UP (ref 27–34)
MCHC RBC-ENTMCNC: 33.1 G/DL — SIGNIFICANT CHANGE UP (ref 32–36)
MCV RBC AUTO: 88.9 FL — SIGNIFICANT CHANGE UP (ref 80–100)
NRBC # BLD: 0 /100 WBCS — SIGNIFICANT CHANGE UP (ref 0–0)
PHOSPHATE SERPL-MCNC: 2.5 MG/DL — SIGNIFICANT CHANGE UP (ref 2.5–4.5)
PLATELET # BLD AUTO: 424 K/UL — HIGH (ref 150–400)
POTASSIUM SERPL-MCNC: 3.5 MMOL/L — SIGNIFICANT CHANGE UP (ref 3.5–5.3)
POTASSIUM SERPL-SCNC: 3.5 MMOL/L — SIGNIFICANT CHANGE UP (ref 3.5–5.3)
RBC # BLD: 3.43 M/UL — LOW (ref 3.8–5.2)
RBC # FLD: 13.7 % — SIGNIFICANT CHANGE UP (ref 10.3–14.5)
SODIUM SERPL-SCNC: 137 MMOL/L — SIGNIFICANT CHANGE UP (ref 135–145)
WBC # BLD: 8.3 K/UL — SIGNIFICANT CHANGE UP (ref 3.8–10.5)
WBC # FLD AUTO: 8.3 K/UL — SIGNIFICANT CHANGE UP (ref 3.8–10.5)

## 2024-11-28 RX ORDER — 0.9 % SODIUM CHLORIDE 0.9 %
1000 INTRAVENOUS SOLUTION INTRAVENOUS
Refills: 0 | Status: DISCONTINUED | OUTPATIENT
Start: 2024-11-29 | End: 2024-12-02

## 2024-11-28 RX ADMIN — PANTOPRAZOLE SODIUM 40 MILLIGRAM(S): 40 TABLET, DELAYED RELEASE ORAL at 05:00

## 2024-11-28 RX ADMIN — PANTOPRAZOLE SODIUM 40 MILLIGRAM(S): 40 TABLET, DELAYED RELEASE ORAL at 18:37

## 2024-11-28 RX ADMIN — ACETAMINOPHEN, DIPHENHYDRAMINE HCL, PHENYLEPHRINE HCL 3 MILLIGRAM(S): 325; 25; 5 TABLET ORAL at 21:01

## 2024-11-28 RX ADMIN — PIPERACILLIN SODIUM AND TAZOBACTAM SODIUM 25 GRAM(S): 4; .5 INJECTION, POWDER, LYOPHILIZED, FOR SOLUTION INTRAVENOUS at 05:00

## 2024-11-28 RX ADMIN — PIPERACILLIN SODIUM AND TAZOBACTAM SODIUM 25 GRAM(S): 4; .5 INJECTION, POWDER, LYOPHILIZED, FOR SOLUTION INTRAVENOUS at 13:45

## 2024-11-28 RX ADMIN — Medication 3: at 18:40

## 2024-11-28 RX ADMIN — Medication 25 GRAM(S): at 11:26

## 2024-11-28 RX ADMIN — Medication 4: at 08:20

## 2024-11-28 RX ADMIN — Medication 3: at 22:10

## 2024-11-28 RX ADMIN — Medication 10 MILLIGRAM(S): at 21:01

## 2024-11-28 RX ADMIN — PIPERACILLIN SODIUM AND TAZOBACTAM SODIUM 25 GRAM(S): 4; .5 INJECTION, POWDER, LYOPHILIZED, FOR SOLUTION INTRAVENOUS at 21:01

## 2024-11-28 RX ADMIN — Medication 3: at 11:51

## 2024-11-28 NOTE — PROGRESS NOTE ADULT - SUBJECTIVE AND OBJECTIVE BOX
INTERVAL HPI/OVERNIGHT EVENTS:  Patient seen,stable ,no issues  VITAL SIGNS:  T(F): 97.7 (11-28-24 @ 05:01)  HR: 103 (11-28-24 @ 05:01)  BP: 125/80 (11-28-24 @ 05:01)  RR: 18 (11-28-24 @ 05:01)  SpO2: 98% (11-28-24 @ 05:01)  Wt(kg): --    PHYSICAL EXAM:  awake,some confusion  Constitutional:  Eyes:  ENMT:perrla  Neck:  Respiratory:clear  Cardiovascular:s1s2,m-none  Gastrointestinal:soft,bs pos  Extremities:  Vascular:  Neurological:no focal deficit  Musculoskeletal:    MEDICATIONS  (STANDING):  atorvastatin 10 milliGRAM(s) Oral at bedtime  gabapentin 300 milliGRAM(s) Oral daily  glucagon  Injectable 1 milliGRAM(s) IntraMuscular once  insulin lispro (ADMELOG) corrective regimen sliding scale   SubCutaneous Before meals and at bedtime  magnesium sulfate  IVPB 2 Gram(s) IV Intermittent once  pantoprazole  Injectable 40 milliGRAM(s) IV Push every 12 hours  piperacillin/tazobactam IVPB.. 3.375 Gram(s) IV Intermittent every 8 hours    MEDICATIONS  (PRN):  acetaminophen     Tablet .. 650 milliGRAM(s) Oral every 6 hours PRN Temp greater or equal to 38C (100.4F), Mild Pain (1 - 3)  melatonin 3 milliGRAM(s) Oral at bedtime PRN Insomnia      Allergies    No Known Allergies    Intolerances        LABS:                        10.1   8.30  )-----------( 424      ( 28 Nov 2024 05:31 )             30.5     11-28    137  |  102  |  10  ----------------------------<  294[H]  3.5   |  29  |  0.54    Ca    8.9      28 Nov 2024 05:31  Phos  2.5     11-28  Mg     1.5     11-28    TPro  5.7[L]  /  Alb  1.8[L]  /  TBili  0.4  /  DBili  x   /  AST  50[H]  /  ALT  67[H]  /  AlkPhos  71  11-27      Urinalysis Basic - ( 28 Nov 2024 05:31 )    Color: x / Appearance: x / SG: x / pH: x  Gluc: 294 mg/dL / Ketone: x  / Bili: x / Urobili: x   Blood: x / Protein: x / Nitrite: x   Leuk Esterase: x / RBC: x / WBC x   Sq Epi: x / Non Sq Epi: x / Bacteria: x        RADIOLOGY & ADDITIONAL TESTS:      ASSESSMENT:  85yo, F, From [facility], usually lives at home with family, ambulates with a cane and PMH of DM, HTN, HLD and dementia (baseline AOx2-3).  Presents to the ED for fever and melena.  Admitted GI bleed and sepsis tuttle.           Problem/Plan - 1:  ·  Problem: Melena-stable hemodinamicly   · h/h stable   - Continue to monitor CBC.  - Maintain active T&S  - C/w Protonix BID  - GI-Dr. Glass planning for EGD on friday  -supplement Mg     Problem/Plan - 2:  ·  Problem: Sepsis on admittion-stable  clinically  ·  Plan:   - Currently on Zosyn.  ? ID-f/u   - Bld cx's pending-f/u results   - Currently on IVF      Problem/Plan - 3:  ·  Problem: DM (diabetes mellitus).   ·  Plan:  H/o DM on Metformin & Mounjaro  - C/w HSS      Problem/Plan - 4:  ·  Problem: HTN (hypertension).   ·  Plan:   - Currently holding BP meds iso possible GI bleed  - Continue to monitor BP and adjust medication accordingly      Problem/Plan - 5:  ·  Problem: HLD (hyperlipidemia).   ·  Plan:   - C/w Atorvastatin    Problem/Plan - 6:  ·  Problem: Electrolyte Abnormalities.   ·  Plan:  - Supplemented today  - Continue to monitor      Problem/Plan - 7:  ·  Problem: Prophylactic measure.   ·  Plan:   - C/w SCDs   - C/w Protonix BID.

## 2024-11-28 NOTE — PROGRESS NOTE ADULT - SUBJECTIVE AND OBJECTIVE BOX
[   ] ICU                                          [   ] CCU                                      [ X  ] Medical Floor    Patient is a 84 year old female with GI bleeding and anemia. GI consulted to evaluate.      Patient is a 84 year old female, from rehab, usually lives at home, ambulates with a cane with past medical history significant for DM, HTN, Hyperlipidemia and dementia presented to the emergency room with few episodes of melena and fever. patient recently had EGD for melena where she was found to have duodenal ulcer. No abdominal pain, nausea, vomiting, hematemesis, hematochezia, hemoptysis, chest pain, SOB, cough, hematuria, dysuria or diarrhea reported.    Patient is comfortable. No new complaints reported, No abdominal pain, N/V, hematemesis, hematochezia, melena, fever, chills, chest pain, SOB, cough or diarrhea reported.      PAIN MANAGEMENT:  Pain Scale:                0 /10  Pain Location:         PAST MEDICAL HISTORY    DM (diabetes mellitus)    HTN (hypertension)    Hyperlipidemia    Dementia        PAST SURGICAL HISTORY    No significant past surgical history reported        Allergies    No Known Allergies    Intolerances  None         SOCIAL HISTORY  Advanced Directives:       [ X ] Full Code       [  ] DNR  Marital Status:         [  ] M      [ X ] S      [  ] D       [  ] W  Children:       [ X ] Yes      [  ] No  Occupation:        [  ] Employed       [ X ] Unemployed       [  ] Retired  Diet:       [ X ] Regular       [  ] PEG feeding          [  ] NG tube feeding  Drug Use:           [ X ] No           [  ] Yes  Alcohol:           [X  ] No             [  ] Yes (socially)         [  ] Yes (chronic)  Tobacco:           [  ] Yes           [ X ] No      FAMILY HISTORY  [ X ] Heart Disease            [ X ] Diabetes             [ X ] HTN             [  ] Colon Cancer             [  ] Stomach Cancer              [  ] Pancreatic Cancer        VITALS  Vital Signs Last 24 Hrs  T(C): 36.7 (11-29-24 @ 11:01), Max: 37.2 (11-29-24 @ 10:19)  T(F): 98.1 (11-29-24 @ 11:01), Max: 99 (11-29-24 @ 10:19)  HR: 107 (11-29-24 @ 12:05) (100 - 986)  BP: 163/81 (11-29-24 @ 12:05) (107/72 - 174/81)   RR: 20 (11-29-24 @ 12:05) (17 - 21)  SpO2: 99% (11-29-24 @ 12:05) (94% - 100%)       MEDICATIONS  (STANDING):  atorvastatin 10 milliGRAM(s) Oral at bedtime  gabapentin 300 milliGRAM(s) Oral daily  glucagon  Injectable 1 milliGRAM(s) IntraMuscular once  insulin lispro (ADMELOG) corrective regimen sliding scale   SubCutaneous every 6 hours  lactated ringers. 1000 milliLiter(s) (75 mL/Hr) IV Continuous <Continuous>  pantoprazole  Injectable 40 milliGRAM(s) IV Push every 12 hours  piperacillin/tazobactam IVPB.. 3.375 Gram(s) IV Intermittent every 8 hours    MEDICATIONS  (PRN):  acetaminophen     Tablet .. 650 milliGRAM(s) Oral every 6 hours PRN Temp greater or equal to 38C (100.4F), Mild Pain (1 - 3)  melatonin 3 milliGRAM(s) Oral at bedtime PRN Insomnia                            9.9    10.35 )-----------( 422      ( 29 Nov 2024 06:21 )             29.6       11-29    137  |  102  |  10  ----------------------------<  278[H]  3.4[L]   |  28  |  0.50    Ca    9.1      29 Nov 2024 06:21  Phos  2.4     11-29  Mg     1.7     11-29    TPro  6.3  /  Alb  2.1[L]  /  TBili  0.3  /  DBili  x   /  AST  21  /  ALT  52  /  AlkPhos  79  11-29      PT/INR - ( 29 Nov 2024 06:21 )   PT: 15.3 sec;   INR: 1.31 ratio         PTT - ( 29 Nov 2024 06:21 )  PTT:27.5 sec

## 2024-11-29 LAB
ALBUMIN SERPL ELPH-MCNC: 2.1 G/DL — LOW (ref 3.5–5)
ALP SERPL-CCNC: 79 U/L — SIGNIFICANT CHANGE UP (ref 40–120)
ALT FLD-CCNC: 52 U/L DA — SIGNIFICANT CHANGE UP (ref 10–60)
ANION GAP SERPL CALC-SCNC: 7 MMOL/L — SIGNIFICANT CHANGE UP (ref 5–17)
APTT BLD: 27.5 SEC — SIGNIFICANT CHANGE UP (ref 24.5–35.6)
AST SERPL-CCNC: 21 U/L — SIGNIFICANT CHANGE UP (ref 10–40)
BILIRUB SERPL-MCNC: 0.3 MG/DL — SIGNIFICANT CHANGE UP (ref 0.2–1.2)
BLD GP AB SCN SERPL QL: SIGNIFICANT CHANGE UP
BUN SERPL-MCNC: 10 MG/DL — SIGNIFICANT CHANGE UP (ref 7–18)
CALCIUM SERPL-MCNC: 9.1 MG/DL — SIGNIFICANT CHANGE UP (ref 8.4–10.5)
CHLORIDE SERPL-SCNC: 102 MMOL/L — SIGNIFICANT CHANGE UP (ref 96–108)
CO2 SERPL-SCNC: 28 MMOL/L — SIGNIFICANT CHANGE UP (ref 22–31)
CREAT SERPL-MCNC: 0.5 MG/DL — SIGNIFICANT CHANGE UP (ref 0.5–1.3)
EGFR: 91 ML/MIN/1.73M2 — SIGNIFICANT CHANGE UP
GLUCOSE BLDC GLUCOMTR-MCNC: 240 MG/DL — HIGH (ref 70–99)
GLUCOSE BLDC GLUCOMTR-MCNC: 241 MG/DL — HIGH (ref 70–99)
GLUCOSE BLDC GLUCOMTR-MCNC: 244 MG/DL — HIGH (ref 70–99)
GLUCOSE BLDC GLUCOMTR-MCNC: 267 MG/DL — HIGH (ref 70–99)
GLUCOSE BLDC GLUCOMTR-MCNC: 268 MG/DL — HIGH (ref 70–99)
GLUCOSE BLDC GLUCOMTR-MCNC: 276 MG/DL — HIGH (ref 70–99)
GLUCOSE SERPL-MCNC: 278 MG/DL — HIGH (ref 70–99)
HCT VFR BLD CALC: 29.6 % — LOW (ref 34.5–45)
HGB BLD-MCNC: 9.9 G/DL — LOW (ref 11.5–15.5)
INR BLD: 1.31 RATIO — HIGH (ref 0.85–1.16)
MAGNESIUM SERPL-MCNC: 1.7 MG/DL — SIGNIFICANT CHANGE UP (ref 1.6–2.6)
MCHC RBC-ENTMCNC: 29.6 PG — SIGNIFICANT CHANGE UP (ref 27–34)
MCHC RBC-ENTMCNC: 33.4 G/DL — SIGNIFICANT CHANGE UP (ref 32–36)
MCV RBC AUTO: 88.6 FL — SIGNIFICANT CHANGE UP (ref 80–100)
NRBC # BLD: 0 /100 WBCS — SIGNIFICANT CHANGE UP (ref 0–0)
PHOSPHATE SERPL-MCNC: 2.4 MG/DL — LOW (ref 2.5–4.5)
PLATELET # BLD AUTO: 422 K/UL — HIGH (ref 150–400)
POTASSIUM SERPL-MCNC: 3.4 MMOL/L — LOW (ref 3.5–5.3)
POTASSIUM SERPL-SCNC: 3.4 MMOL/L — LOW (ref 3.5–5.3)
PROT SERPL-MCNC: 6.3 G/DL — SIGNIFICANT CHANGE UP (ref 6–8.3)
PROTHROM AB SERPL-ACNC: 15.3 SEC — HIGH (ref 9.9–13.4)
RBC # BLD: 3.34 M/UL — LOW (ref 3.8–5.2)
RBC # FLD: 13.6 % — SIGNIFICANT CHANGE UP (ref 10.3–14.5)
SODIUM SERPL-SCNC: 137 MMOL/L — SIGNIFICANT CHANGE UP (ref 135–145)
WBC # BLD: 10.35 K/UL — SIGNIFICANT CHANGE UP (ref 3.8–10.5)
WBC # FLD AUTO: 10.35 K/UL — SIGNIFICANT CHANGE UP (ref 3.8–10.5)

## 2024-11-29 PROCEDURE — 88305 TISSUE EXAM BY PATHOLOGIST: CPT | Mod: 26

## 2024-11-29 PROCEDURE — 88312 SPECIAL STAINS GROUP 1: CPT | Mod: 26

## 2024-11-29 PROCEDURE — 43239 EGD BIOPSY SINGLE/MULTIPLE: CPT

## 2024-11-29 RX ORDER — POTASSIUM CHLORIDE 600 MG/1
40 TABLET, EXTENDED RELEASE ORAL ONCE
Refills: 0 | Status: COMPLETED | OUTPATIENT
Start: 2024-11-29 | End: 2024-11-29

## 2024-11-29 RX ORDER — PANTOPRAZOLE SODIUM 40 MG/1
40 TABLET, DELAYED RELEASE ORAL EVERY 12 HOURS
Refills: 0 | Status: DISCONTINUED | OUTPATIENT
Start: 2024-11-30 | End: 2024-12-02

## 2024-11-29 RX ADMIN — PIPERACILLIN SODIUM AND TAZOBACTAM SODIUM 25 GRAM(S): 4; .5 INJECTION, POWDER, LYOPHILIZED, FOR SOLUTION INTRAVENOUS at 05:00

## 2024-11-29 RX ADMIN — POTASSIUM CHLORIDE 40 MILLIEQUIVALENT(S): 600 TABLET, EXTENDED RELEASE ORAL at 13:11

## 2024-11-29 RX ADMIN — Medication 3: at 00:19

## 2024-11-29 RX ADMIN — PIPERACILLIN SODIUM AND TAZOBACTAM SODIUM 25 GRAM(S): 4; .5 INJECTION, POWDER, LYOPHILIZED, FOR SOLUTION INTRAVENOUS at 22:38

## 2024-11-29 RX ADMIN — Medication 2: at 13:09

## 2024-11-29 RX ADMIN — PANTOPRAZOLE SODIUM 40 MILLIGRAM(S): 40 TABLET, DELAYED RELEASE ORAL at 18:16

## 2024-11-29 RX ADMIN — Medication 75 MILLILITER(S): at 00:02

## 2024-11-29 RX ADMIN — Medication 3: at 18:06

## 2024-11-29 RX ADMIN — Medication 3: at 05:41

## 2024-11-29 RX ADMIN — PANTOPRAZOLE SODIUM 40 MILLIGRAM(S): 40 TABLET, DELAYED RELEASE ORAL at 05:00

## 2024-11-29 RX ADMIN — Medication 10 MILLIGRAM(S): at 22:37

## 2024-11-29 RX ADMIN — GABAPENTIN 300 MILLIGRAM(S): 300 CAPSULE ORAL at 13:10

## 2024-11-29 RX ADMIN — PIPERACILLIN SODIUM AND TAZOBACTAM SODIUM 25 GRAM(S): 4; .5 INJECTION, POWDER, LYOPHILIZED, FOR SOLUTION INTRAVENOUS at 13:10

## 2024-11-29 NOTE — PROGRESS NOTE ADULT - PROBLEM SELECTOR PLAN 3
- On admission met SIRS: Temp 101F, HR 121BPM, WBC 14.8  - Code sepsis in the ED  - CXR & UA negative  - Currently on Zosyn    - Bld cx's NGTD as of 11/27- pending-f/u results   - ID-Dr. Partida following
- On admission met SIRS: Temp 101F, HR 121BPM, WBC 14.8  - Code sepsis in the ED  - CXR & UA negative  - Currently on Zosyn.    - Bld cx's pending-f/u results   - S/p IVF   - ID-Dr. Partida consult pending-f/u rec's
- On admission met SIRS: Temp 101F, HR 121BPM, WBC 14.8  - Code sepsis in the ED, unknown source   - CXR & UA negative  - Currently on Zosyn    - Bld cx's NGTD as of 11/29  - ID-Dr. Partida following

## 2024-11-29 NOTE — PROGRESS NOTE ADULT - SUBJECTIVE AND OBJECTIVE BOX
NP Note discussed with  primary attending    Patient is a 84y old  Female who presents with a chief complaint of Melena (29 Nov 2024 12:32)      INTERVAL HPI/OVERNIGHT EVENTS: no new complaints    MEDICATIONS  (STANDING):  atorvastatin 10 milliGRAM(s) Oral at bedtime  gabapentin 300 milliGRAM(s) Oral daily  glucagon  Injectable 1 milliGRAM(s) IntraMuscular once  insulin lispro (ADMELOG) corrective regimen sliding scale   SubCutaneous every 6 hours  lactated ringers. 1000 milliLiter(s) (75 mL/Hr) IV Continuous <Continuous>  pantoprazole  Injectable 40 milliGRAM(s) IV Push every 12 hours  piperacillin/tazobactam IVPB.. 3.375 Gram(s) IV Intermittent every 8 hours    MEDICATIONS  (PRN):  acetaminophen     Tablet .. 650 milliGRAM(s) Oral every 6 hours PRN Temp greater or equal to 38C (100.4F), Mild Pain (1 - 3)  melatonin 3 milliGRAM(s) Oral at bedtime PRN Insomnia      __________________________________________________  REVIEW OF SYSTEMS:    CONSTITUTIONAL: No fever,   RESPIRATORY: No cough; No shortness of breath  CARDIOVASCULAR: No chest pain, no palpitations  GASTROINTESTINAL: No pain. No nausea or vomiting; No diarrhea   NEUROLOGICAL: No headache or numbness, no tremors  MUSCULOSKELETAL: No joint pain, no muscle pain  GENITOURINARY: no dysuria, no frequency, no hesitancy        Vital Signs Last 24 Hrs  T(C): 36.7 (29 Nov 2024 11:01), Max: 37.2 (29 Nov 2024 10:19)  T(F): 98.1 (29 Nov 2024 11:01), Max: 99 (29 Nov 2024 10:19)  HR: 107 (29 Nov 2024 12:05) (100 - 986)  BP: 163/81 (29 Nov 2024 12:05) (107/72 - 174/81)  BP(mean): --  RR: 20 (29 Nov 2024 12:05) (17 - 21)  SpO2: 99% (29 Nov 2024 12:05) (94% - 100%)    Parameters below as of 29 Nov 2024 12:05  Patient On (Oxygen Delivery Method): room air        ________________________________________________  PHYSICAL EXAM:  GENERAL: NAD  CHEST/LUNG: Clear to ausculitation bilaterally   HEART: S1 S2  regular; no murmurs, gallops or rubs  ABDOMEN: Soft, Nontender, Nondistended; Bowel sounds present  EXTREMITIES: no cyanosis; no edema; no calf tenderness  SKIN: warm and dry; no rash  NERVOUS SYSTEM:  Awake and alert; Oriented  to place, person and disoriented to time ; no new deficits    _________________________________________________  LABS:                        9.9    10.35 )-----------( 422      ( 29 Nov 2024 06:21 )             29.6     11-29    137  |  102  |  10  ----------------------------<  278[H]  3.4[L]   |  28  |  0.50    Ca    9.1      29 Nov 2024 06:21  Phos  2.4     11-29  Mg     1.7     11-29    TPro  6.3  /  Alb  2.1[L]  /  TBili  0.3  /  DBili  x   /  AST  21  /  ALT  52  /  AlkPhos  79  11-29    PT/INR - ( 29 Nov 2024 06:21 )   PT: 15.3 sec;   INR: 1.31 ratio         PTT - ( 29 Nov 2024 06:21 )  PTT:27.5 sec  Urinalysis Basic - ( 29 Nov 2024 06:21 )    Color: x / Appearance: x / SG: x / pH: x  Gluc: 278 mg/dL / Ketone: x  / Bili: x / Urobili: x   Blood: x / Protein: x / Nitrite: x   Leuk Esterase: x / RBC: x / WBC x   Sq Epi: x / Non Sq Epi: x / Bacteria: x      CAPILLARY BLOOD GLUCOSE      POCT Blood Glucose.: 241 mg/dL (29 Nov 2024 12:58)  POCT Blood Glucose.: 240 mg/dL (29 Nov 2024 10:54)  POCT Blood Glucose.: 276 mg/dL (29 Nov 2024 05:35)  POCT Blood Glucose.: 268 mg/dL (29 Nov 2024 00:06)  POCT Blood Glucose.: 296 mg/dL (28 Nov 2024 22:05)  POCT Blood Glucose.: 295 mg/dL (28 Nov 2024 18:36)  POCT Blood Glucose.: 324 mg/dL (28 Nov 2024 16:59)        RADIOLOGY & ADDITIONAL TESTS:    Imaging Personally Reviewed:  YES/NO    Consultant(s) Notes Reviewed:   YES/ No    Care Discussed with Consultants :     Plan of care was discussed with patient and /or primary care giver; all questions and concerns were addressed and care was aligned with patient's wishes.

## 2024-11-29 NOTE — PROGRESS NOTE ADULT - PROBLEM SELECTOR PLAN 1
- Cherie wnl  - Continue to monitor daily
- Cherie wnl  - Continue to monitor daily
- Supplemented today  - Continue to monitor

## 2024-11-29 NOTE — PROGRESS NOTE ADULT - SUBJECTIVE AND OBJECTIVE BOX
84y Female is under our care for     MEDS:  piperacillin/tazobactam IVPB.. 3.375 Gram(s) IV Intermittent every 8 hours    ALLERGIES: Allergies    No Known Allergies    Intolerances    REVIEW OF SYSTEMS:  [  ] Not able to elicit  General:	  Chest:	  GI:	  :  Skin:	  Musculoskeletal:	  Neuro:	    VITALS:  Vital Signs Last 24 Hrs  T(C): 36.7 (2024 11:01), Max: 37.2 (2024 10:19)  T(F): 98.1 (2024 11:01), Max: 99 (2024 10:19)  HR: 107 (2024 12:05) (100 - 111)  BP: 163/81 (2024 12:05) (107/72 - 174/81)  BP(mean): --  RR: 20 (2024 12:05) (17 - 21)  SpO2: 99% (2024 12:05) (94% - 100%)    Parameters below as of 2024 12:05  Patient On (Oxygen Delivery Method): room air    PHYSICAL EXAM:  HEENT:  Neck:  Respiratory:  Cardiovascular:  Gastrointestinal:  :  Extremities:  Skin:  Ortho:  Neuro:    LABS/DIAGNOSTIC TESTS:                        9.9    10.35 )-----------( 422      ( 2024 06:21 )             29.6     WBC Count: 10.35 K/uL ( @ 06:21)  WBC Count: 8.30 K/uL ( @ 05:31)  WBC Count: 7.61 K/uL ( @ 05:59)  WBC Count: 9.74 K/uL ( @ 06:19)  WBC Count: 9.37 K/uL ( @ 17:25)        137  |  102  |  10  ----------------------------<  278[H]  3.4[L]   |  28  |  0.50    Ca    9.1      2024 06:21  Phos  2.4       Mg     1.7         TPro  6.3  /  Alb  2.1[L]  /  TBili  0.3  /  DBili  x   /  AST  21  /  ALT  52  /  AlkPhos  79      CULTURES:   .Blood BLOOD   @ 21:05   No growth at 4 days  --  --    .Blood BLOOD   @ 20:55   No growth at 4 days  --  --    .Blood BLOOD   @ 12:18   No growth at 5 days  --  --    .Blood BLOOD   @ 12:10   No growth at 5 days  --  --    RADIOLOGY:   < from: EGD (24 @ 10:30) >  EGD Report  Date: 2024 10:30 AM  Patient Name: GILDA GOMEZ  MRN: [id number]  Account Number:  [id number]    Gender: Female     (age): 1940 (84)    Attending/Fellow:  Kamlesh Joseph MD    Technician:  Amanda Pino RN    Procedure Room #:  2    Nurse(s):  Zehar Lan RN    ASA Class:  P3 - 2024 11:41 AM Kamlesh Joseph MD    History of Present Illness:  H&P was previously reviewed.  Administered Medications:    As per Anesthesiology Record  Indications:  Melena: 578.1 - K92.1    Duodenal Ulcer found on recent EGD, now with drop in Hgb again. Will evaluate to    see if anything needs to betreated endoscopically.: 532.21    Procedure:  The procedure, indications, preparation and potential complications were    explained to the patient, who indicated understanding and signed the    corresponding consent forms. MAC was administered by anesthesiologist.    Continuous pulse oximetry and blood pressure monitoring were used throughout the    procedure. Supplemental oxygen was used. Patient was placed in the left lateral    decubitus position. The endoscope was introduced through the mouth and advanced    under direct visualization until the second part of the duodenum was reached.    Patient tolerance to the procedure was good. The procedure was not difficult.    Blood loss was minimal.    Limitations/Complications:    There were no apparent limitations or complications    Plan:  Return to floor for further management    Additional Notes:    Procedure note:    EGD:  - Normal esophageal mucosa    - Z line normal at 30 cm.    - 5 cm hiatal hernia with diaphragmatic pinch at 35 cm from incisors.    - Normal gastric mucosa. Random gastric biopsies obtained to rule out H pylori.    - Normal duodenal bulb.    - 2 cm clean based ulcer in duodenal sweep. No vessels seen.    - At least 2 healed ulcers seen in D2 and D3 area.    Impression:    - 2 cm clean based ulcer in duodenal sweep. No vessels seen.    - At least 2 healed ulcers seen in D2 and D3 area.    -  5 cm hiatal hernia    Plan:    - Can resume prior diet and home medication.    - Continue PPI 40mg twice daily 30 min pre meals. Do not stop this medication.    On discharge, please send refills.    - Can resume anticoagulation but watch Hgb and trend H/H for 1-2 days on    anticoagulation.    - Rest of plan as per primary team.  Kamlesh Joseph MD    Version 1, Electronically signed on 2024 11:45:32 AM by Kamlesh Joseph MD    < end of copied text >   84y Female s/p egd today. Lying in bed and in no acute distress. Denies any new complaints. No fevers, chills or diarrhea.      MEDS:  piperacillin/tazobactam IVPB.. 3.375 Gram(s) IV Intermittent every 8 hours    ALLERGIES: Allergies    No Known Allergies    Intolerances    REVIEW OF SYSTEMS:  [  ] Not able to elicit  General: no fevers no malaise  Chest: no cough no sob  GI: no nvd  : no urinary sxs   Skin: no rashes  Musculoskeletal: no trauma no LBP  Neuro: no ha's no dizziness     VITALS:  Vital Signs Last 24 Hrs  T(C): 36.7 (2024 11:01), Max: 37.2 (2024 10:19)  T(F): 98.1 (2024 11:01), Max: 99 (2024 10:19)  HR: 107 (2024 12:05) (100 - 111)  BP: 163/81 (2024 12:05) (107/72 - 174/81)  BP(mean): --  RR: 20 (2024 12:05) (17 - 21)  SpO2: 99% (2024 12:05) (94% - 100%)    Parameters below as of 2024 12:05  Patient On (Oxygen Delivery Method): room air    PHYSICAL EXAM:  HEENT: normocephalic, conjunctivae and sclerae clear; moist mucous membranes  Neck: supple no LN's   Respiratory: lungs clear no rales  Cardiovascular: S1 S2 reg no murmurs; tachy   Gastrointestinal: +BS with soft, nondistended abdomen; nontender  Extremities: no edema  Skin: no rashes  Ortho: no erythema or joint swelling  Neuro: AAO x 3    LABS/DIAGNOSTIC TESTS:                        9.9    10.35 )-----------( 422      ( 2024 06:21 )             29.6     WBC Count: 10.35 K/uL ( @ 06:21)  WBC Count: 8.30 K/uL ( @ 05:31)  WBC Count: 7.61 K/uL ( @ 05:59)  WBC Count: 9.74 K/uL ( @ 06:19)  WBC Count: 9.37 K/uL ( @ 17:25)        137  |  102  |  10  ----------------------------<  278[H]  3.4[L]   |  28  |  0.50    Ca    9.1      2024 06:21  Phos  2.4       Mg     1.7         TPro  6.3  /  Alb  2.1[L]  /  TBili  0.3  /  DBili  x   /  AST  21  /  ALT  52  /  AlkPhos  79      CULTURES:   .Blood BLOOD   @ 21:05   No growth at 4 days  --  --    .Blood BLOOD   @ 20:55   No growth at 4 days  --  --    .Blood BLOOD   @ 12:18   No growth at 5 days  --  --    .Blood BLOOD   @ 12:10   No growth at 5 days  --  --    RADIOLOGY:   < from: EGD (24 @ 10:30) >  EGD Report  Date: 2024 10:30 AM  Patient Name: GILDA GOMEZ  MRN: [id number]  Account Number:  [id number]    Gender: Female     (age): 1940 (84)    Attending/Fellow:  Kamlesh Joseph MD    Technician:  Amanda Pino RN    Procedure Room #:  2    Nurse(s):  Zehra Lan RN    ASA Class:  P3 - 2024 11:41 AM Kamlesh Joseph MD    History of Present Illness:  H&P was previously reviewed.  Administered Medications:    As per Anesthesiology Record  Indications:  Melena: 578.1 - K92.1    Duodenal Ulcer found on recent EGD, now with drop in Hgb again. Will evaluate to    see if anything needs to betreated endoscopically.: 532.21    Procedure:  The procedure, indications, preparation and potential complications were    explained to the patient, who indicated understanding and signed the    corresponding consent forms. MAC was administered by anesthesiologist.    Continuous pulse oximetry and blood pressure monitoring were used throughout the    procedure. Supplemental oxygen was used. Patient was placed in the left lateral    decubitus position. The endoscope was introduced through the mouth and advanced    under direct visualization until the second part of the duodenum was reached.    Patient tolerance to the procedure was good. The procedure was not difficult.    Blood loss was minimal.    Limitations/Complications:    There were no apparent limitations or complications    Plan:  Return to floor for further management    Additional Notes:    Procedure note:    EGD:  - Normal esophageal mucosa    - Z line normal at 30 cm.    - 5 cm hiatal hernia with diaphragmatic pinch at 35 cm from incisors.    - Normal gastric mucosa. Random gastric biopsies obtained to rule out H pylori.    - Normal duodenal bulb.    - 2 cm clean based ulcer in duodenal sweep. No vessels seen.    - At least 2 healed ulcers seen in D2 and D3 area.    Impression:    - 2 cm clean based ulcer in duodenal sweep. No vessels seen.    - At least 2 healed ulcers seen in D2 and D3 area.    -  5 cm hiatal hernia    Plan:    - Can resume prior diet and home medication.    - Continue PPI 40mg twice daily 30 min pre meals. Do not stop this medication.    On discharge, please send refills.    - Can resume anticoagulation but watch Hgb and trend H/H for 1-2 days on    anticoagulation.    - Rest of plan as per primary team.  Kamlesh Joseph MD    Version 1, Electronically signed on 2024 11:45:32 AM by Kamlesh Joseph MD    < end of copied text >

## 2024-11-29 NOTE — PROGRESS NOTE ADULT - PROBLEM SELECTOR PLAN 5
- Currently holding BP meds iso possible GI bleed  - Continue to monitor BP and adjust medication accordingly

## 2024-11-29 NOTE — PROGRESS NOTE ADULT - ASSESSMENT
85 y/o F, From [facility], usually lives at home with family, ambulates with a cane and PMH of DM, HTN, HLD and dementia (baseline AOx2-3).  Presents to the ED for fever and melena.  Admitted GI bleed and sepsis tuttle.  Currently on Zosyn empirically with ID following. Labs wnl w/o leukocytosis.  GI consulted, sp  EGD 11/29. Pt found to have 2 cm clean based ulcer in duodenal sweep. No vessels seen.  At least 2 healed ulcers, 5 cm hiatal hernia.  PPI 40mg twice daily continued   Tolerating diet, no further bleeding

## 2024-11-29 NOTE — PROGRESS NOTE ADULT - PROBLEM SELECTOR PLAN 4
H/o DM on Metformin & Mounjaro  - C/w HSS

## 2024-11-29 NOTE — PROGRESS NOTE ADULT - SUBJECTIVE AND OBJECTIVE BOX
[   ] ICU                                          [   ] CCU                                      [ X  ] Medical Floor    Patient is a 84 year old female with GI bleeding and anemia. GI consulted to evaluate.      Patient is a 84 year old female, from rehab, usually lives at home, ambulates with a cane with past medical history significant for DM, HTN, Hyperlipidemia and dementia presented to the emergency room with few episodes of melena and fever. patient recently had EGD for melena where she was found to have duodenal ulcer. No abdominal pain, nausea, vomiting, hematemesis, hematochezia, hemoptysis, chest pain, SOB, cough, hematuria, dysuria or diarrhea reported.    Patient is comfortable. No new complaints reported, No abdominal pain, N/V, hematemesis, hematochezia, melena, fever, chills, chest pain, SOB, cough or diarrhea reported.      PAIN MANAGEMENT:  Pain Scale:                0 /10  Pain Location:         PAST MEDICAL HISTORY    DM (diabetes mellitus)    HTN (hypertension)    Hyperlipidemia    Dementia        PAST SURGICAL HISTORY    No significant past surgical history reported        Allergies    No Known Allergies    Intolerances  None         SOCIAL HISTORY  Advanced Directives:       [ X ] Full Code       [  ] DNR  Marital Status:         [  ] M      [ X ] S      [  ] D       [  ] W  Children:       [ X ] Yes      [  ] No  Occupation:        [  ] Employed       [ X ] Unemployed       [  ] Retired  Diet:       [ X ] Regular       [  ] PEG feeding          [  ] NG tube feeding  Drug Use:           [ X ] No           [  ] Yes  Alcohol:           [X  ] No             [  ] Yes (socially)         [  ] Yes (chronic)  Tobacco:           [  ] Yes           [ X ] No      FAMILY HISTORY  [ X ] Heart Disease            [ X ] Diabetes             [ X ] HTN             [  ] Colon Cancer             [  ] Stomach Cancer              [  ] Pancreatic Cancer        VITALS   Vital Signs Last 24 Hrs  T(C): 36.7 (24 @ 11:01), Max: 37.2 (24 @ 10:19)  T(F): 98.1 (24 @ 11:01), Max: 99 (24 @ 10:19)  HR: 107 (24 @ 12:05) (100 - 986)  BP: 163/81 (24 @ 12:05) (107/72 - 174/81)   RR: 20 (24 @ 12:05) (17 - 21)  SpO2: 99% (24 @ 12:05) (94% - 100%)        MEDICATIONS  (STANDING):  atorvastatin 10 milliGRAM(s) Oral at bedtime  gabapentin 300 milliGRAM(s) Oral daily  glucagon  Injectable 1 milliGRAM(s) IntraMuscular once  insulin lispro (ADMELOG) corrective regimen sliding scale   SubCutaneous every 6 hours  lactated ringers. 1000 milliLiter(s) (75 mL/Hr) IV Continuous <Continuous>  pantoprazole  Injectable 40 milliGRAM(s) IV Push every 12 hours  piperacillin/tazobactam IVPB.. 3.375 Gram(s) IV Intermittent every 8 hours    MEDICATIONS  (PRN):  acetaminophen     Tablet .. 650 milliGRAM(s) Oral every 6 hours PRN Temp greater or equal to 38C (100.4F), Mild Pain (1 - 3)  melatonin 3 milliGRAM(s) Oral at bedtime PRN Insomnia                            9.9    10.35 )-----------( 422      ( 2024 06:21 )             29.6           137  |  102  |  10  ----------------------------<  278[H]  3.4[L]   |  28  |  0.50    Ca    9.1      2024 06:21  Phos  2.4       Mg     1.7         TPro  6.3  /  Alb  2.1[L]  /  TBili  0.3  /  DBili  x   /  AST  21  /  ALT  52  /  AlkPhos  79        PT/INR - ( 2024 06:21 )   PT: 15.3 sec;   INR: 1.31 ratio         PTT - ( 2024 06:21 )  PTT:27.5 sec      EGD Report        Date: 2024 10:30 AM        Patient Name: GILDA GOMEZ        MRN: [id number]        Account Number:        [id number]        Gender: Female         (age): 1940 (84)        Attending/Fellow:        Kamlesh Joseph MD        Technician:        Amanda Pino RN        Procedure Room #:        2                        Nurse(s):        Zehra Lan RN        ASA Class:        P3 - 2024 11:41 AM Kamlesh Joseph MD        History of Present Illness:        H&P was previously reviewed.        Administered Medications:        As per Anesthesiology Record        Indications:        Melena: 578.1 - K92.1        Duodenal Ulcer found on recent EGD, now with drop in Hgb again. Will evaluate to    see if anything needs to be treated endoscopically.: 532.21        Procedure:        The procedure, indications, preparation and potential complications were    explained to the patient, who indicated understanding and signed the    corresponding consent forms. MAC was administered by anesthesiologist.    Continuous pulse oximetry and blood pressure monitoring were used throughout the    procedure. Supplemental oxygen was used. Patient was placed in the left lateral    decubitus position. The endoscope was introduced through the mouth and advanced    under direct visualization until the second part of the duodenum was reached.    Patient tolerance to the procedure was good. The procedure was not difficult.    Blood loss was minimal.        Limitations/Complications:        There were no apparent limitations or complications                        Plan:        Return to floor for further management        Additional Notes:        Procedure note:        EGD:        - Normal esophageal mucosa        - Z line normal at 30 cm.        - 5 cm hiatal hernia with diaphragmatic pinch at 35 cm from incisors.        - Normal gastric mucosa. Random gastric biopsies obtained to rule out H pylori.        - Normal duodenal bulb.        - 2 cm clean based ulcer in duodenal sweep. No vessels seen.        - At least 2 healed ulcers seen in D2 and D3 area.        Impression:        - 2 cm clean based ulcer in duodenal sweep. No vessels seen.        - At least 2 healed ulcers seen in D2 and D3 area.        -  5 cm hiatal hernia        Plan:        - Can resume prior diet and home medication.        - Continue PPI 40mg twice daily 30 min pre meals. Do not stop this medication.    On discharge, please send refills.        - Can resume anticoagulation but watch Hgb and trend H/H for 1-2 days on    anticoagulation.        - Rest of plan as per primary team.                Kamlesh Joseph MD

## 2024-11-29 NOTE — PROGRESS NOTE ADULT - ASSESSMENT
Fevers - improved  Leukocytosis - normalized  No obvious source at this time.    Plan -   ·	Cont Zosyn 3.375 gms iv q8hrs empirically for now.  ·	s/p EGD today Fevers - improved  Leukocytosis - normalized  No obvious source at this time.    Plan -   ·	Cont Zosyn 3.375 gms iv q8hrs empirically till tomorrow then can DC all antibiotics   ·	s/p EGD today - shows duodenal ulcer.  No vessels seen.    Fevers - improved  Leukocytosis - normalized  No obvious source at this time.    Plan -   ·	Cont Zosyn 3.375 gms iv q8hrs empirically till tomorrow then can DC all antibiotics   ·	s/p EGD today - shows 2cm duodenal ulcer.  No vessels seen.    Fevers - improved  Leukocytosis - normalized  No obvious source at this time.    Plan -   ·	Cont Zosyn 3.375 gms iv q8hrs empirically till tomorrow then can DC all antibiotics   ·	s/p EGD today - shows 2cm duodenal ulcer.   ·	reconsult prn.

## 2024-11-29 NOTE — PROGRESS NOTE ADULT - SUBJECTIVE AND OBJECTIVE BOX
INTERVAL HPI/OVERNIGHT EVENTS:  Patient seen,stable,no acute issues  VITAL SIGNS:  T(F): 98.6 (11-29-24 @ 05:02)  HR: 111 (11-29-24 @ 05:02)  BP: 107/72 (11-29-24 @ 05:02)  RR: 17 (11-29-24 @ 05:02)  SpO2: 94% (11-29-24 @ 05:02)  Wt(kg): --    PHYSICAL EXAM:  awake  Constitutional:  Eyes:  ENMT:perrla  Neck:  Respiratory:clear  Cardiovascular:s1s2,m-none  Gastrointestinal:soft,bs pos  Extremities:  Vascular:  Neurological:no focal deficit  Musculoskeletal:    MEDICATIONS  (STANDING):  atorvastatin 10 milliGRAM(s) Oral at bedtime  gabapentin 300 milliGRAM(s) Oral daily  glucagon  Injectable 1 milliGRAM(s) IntraMuscular once  insulin lispro (ADMELOG) corrective regimen sliding scale   SubCutaneous every 6 hours  lactated ringers. 1000 milliLiter(s) (75 mL/Hr) IV Continuous <Continuous>  pantoprazole  Injectable 40 milliGRAM(s) IV Push every 12 hours  piperacillin/tazobactam IVPB.. 3.375 Gram(s) IV Intermittent every 8 hours    MEDICATIONS  (PRN):  acetaminophen     Tablet .. 650 milliGRAM(s) Oral every 6 hours PRN Temp greater or equal to 38C (100.4F), Mild Pain (1 - 3)  melatonin 3 milliGRAM(s) Oral at bedtime PRN Insomnia      Allergies    No Known Allergies    Intolerances        LABS:                        9.9    10.35 )-----------( 422      ( 29 Nov 2024 06:21 )             29.6     11-29    137  |  102  |  10  ----------------------------<  278[H]  3.4[L]   |  28  |  0.50    Ca    9.1      29 Nov 2024 06:21  Phos  2.4     11-29  Mg     1.7     11-29    TPro  6.3  /  Alb  2.1[L]  /  TBili  0.3  /  DBili  x   /  AST  21  /  ALT  52  /  AlkPhos  79  11-29    PT/INR - ( 29 Nov 2024 06:21 )   PT: 15.3 sec;   INR: 1.31 ratio         PTT - ( 29 Nov 2024 06:21 )  PTT:27.5 sec  Urinalysis Basic - ( 29 Nov 2024 06:21 )    Color: x / Appearance: x / SG: x / pH: x  Gluc: 278 mg/dL / Ketone: x  / Bili: x / Urobili: x   Blood: x / Protein: x / Nitrite: x   Leuk Esterase: x / RBC: x / WBC x   Sq Epi: x / Non Sq Epi: x / Bacteria: x        RADIOLOGY & ADDITIONAL TESTS:      ASSESSMENT:  85yo, F, From [facility], usually lives at home with family, ambulates with a cane and PMH of DM, HTN, HLD and dementia (baseline AOx2-3).  Presents to the ED for fever and melena.  Admitted GI bleed and sepsis tuttle.           Problem/Plan - 1:  ·  Problem: Melena-stable hemodinamicly   · h/h stable   - Continue to monitor CBC.  - Maintain active T&S  - C/w Protonix BID  - GI-Dr. Glass planning for EGD on friday if family agrees,otherwise d/c back to str  -supplement k     Problem/Plan - 2:  ·  Problem: Sepsis on admittion-stable  clinically  ·  Plan:   - Currently on Zosyn.  ? ID-f/u   - Bld cx's pending-f/u results   - Currently on IVF      Problem/Plan - 3:  ·  Problem: DM (diabetes mellitus).   ·  Plan:  H/o DM on Metformin & Mounjaro  - C/w HSS      Problem/Plan - 4:  ·  Problem: HTN (hypertension).   ·  Plan:   - Currently holding BP meds iso possible GI bleed  - Continue to monitor BP and adjust medication accordingly      Problem/Plan - 5:  ·  Problem: HLD (hyperlipidemia).   ·  Plan:   - C/w Atorvastatin    Problem/Plan - 6:  ·  Problem: Electrolyte Abnormalities.   ·  Plan:  - Supplemented today  - Continue to monitor      Problem/Plan - 7:  ·  Problem: Prophylactic measure.   ·  Plan:   - C/w SCDs   - C/w Protonix BID.

## 2024-11-29 NOTE — PROGRESS NOTE ADULT - PROBLEM SELECTOR PLAN 2
- EGD unavailable until Fri., 11/29  - P/w two episodes of melena at rehab.  Recently dc'd from [facility] 11/18-11/23 for GI bleed and melena  - Continue to monitor CBC.  11/26 CBC repeated and improved.  AM CBC slight decline but stable.    - Maintain active T&S  - Currently on regular diet.  Monitor for tolerance  - C/w Protonix BID  - GI-Dr. Glass writing for transfer to another facility
- EGD unavailable until Fri., 11/29  - P/w two episodes of melena at rehab.  Recently dc'd from [facility] 11/18-11/23 for GI bleed and melena  - Continue to monitor CBC.     - Maintain active T&S  - Currently on regular diet.  Monitor for tolerance  - C/w Protonix BID  - GI-Dr. Glass following
resolved   - EGD 11/29:  2 cm clean based ulcer in duodenal sweep. No vessels seen.  At least 2 healed ulcers, 5 cm hiatal hernia.    - continue PPI 40mg twice daily 30 min pre meals. Do not stop this medication.  On discharge, please send refills.  - resume anticoagulation   - monitor  Hgb and trend H/H for 1-2 days on  anticoagulation.  -Dr Maria Luisa MURRAY following

## 2024-11-29 NOTE — PROGRESS NOTE ADULT - ASSESSMENT
1. Melena  2. Anemia  3. Duodenal ulcer  4. S/p EGD  5. Duodenal ulcer (Healing)  6. Hiatal hernia    Suggestions:    1. Monitor H/H  2. Transfuse PRBC as needed  3. Protonix IV  4. Avoid NSAID  5. Follow up path    6. Diet as tolerated  7. DVT prophylaxis

## 2024-11-30 LAB
ANION GAP SERPL CALC-SCNC: 7 MMOL/L — SIGNIFICANT CHANGE UP (ref 5–17)
BUN SERPL-MCNC: 11 MG/DL — SIGNIFICANT CHANGE UP (ref 7–18)
CALCIUM SERPL-MCNC: 9.2 MG/DL — SIGNIFICANT CHANGE UP (ref 8.4–10.5)
CHLORIDE SERPL-SCNC: 102 MMOL/L — SIGNIFICANT CHANGE UP (ref 96–108)
CO2 SERPL-SCNC: 27 MMOL/L — SIGNIFICANT CHANGE UP (ref 22–31)
CREAT SERPL-MCNC: 0.47 MG/DL — LOW (ref 0.5–1.3)
CULTURE RESULTS: SIGNIFICANT CHANGE UP
CULTURE RESULTS: SIGNIFICANT CHANGE UP
EGFR: 93 ML/MIN/1.73M2 — SIGNIFICANT CHANGE UP
GLUCOSE BLDC GLUCOMTR-MCNC: 228 MG/DL — HIGH (ref 70–99)
GLUCOSE BLDC GLUCOMTR-MCNC: 289 MG/DL — HIGH (ref 70–99)
GLUCOSE BLDC GLUCOMTR-MCNC: 306 MG/DL — HIGH (ref 70–99)
GLUCOSE BLDC GLUCOMTR-MCNC: 317 MG/DL — HIGH (ref 70–99)
GLUCOSE SERPL-MCNC: 289 MG/DL — HIGH (ref 70–99)
HCT VFR BLD CALC: 30 % — LOW (ref 34.5–45)
HGB BLD-MCNC: 10.1 G/DL — LOW (ref 11.5–15.5)
MCHC RBC-ENTMCNC: 29.9 PG — SIGNIFICANT CHANGE UP (ref 27–34)
MCHC RBC-ENTMCNC: 33.7 G/DL — SIGNIFICANT CHANGE UP (ref 32–36)
MCV RBC AUTO: 88.8 FL — SIGNIFICANT CHANGE UP (ref 80–100)
NRBC # BLD: 0 /100 WBCS — SIGNIFICANT CHANGE UP (ref 0–0)
PLATELET # BLD AUTO: 435 K/UL — HIGH (ref 150–400)
POTASSIUM SERPL-MCNC: 3.8 MMOL/L — SIGNIFICANT CHANGE UP (ref 3.5–5.3)
POTASSIUM SERPL-SCNC: 3.8 MMOL/L — SIGNIFICANT CHANGE UP (ref 3.5–5.3)
RBC # BLD: 3.38 M/UL — LOW (ref 3.8–5.2)
RBC # FLD: 13.7 % — SIGNIFICANT CHANGE UP (ref 10.3–14.5)
SODIUM SERPL-SCNC: 136 MMOL/L — SIGNIFICANT CHANGE UP (ref 135–145)
SPECIMEN SOURCE: SIGNIFICANT CHANGE UP
SPECIMEN SOURCE: SIGNIFICANT CHANGE UP
WBC # BLD: 10.43 K/UL — SIGNIFICANT CHANGE UP (ref 3.8–10.5)
WBC # FLD AUTO: 10.43 K/UL — SIGNIFICANT CHANGE UP (ref 3.8–10.5)

## 2024-11-30 PROCEDURE — 93010 ELECTROCARDIOGRAM REPORT: CPT

## 2024-11-30 RX ADMIN — Medication 3: at 12:39

## 2024-11-30 RX ADMIN — PIPERACILLIN SODIUM AND TAZOBACTAM SODIUM 25 GRAM(S): 4; .5 INJECTION, POWDER, LYOPHILIZED, FOR SOLUTION INTRAVENOUS at 07:10

## 2024-11-30 RX ADMIN — Medication 4: at 07:09

## 2024-11-30 RX ADMIN — ACETAMINOPHEN, DIPHENHYDRAMINE HCL, PHENYLEPHRINE HCL 3 MILLIGRAM(S): 325; 25; 5 TABLET ORAL at 22:10

## 2024-11-30 RX ADMIN — PANTOPRAZOLE SODIUM 40 MILLIGRAM(S): 40 TABLET, DELAYED RELEASE ORAL at 17:12

## 2024-11-30 RX ADMIN — PANTOPRAZOLE SODIUM 40 MILLIGRAM(S): 40 TABLET, DELAYED RELEASE ORAL at 07:09

## 2024-11-30 RX ADMIN — Medication 2: at 00:11

## 2024-11-30 RX ADMIN — Medication 10 MILLIGRAM(S): at 22:09

## 2024-11-30 RX ADMIN — GABAPENTIN 300 MILLIGRAM(S): 300 CAPSULE ORAL at 11:10

## 2024-11-30 RX ADMIN — Medication 2: at 17:50

## 2024-11-30 NOTE — PROGRESS NOTE ADULT - SUBJECTIVE AND OBJECTIVE BOX
INTERVAL HPI/OVERNIGHT EVENTS:  Patient seen,stable ,s/p egd  VITAL SIGNS:  T(F): 98.2 (11-30-24 @ 05:06)  HR: 99 (11-30-24 @ 05:06)  BP: 145/82 (11-30-24 @ 05:06)  RR: 18 (11-30-24 @ 05:06)  SpO2: 97% (11-30-24 @ 05:06)  Wt(kg): --    PHYSICAL EXAM:  awake,mild confusion  Constitutional:  Eyes:  ENMT:perrla  Neck:  Respiratory:clear  Cardiovascular:s1s2,m-none  Gastrointestinal:soft,bs pos  Extremities:  Vascular:  Neurological:no focal deficit  Musculoskeletal:    MEDICATIONS  (STANDING):  atorvastatin 10 milliGRAM(s) Oral at bedtime  gabapentin 300 milliGRAM(s) Oral daily  glucagon  Injectable 1 milliGRAM(s) IntraMuscular once  insulin lispro (ADMELOG) corrective regimen sliding scale   SubCutaneous every 6 hours  lactated ringers. 1000 milliLiter(s) (75 mL/Hr) IV Continuous <Continuous>  pantoprazole    Tablet 40 milliGRAM(s) Oral every 12 hours  piperacillin/tazobactam IVPB.. 3.375 Gram(s) IV Intermittent every 8 hours    MEDICATIONS  (PRN):  acetaminophen     Tablet .. 650 milliGRAM(s) Oral every 6 hours PRN Temp greater or equal to 38C (100.4F), Mild Pain (1 - 3)  melatonin 3 milliGRAM(s) Oral at bedtime PRN Insomnia      Allergies    No Known Allergies    Intolerances        LABS:                        10.1   10.43 )-----------( 435      ( 30 Nov 2024 06:31 )             30.0     11-30    136  |  102  |  11  ----------------------------<  289[H]  3.8   |  27  |  0.47[L]    Ca    9.2      30 Nov 2024 06:31  Phos  2.4     11-29  Mg     1.7     11-29    TPro  6.3  /  Alb  2.1[L]  /  TBili  0.3  /  DBili  x   /  AST  21  /  ALT  52  /  AlkPhos  79  11-29    PT/INR - ( 29 Nov 2024 06:21 )   PT: 15.3 sec;   INR: 1.31 ratio         PTT - ( 29 Nov 2024 06:21 )  PTT:27.5 sec  Urinalysis Basic - ( 30 Nov 2024 06:31 )    Color: x / Appearance: x / SG: x / pH: x  Gluc: 289 mg/dL / Ketone: x  / Bili: x / Urobili: x   Blood: x / Protein: x / Nitrite: x   Leuk Esterase: x / RBC: x / WBC x   Sq Epi: x / Non Sq Epi: x / Bacteria: x        RADIOLOGY & ADDITIONAL TESTS:      ASSESSMENT:  85yo, F, From [facility], usually lives at home with family, ambulates with a cane and PMH of DM, HTN, HLD and dementia (baseline AOx2-3).  Presents to the ED for fever and melena.  Admitted GI bleed and sepsis tuttle.           Problem/Plan - 1:  ·  Problem: Melena-stable hemodinamicly   · h/h stable   - Continue to monitor CBC.  - Maintain active T&S  - C/w Protonix BID  - s/p egd-results noticed  d/c planning back to str     Problem/Plan - 2:  ·  Problem: Sepsis on admittion-stable  clinically  ·  Plan:   - Currently on Zosyn.  - Bld cx's pending-f/u results        Problem/Plan - 3:  ·  Problem: DM (diabetes mellitus).   ·  Plan:  H/o DM on Metformin & Mounjaro  - C/w HSS      Problem/Plan - 4:  ·  Problem: HTN (hypertension).   ·  Plan:   - Currently holding BP meds iso possible GI bleed  - Continue to monitor BP and adjust medication accordingly      Problem/Plan - 5:  ·  Problem: HLD (hyperlipidemia).   ·  Plan:   - C/w Atorvastatin    Problem/Plan - 6:  ·  Problem: Electrolyte Abnormalities.   ·  Plan:  - Supplemented today  - Continue to monitor      Problem/Plan - 7:  ·  Problem: Prophylactic measure.   ·  Plan:   - C/w SCDs   - C/w Protonix BID.

## 2024-11-30 NOTE — PROGRESS NOTE ADULT - ASSESSMENT
1. Melena (stopped)  2. Anemia (H/H stable)  3. Duodenal ulcer  4. S/p EGD  5. Duodenal ulcer (Healing)  6. Hiatal hernia    Suggestions:    1. Monitor H/H  2. Transfuse PRBC as needed  3. Protonix IV  4. Avoid NSAID  5. Follow up path  (pending)  6. Diet as tolerated  7. Treat for H. Pylori if positive  8. DVT prophylaxis

## 2024-11-30 NOTE — PROGRESS NOTE ADULT - SUBJECTIVE AND OBJECTIVE BOX
[   ] ICU                                          [   ] CCU                                      [ X  ] Medical Floor    Patient is a 84 year old female with GI bleeding and anemia. GI consulted to evaluate.      Patient is a 84 year old female, from rehab, usually lives at home, ambulates with a cane with past medical history significant for DM, HTN, Hyperlipidemia and dementia presented to the emergency room with few episodes of melena and fever. patient recently had EGD for melena where she was found to have duodenal ulcer. No abdominal pain, nausea, vomiting, hematemesis, hematochezia, hemoptysis, chest pain, SOB, cough, hematuria, dysuria or diarrhea reported.    Patient is comfortable. No new complaints reported, No abdominal pain, N/V, hematemesis, hematochezia, melena, fever, chills, chest pain, SOB, cough or diarrhea reported.      PAIN MANAGEMENT:  Pain Scale:                0 /10  Pain Location:         PAST MEDICAL HISTORY    DM (diabetes mellitus)    HTN (hypertension)    Hyperlipidemia    Dementia        PAST SURGICAL HISTORY    No significant past surgical history reported        Allergies    No Known Allergies    Intolerances  None         SOCIAL HISTORY  Advanced Directives:       [ X ] Full Code       [  ] DNR  Marital Status:         [  ] M      [ X ] S      [  ] D       [  ] W  Children:       [ X ] Yes      [  ] No  Occupation:        [  ] Employed       [ X ] Unemployed       [  ] Retired  Diet:       [ X ] Regular       [  ] PEG feeding          [  ] NG tube feeding  Drug Use:           [ X ] No           [  ] Yes  Alcohol:           [X  ] No             [  ] Yes (socially)         [  ] Yes (chronic)  Tobacco:           [  ] Yes           [ X ] No      FAMILY HISTORY  [ X ] Heart Disease            [ X ] Diabetes             [ X ] HTN             [  ] Colon Cancer             [  ] Stomach Cancer              [  ] Pancreatic Cancer        VITALS   Vital Signs Last 24 Hrs  T(C): 36.8 (11-30-24 @ 05:06), Max: 36.8 (11-30-24 @ 05:06)  T(F): 98.2 (11-30-24 @ 05:06), Max: 98.2 (11-30-24 @ 05:06)  HR: 99 (11-30-24 @ 05:06) (99 - 109)  BP: 145/82 (11-30-24 @ 05:06) (133/97 - 151/77)   RR: 18 (11-30-24 @ 05:06) (18 - 18)  SpO2: 97% (11-30-24 @ 05:06) (95% - 97%)    MEDICATIONS  (STANDING):  atorvastatin 10 milliGRAM(s) Oral at bedtime  gabapentin 300 milliGRAM(s) Oral daily  glucagon  Injectable 1 milliGRAM(s) IntraMuscular once  insulin lispro (ADMELOG) corrective regimen sliding scale   SubCutaneous every 6 hours  lactated ringers. 1000 milliLiter(s) (75 mL/Hr) IV Continuous <Continuous>  pantoprazole    Tablet 40 milliGRAM(s) Oral every 12 hours    MEDICATIONS  (PRN):  acetaminophen     Tablet .. 650 milliGRAM(s) Oral every 6 hours PRN Temp greater or equal to 38C (100.4F), Mild Pain (1 - 3)  melatonin 3 milliGRAM(s) Oral at bedtime PRN Insomnia                            10.1   10.43 )-----------( 435      ( 30 Nov 2024 06:31 )             30.0       11-30    136  |  102  |  11  ----------------------------<  289[H]  3.8   |  27  |  0.47[L]    Ca    9.2      30 Nov 2024 06:31  Phos  2.4     11-29  Mg     1.7     11-29    TPro  6.3  /  Alb  2.1[L]  /  TBili  0.3  /  DBili  x   /  AST  21  /  ALT  52  /  AlkPhos  79  11-29      PT/INR - ( 29 Nov 2024 06:21 )   PT: 15.3 sec;   INR: 1.31 ratio         PTT - ( 29 Nov 2024 06:21 )  PTT:27.5 sec

## 2024-12-01 LAB
ANION GAP SERPL CALC-SCNC: 8 MMOL/L — SIGNIFICANT CHANGE UP (ref 5–17)
APPEARANCE UR: ABNORMAL
BACTERIA # UR AUTO: ABNORMAL /HPF
BILIRUB UR-MCNC: NEGATIVE — SIGNIFICANT CHANGE UP
BUN SERPL-MCNC: 13 MG/DL — SIGNIFICANT CHANGE UP (ref 7–18)
CALCIUM SERPL-MCNC: 9.7 MG/DL — SIGNIFICANT CHANGE UP (ref 8.4–10.5)
CHLORIDE SERPL-SCNC: 101 MMOL/L — SIGNIFICANT CHANGE UP (ref 96–108)
CO2 SERPL-SCNC: 28 MMOL/L — SIGNIFICANT CHANGE UP (ref 22–31)
COLOR SPEC: YELLOW — SIGNIFICANT CHANGE UP
COMMENT - URINE: SIGNIFICANT CHANGE UP
CREAT SERPL-MCNC: 0.56 MG/DL — SIGNIFICANT CHANGE UP (ref 0.5–1.3)
DIFF PNL FLD: ABNORMAL
EGFR: 89 ML/MIN/1.73M2 — SIGNIFICANT CHANGE UP
EPI CELLS # UR: PRESENT
GLUCOSE BLDC GLUCOMTR-MCNC: 234 MG/DL — HIGH (ref 70–99)
GLUCOSE BLDC GLUCOMTR-MCNC: 235 MG/DL — HIGH (ref 70–99)
GLUCOSE BLDC GLUCOMTR-MCNC: 241 MG/DL — HIGH (ref 70–99)
GLUCOSE BLDC GLUCOMTR-MCNC: 245 MG/DL — HIGH (ref 70–99)
GLUCOSE BLDC GLUCOMTR-MCNC: 293 MG/DL — HIGH (ref 70–99)
GLUCOSE BLDC GLUCOMTR-MCNC: 342 MG/DL — HIGH (ref 70–99)
GLUCOSE SERPL-MCNC: 240 MG/DL — HIGH (ref 70–99)
GLUCOSE UR QL: >=1000 MG/DL
HCT VFR BLD CALC: 31 % — LOW (ref 34.5–45)
HGB BLD-MCNC: 10.5 G/DL — LOW (ref 11.5–15.5)
KETONES UR-MCNC: 80 MG/DL
LACTATE SERPL-SCNC: 0.9 MMOL/L — SIGNIFICANT CHANGE UP (ref 0.7–2)
LEUKOCYTE ESTERASE UR-ACNC: ABNORMAL
MAGNESIUM SERPL-MCNC: 1.5 MG/DL — LOW (ref 1.6–2.6)
MCHC RBC-ENTMCNC: 30.3 PG — SIGNIFICANT CHANGE UP (ref 27–34)
MCHC RBC-ENTMCNC: 33.9 G/DL — SIGNIFICANT CHANGE UP (ref 32–36)
MCV RBC AUTO: 89.3 FL — SIGNIFICANT CHANGE UP (ref 80–100)
NITRITE UR-MCNC: NEGATIVE — SIGNIFICANT CHANGE UP
NRBC # BLD: 0 /100 WBCS — SIGNIFICANT CHANGE UP (ref 0–0)
PH UR: 6 — SIGNIFICANT CHANGE UP (ref 5–8)
PHOSPHATE SERPL-MCNC: 2.5 MG/DL — SIGNIFICANT CHANGE UP (ref 2.5–4.5)
PLATELET # BLD AUTO: 521 K/UL — HIGH (ref 150–400)
POTASSIUM SERPL-MCNC: 3.8 MMOL/L — SIGNIFICANT CHANGE UP (ref 3.5–5.3)
POTASSIUM SERPL-SCNC: 3.8 MMOL/L — SIGNIFICANT CHANGE UP (ref 3.5–5.3)
PROT UR-MCNC: 100 MG/DL
RBC # BLD: 3.47 M/UL — LOW (ref 3.8–5.2)
RBC # FLD: 13.5 % — SIGNIFICANT CHANGE UP (ref 10.3–14.5)
RBC CASTS # UR COMP ASSIST: 10 /HPF — HIGH (ref 0–4)
SODIUM SERPL-SCNC: 137 MMOL/L — SIGNIFICANT CHANGE UP (ref 135–145)
SP GR SPEC: 1.04 — HIGH (ref 1–1.03)
UROBILINOGEN FLD QL: 0.2 MG/DL — SIGNIFICANT CHANGE UP (ref 0.2–1)
WBC # BLD: 10.08 K/UL — SIGNIFICANT CHANGE UP (ref 3.8–10.5)
WBC # FLD AUTO: 10.08 K/UL — SIGNIFICANT CHANGE UP (ref 3.8–10.5)
WBC UR QL: 8 /HPF — HIGH (ref 0–5)

## 2024-12-01 RX ORDER — METOPROLOL TARTRATE 100 MG/1
25 TABLET, FILM COATED ORAL EVERY 12 HOURS
Refills: 0 | Status: DISCONTINUED | OUTPATIENT
Start: 2024-12-01 | End: 2024-12-02

## 2024-12-01 RX ORDER — ENOXAPARIN SODIUM 30 MG/.3ML
40 INJECTION SUBCUTANEOUS EVERY 24 HOURS
Refills: 0 | Status: DISCONTINUED | OUTPATIENT
Start: 2024-12-01 | End: 2024-12-02

## 2024-12-01 RX ORDER — INSULIN GLARGINE 100 [IU]/ML
5 INJECTION, SOLUTION SUBCUTANEOUS AT BEDTIME
Refills: 0 | Status: DISCONTINUED | OUTPATIENT
Start: 2024-12-01 | End: 2024-12-02

## 2024-12-01 RX ORDER — 0.9 % SODIUM CHLORIDE 0.9 %
1000 INTRAVENOUS SOLUTION INTRAVENOUS
Refills: 0 | Status: DISCONTINUED | OUTPATIENT
Start: 2024-12-01 | End: 2024-12-02

## 2024-12-01 RX ADMIN — Medication 10 MILLIGRAM(S): at 22:39

## 2024-12-01 RX ADMIN — Medication 4: at 01:19

## 2024-12-01 RX ADMIN — ACETAMINOPHEN, DIPHENHYDRAMINE HCL, PHENYLEPHRINE HCL 3 MILLIGRAM(S): 325; 25; 5 TABLET ORAL at 22:39

## 2024-12-01 RX ADMIN — ENOXAPARIN SODIUM 40 MILLIGRAM(S): 30 INJECTION SUBCUTANEOUS at 11:30

## 2024-12-01 RX ADMIN — Medication 2: at 17:16

## 2024-12-01 RX ADMIN — METOPROLOL TARTRATE 25 MILLIGRAM(S): 100 TABLET, FILM COATED ORAL at 17:17

## 2024-12-01 RX ADMIN — GABAPENTIN 300 MILLIGRAM(S): 300 CAPSULE ORAL at 11:30

## 2024-12-01 RX ADMIN — Medication 2 UNIT(S): at 17:53

## 2024-12-01 RX ADMIN — PANTOPRAZOLE SODIUM 40 MILLIGRAM(S): 40 TABLET, DELAYED RELEASE ORAL at 06:24

## 2024-12-01 RX ADMIN — Medication 2: at 06:26

## 2024-12-01 RX ADMIN — Medication 2: at 12:04

## 2024-12-01 RX ADMIN — PANTOPRAZOLE SODIUM 40 MILLIGRAM(S): 40 TABLET, DELAYED RELEASE ORAL at 17:17

## 2024-12-01 RX ADMIN — METOPROLOL TARTRATE 25 MILLIGRAM(S): 100 TABLET, FILM COATED ORAL at 11:30

## 2024-12-01 RX ADMIN — ACETAMINOPHEN 500MG 650 MILLIGRAM(S): 500 TABLET, COATED ORAL at 11:24

## 2024-12-01 RX ADMIN — INSULIN GLARGINE 5 UNIT(S): 100 INJECTION, SOLUTION SUBCUTANEOUS at 22:41

## 2024-12-01 RX ADMIN — ACETAMINOPHEN 500MG 650 MILLIGRAM(S): 500 TABLET, COATED ORAL at 10:20

## 2024-12-01 RX ADMIN — Medication 2: at 22:40

## 2024-12-01 NOTE — CHART NOTE - NSCHARTNOTEFT_GEN_A_CORE
85 y/o F, From [facility], usually lives at home with family, ambulates with a cane and PMHx of DM, HTN, HLD and dementia (baseline AOx2-3).  Presents to the ED for fever and melena. Admitted GI bleed and sepsis w/u. GI consulted, s/p  EGD 11/29. Pt found to have 2 cm clean based ulcer in duodenal sweep. No vessels seen. At least 2 healed ulcers, 5 cm hiatal hernia. PPI 40mg twice daily continued   Tolerating diet, no further bleeding    #sepsis  - completed Zosyn course as per ID recs  - bcx, UA and CXR negative    #tachycardic 110-120  - likely due to BB rebound  - afebrile, no WBC  - pt was on metoprolol 50mg BID -- was on hold iso bleeding  - no further bleeding. BP stable.  - resume lopressor 25mg BID -- uptitrate if BP remains stable  - discussed with Dr Leone    Started on Lovenox ppx. As per GI, can resume AC with hgb monitoring in 1-2 days. Discussed with Dr Leone

## 2024-12-01 NOTE — CHART NOTE - NSCHARTNOTEFT_GEN_A_CORE
EVENT:   12/01/24, 12 : 17am, Patient's HR - 124, BP -146/79,. RR -18, O2sat - 94%RA, T- 100F. Stat EKG showed Sinus Tachycardia. BP meds. on hold (possible lower GIB). Cooling measures and IV Fluid: LR @ 75 ml/hr ordered.    HPI:  Patient is a 84 year old female, from rehab, usually lives at home, ambulates with a cane and PMH significant for DM, HTN, HLD and dementia (baseline AOx2-3) presents to the ED for fever and melena after recently being admitted to [facility] for melena. Significant collateral is obtained from daughters at bedside. Report that patient had two episodes of dark black stools at rehab today. Unable to quantify as episode was not witness by daughters and patient is unable to recall. No bright red blood as per daughters. Patient also reports fever since the morning of 11/24/2024. She denies any other changes to bowel movements including hematochezia, diarrhea and constipation.   OBJECTIVE:  Vital Signs Last 24 Hrs  T(C): 37.8 (01 Dec 2024 00:08), Max: 37.8 (01 Dec 2024 00:08)  T(F): 100 (01 Dec 2024 00:08), Max: 100 (01 Dec 2024 00:08)  HR: 124 (01 Dec 2024 00:08) (99 - 128)  BP: 146/79 (01 Dec 2024 00:08) (145/82 - 150/67)  BP(mean): --  RR: 18 (01 Dec 2024 00:08) (18 - 18)  SpO2: 94% (01 Dec 2024 00:08) (94% - 97%)      PLAN:   - Follow-up morning labs,   - Monitor patient VS,   - Continue present treatment and care.

## 2024-12-01 NOTE — PROGRESS NOTE ADULT - SUBJECTIVE AND OBJECTIVE BOX
INTERVAL HPI/OVERNIGHT EVENTS:  Patient seen,stable ,no acute issues  VITAL SIGNS:  T(F): 99.3 (12-01-24 @ 09:22)  HR: 110 (12-01-24 @ 09:22)  BP: 145/80 (12-01-24 @ 09:22)  RR: 18 (12-01-24 @ 09:22)  SpO2: 95% (12-01-24 @ 09:22)  Wt(kg): --    PHYSICAL EXAM:  awake  Constitutional:  Eyes:  ENMT:perrla  Neck:  Respiratory:clear  Cardiovascular:s1s2,m-none  Gastrointestinal:soft,bs pos  Extremities:  Vascular:  Neurological:no focal deficit  Musculoskeletal:    MEDICATIONS  (STANDING):  atorvastatin 10 milliGRAM(s) Oral at bedtime  enoxaparin Injectable 40 milliGRAM(s) SubCutaneous every 24 hours  gabapentin 300 milliGRAM(s) Oral daily  glucagon  Injectable 1 milliGRAM(s) IntraMuscular once  insulin lispro (ADMELOG) corrective regimen sliding scale   SubCutaneous every 6 hours  lactated ringers. 1000 milliLiter(s) (75 mL/Hr) IV Continuous <Continuous>  lactated ringers. 1000 milliLiter(s) (75 mL/Hr) IV Continuous <Continuous>  metoprolol tartrate 25 milliGRAM(s) Oral every 12 hours  pantoprazole    Tablet 40 milliGRAM(s) Oral every 12 hours    MEDICATIONS  (PRN):  acetaminophen     Tablet .. 650 milliGRAM(s) Oral every 6 hours PRN Temp greater or equal to 38C (100.4F), Mild Pain (1 - 3)  melatonin 3 milliGRAM(s) Oral at bedtime PRN Insomnia      Allergies    No Known Allergies    Intolerances        LABS:                        10.5   10.08 )-----------( 521      ( 01 Dec 2024 05:31 )             31.0     12-01    137  |  101  |  13  ----------------------------<  240[H]  3.8   |  28  |  0.56    Ca    9.7      01 Dec 2024 05:31  Phos  2.5     12-01  Mg     1.5     12-01        Urinalysis Basic - ( 01 Dec 2024 05:31 )    Color: x / Appearance: x / SG: x / pH: x  Gluc: 240 mg/dL / Ketone: x  / Bili: x / Urobili: x   Blood: x / Protein: x / Nitrite: x   Leuk Esterase: x / RBC: x / WBC x   Sq Epi: x / Non Sq Epi: x / Bacteria: x        RADIOLOGY & ADDITIONAL TESTS:        ASSESSMENT:  85yo, F, From [facility], usually lives at home with family, ambulates with a cane and PMH of DM, HTN, HLD and dementia (baseline AOx2-3).  Presents to the ED for fever and melena.  Admitted GI bleed and sepsis tuttle.           Problem/Plan - 1:  ·  Problem: Melena-stable hemodinamicly   · h/h stable   -resume diet  - Continue to monitor CBC.  - Maintain active T&S  - C/w Protonix BID  - s/p egd-results noticed  d/c planning back to str tomorrow     Problem/Plan - 2:  ·  Problem: Sepsis on admittion-stable  clinically  ·  Plan:   - Currently on Zosyn.  - Bld cx's pending-f/u results        Problem/Plan - 3:  ·  Problem: DM (diabetes mellitus).   ·  Plan:  H/o DM on Metformin & Mounjaro  - C/w HSS      Problem/Plan - 4:  ·  Problem: HTN (hypertension).   ·  Plan:   - Currently holding BP meds iso possible GI bleed  - Continue to monitor BP and adjust medication accordingly      Problem/Plan - 5:  ·  Problem: HLD (hyperlipidemia).   ·  Plan:   - C/w Atorvastatin    Problem/Plan - 6:  ·  Problem: Electrolyte Abnormalities.   ·  Plan:  - Supplemented today  - Continue to monitor      Problem/Plan - 7:  ·  Problem: Prophylactic measure.   ·  Plan:   - C/w SCDs   - C/w Protonix BID.

## 2024-12-01 NOTE — PROGRESS NOTE ADULT - SUBJECTIVE AND OBJECTIVE BOX
[   ] ICU                                          [   ] CCU                                      [ X  ] Medical Floor    Patient is a 84 year old female with GI bleeding and anemia. GI consulted to evaluate.      Patient is a 84 year old female, from rehab, usually lives at home, ambulates with a cane with past medical history significant for DM, HTN, Hyperlipidemia and dementia presented to the emergency room with few episodes of melena and fever. patient recently had EGD for melena where she was found to have duodenal ulcer. No abdominal pain, nausea, vomiting, hematemesis, hematochezia, hemoptysis, chest pain, SOB, cough, hematuria, dysuria or diarrhea reported.    Patient is comfortable. No new complaints reported, No abdominal pain, N/V, hematemesis, hematochezia, melena, fever, chills, chest pain, SOB, cough or diarrhea reported.      PAIN MANAGEMENT:  Pain Scale:                0 /10  Pain Location:         PAST MEDICAL HISTORY    DM (diabetes mellitus)    HTN (hypertension)    Hyperlipidemia    Dementia        PAST SURGICAL HISTORY    No significant past surgical history reported        Allergies    No Known Allergies    Intolerances  None         SOCIAL HISTORY  Advanced Directives:       [ X ] Full Code       [  ] DNR  Marital Status:         [  ] M      [ X ] S      [  ] D       [  ] W  Children:       [ X ] Yes      [  ] No  Occupation:        [  ] Employed       [ X ] Unemployed       [  ] Retired  Diet:       [ X ] Regular       [  ] PEG feeding          [  ] NG tube feeding  Drug Use:           [ X ] No           [  ] Yes  Alcohol:           [X  ] No             [  ] Yes (socially)         [  ] Yes (chronic)  Tobacco:           [  ] Yes           [ X ] No      FAMILY HISTORY  [ X ] Heart Disease            [ X ] Diabetes             [ X ] HTN             [  ] Colon Cancer             [  ] Stomach Cancer              [  ] Pancreatic Cancer        VITALS   Vital Signs Last 24 Hrs  T(C): 37.4 (12-01-24 @ 09:22), Max: 37.8 (12-01-24 @ 00:08)  T(F): 99.3 (12-01-24 @ 09:22), Max: 100 (12-01-24 @ 00:08)  HR: 110 (12-01-24 @ 09:22) (110 - 128)  BP: 145/80 (12-01-24 @ 09:22) (101/62 - 150/67)   RR: 18 (12-01-24 @ 09:22) (18 - 18)  SpO2: 95% (12-01-24 @ 09:22) (94% - 95%)        MEDICATIONS  (STANDING):  atorvastatin 10 milliGRAM(s) Oral at bedtime  enoxaparin Injectable 40 milliGRAM(s) SubCutaneous every 24 hours  gabapentin 300 milliGRAM(s) Oral daily  glucagon  Injectable 1 milliGRAM(s) IntraMuscular once  insulin lispro (ADMELOG) corrective regimen sliding scale   SubCutaneous every 6 hours  lactated ringers. 1000 milliLiter(s) (75 mL/Hr) IV Continuous <Continuous>  lactated ringers. 1000 milliLiter(s) (75 mL/Hr) IV Continuous <Continuous>  metoprolol tartrate 25 milliGRAM(s) Oral every 12 hours  pantoprazole    Tablet 40 milliGRAM(s) Oral every 12 hours    MEDICATIONS  (PRN):  acetaminophen     Tablet .. 650 milliGRAM(s) Oral every 6 hours PRN Temp greater or equal to 38C (100.4F), Mild Pain (1 - 3)  melatonin 3 milliGRAM(s) Oral at bedtime PRN Insomnia                            10.5   10.08 )-----------( 521      ( 01 Dec 2024 05:31 )             31.0       12-01    137  |  101  |  13  ----------------------------<  240[H]  3.8   |  28  |  0.56    Ca    9.7      01 Dec 2024 05:31  Phos  2.5     12-01  Mg     1.5     12-01

## 2024-12-02 VITALS — HEART RATE: 99 BPM | OXYGEN SATURATION: 98 % | DIASTOLIC BLOOD PRESSURE: 70 MMHG | SYSTOLIC BLOOD PRESSURE: 145 MMHG

## 2024-12-02 LAB
ALBUMIN SERPL ELPH-MCNC: 1.8 G/DL — LOW (ref 3.5–5)
ALP SERPL-CCNC: 99 U/L — SIGNIFICANT CHANGE UP (ref 40–120)
ALT FLD-CCNC: 78 U/L DA — HIGH (ref 10–60)
ANION GAP SERPL CALC-SCNC: 9 MMOL/L — SIGNIFICANT CHANGE UP (ref 5–17)
AST SERPL-CCNC: 72 U/L — HIGH (ref 10–40)
BASOPHILS # BLD AUTO: 0.02 K/UL — SIGNIFICANT CHANGE UP (ref 0–0.2)
BASOPHILS NFR BLD AUTO: 0.2 % — SIGNIFICANT CHANGE UP (ref 0–2)
BILIRUB SERPL-MCNC: 0.3 MG/DL — SIGNIFICANT CHANGE UP (ref 0.2–1.2)
BUN SERPL-MCNC: 18 MG/DL — SIGNIFICANT CHANGE UP (ref 7–18)
CALCIUM SERPL-MCNC: 9.7 MG/DL — SIGNIFICANT CHANGE UP (ref 8.4–10.5)
CHLORIDE SERPL-SCNC: 103 MMOL/L — SIGNIFICANT CHANGE UP (ref 96–108)
CO2 SERPL-SCNC: 28 MMOL/L — SIGNIFICANT CHANGE UP (ref 22–31)
CREAT SERPL-MCNC: 0.52 MG/DL — SIGNIFICANT CHANGE UP (ref 0.5–1.3)
EGFR: 90 ML/MIN/1.73M2 — SIGNIFICANT CHANGE UP
EOSINOPHIL # BLD AUTO: 0.13 K/UL — SIGNIFICANT CHANGE UP (ref 0–0.5)
EOSINOPHIL NFR BLD AUTO: 1.4 % — SIGNIFICANT CHANGE UP (ref 0–6)
GLUCOSE BLDC GLUCOMTR-MCNC: 263 MG/DL — HIGH (ref 70–99)
GLUCOSE BLDC GLUCOMTR-MCNC: 359 MG/DL — HIGH (ref 70–99)
GLUCOSE SERPL-MCNC: 288 MG/DL — HIGH (ref 70–99)
HCT VFR BLD CALC: 30.5 % — LOW (ref 34.5–45)
HGB BLD-MCNC: 10 G/DL — LOW (ref 11.5–15.5)
IMM GRANULOCYTES NFR BLD AUTO: 0.4 % — SIGNIFICANT CHANGE UP (ref 0–0.9)
LYMPHOCYTES # BLD AUTO: 0.83 K/UL — LOW (ref 1–3.3)
LYMPHOCYTES # BLD AUTO: 9.1 % — LOW (ref 13–44)
MAGNESIUM SERPL-MCNC: 1.5 MG/DL — LOW (ref 1.6–2.6)
MCHC RBC-ENTMCNC: 29.4 PG — SIGNIFICANT CHANGE UP (ref 27–34)
MCHC RBC-ENTMCNC: 32.8 G/DL — SIGNIFICANT CHANGE UP (ref 32–36)
MCV RBC AUTO: 89.7 FL — SIGNIFICANT CHANGE UP (ref 80–100)
MONOCYTES # BLD AUTO: 0.75 K/UL — SIGNIFICANT CHANGE UP (ref 0–0.9)
MONOCYTES NFR BLD AUTO: 8.2 % — SIGNIFICANT CHANGE UP (ref 2–14)
NEUTROPHILS # BLD AUTO: 7.39 K/UL — SIGNIFICANT CHANGE UP (ref 1.8–7.4)
NEUTROPHILS NFR BLD AUTO: 80.7 % — HIGH (ref 43–77)
NRBC # BLD: 0 /100 WBCS — SIGNIFICANT CHANGE UP (ref 0–0)
PHOSPHATE SERPL-MCNC: 2.5 MG/DL — SIGNIFICANT CHANGE UP (ref 2.5–4.5)
PLATELET # BLD AUTO: 526 K/UL — HIGH (ref 150–400)
POTASSIUM SERPL-MCNC: 3.9 MMOL/L — SIGNIFICANT CHANGE UP (ref 3.5–5.3)
POTASSIUM SERPL-SCNC: 3.9 MMOL/L — SIGNIFICANT CHANGE UP (ref 3.5–5.3)
PROT SERPL-MCNC: 6.8 G/DL — SIGNIFICANT CHANGE UP (ref 6–8.3)
RBC # BLD: 3.4 M/UL — LOW (ref 3.8–5.2)
RBC # FLD: 13.8 % — SIGNIFICANT CHANGE UP (ref 10.3–14.5)
SODIUM SERPL-SCNC: 140 MMOL/L — SIGNIFICANT CHANGE UP (ref 135–145)
WBC # BLD: 9.16 K/UL — SIGNIFICANT CHANGE UP (ref 3.8–10.5)
WBC # FLD AUTO: 9.16 K/UL — SIGNIFICANT CHANGE UP (ref 3.8–10.5)

## 2024-12-02 PROCEDURE — 96374 THER/PROPH/DIAG INJ IV PUSH: CPT

## 2024-12-02 PROCEDURE — 88305 TISSUE EXAM BY PATHOLOGIST: CPT

## 2024-12-02 PROCEDURE — 80048 BASIC METABOLIC PNL TOTAL CA: CPT

## 2024-12-02 PROCEDURE — 71045 X-RAY EXAM CHEST 1 VIEW: CPT

## 2024-12-02 PROCEDURE — 85027 COMPLETE CBC AUTOMATED: CPT

## 2024-12-02 PROCEDURE — 36415 COLL VENOUS BLD VENIPUNCTURE: CPT

## 2024-12-02 PROCEDURE — 86901 BLOOD TYPING SEROLOGIC RH(D): CPT

## 2024-12-02 PROCEDURE — 99285 EMERGENCY DEPT VISIT HI MDM: CPT | Mod: 25

## 2024-12-02 PROCEDURE — 88312 SPECIAL STAINS GROUP 1: CPT

## 2024-12-02 PROCEDURE — 86900 BLOOD TYPING SEROLOGIC ABO: CPT

## 2024-12-02 PROCEDURE — 83605 ASSAY OF LACTIC ACID: CPT

## 2024-12-02 PROCEDURE — 96376 TX/PRO/DX INJ SAME DRUG ADON: CPT

## 2024-12-02 PROCEDURE — 87637 SARSCOV2&INF A&B&RSV AMP PRB: CPT

## 2024-12-02 PROCEDURE — 87040 BLOOD CULTURE FOR BACTERIA: CPT

## 2024-12-02 PROCEDURE — 0241U: CPT

## 2024-12-02 PROCEDURE — 81001 URINALYSIS AUTO W/SCOPE: CPT

## 2024-12-02 PROCEDURE — 80053 COMPREHEN METABOLIC PANEL: CPT

## 2024-12-02 PROCEDURE — 97110 THERAPEUTIC EXERCISES: CPT

## 2024-12-02 PROCEDURE — 96375 TX/PRO/DX INJ NEW DRUG ADDON: CPT

## 2024-12-02 PROCEDURE — 84100 ASSAY OF PHOSPHORUS: CPT

## 2024-12-02 PROCEDURE — 97116 GAIT TRAINING THERAPY: CPT

## 2024-12-02 PROCEDURE — 82962 GLUCOSE BLOOD TEST: CPT

## 2024-12-02 PROCEDURE — 85025 COMPLETE CBC W/AUTO DIFF WBC: CPT

## 2024-12-02 PROCEDURE — 93005 ELECTROCARDIOGRAM TRACING: CPT

## 2024-12-02 PROCEDURE — 83735 ASSAY OF MAGNESIUM: CPT

## 2024-12-02 PROCEDURE — 86850 RBC ANTIBODY SCREEN: CPT

## 2024-12-02 PROCEDURE — 85730 THROMBOPLASTIN TIME PARTIAL: CPT

## 2024-12-02 PROCEDURE — 97162 PT EVAL MOD COMPLEX 30 MIN: CPT

## 2024-12-02 PROCEDURE — 85610 PROTHROMBIN TIME: CPT

## 2024-12-02 RX ORDER — TIRZEPATIDE 2.5 MG/.5ML
5 INJECTION, SOLUTION SUBCUTANEOUS
Refills: 0 | DISCHARGE

## 2024-12-02 RX ORDER — METOPROLOL TARTRATE 100 MG/1
25 TABLET, FILM COATED ORAL ONCE
Refills: 0 | Status: COMPLETED | OUTPATIENT
Start: 2024-12-02 | End: 2024-12-02

## 2024-12-02 RX ORDER — GABAPENTIN 300 MG/1
1 CAPSULE ORAL
Refills: 0 | DISCHARGE

## 2024-12-02 RX ORDER — METOPROLOL TARTRATE 100 MG/1
1 TABLET, FILM COATED ORAL
Qty: 0 | Refills: 0 | DISCHARGE
Start: 2024-12-02

## 2024-12-02 RX ORDER — INSULIN DEGLUDEC 100 U/ML
15 INJECTION, SOLUTION SUBCUTANEOUS
Refills: 0 | DISCHARGE

## 2024-12-02 RX ORDER — METOPROLOL TARTRATE 100 MG/1
50 TABLET, FILM COATED ORAL EVERY 12 HOURS
Refills: 0 | Status: DISCONTINUED | OUTPATIENT
Start: 2024-12-02 | End: 2024-12-02

## 2024-12-02 RX ORDER — PANTOPRAZOLE SODIUM 40 MG/1
1 TABLET, DELAYED RELEASE ORAL
Qty: 0 | Refills: 0 | DISCHARGE
Start: 2024-12-02

## 2024-12-02 RX ORDER — ACETAMINOPHEN 500MG 500 MG/1
2 TABLET, COATED ORAL
Qty: 0 | Refills: 0 | DISCHARGE
Start: 2024-12-02

## 2024-12-02 RX ORDER — ENALAPRIL MALEATE 2.5 MG/1
1 TABLET ORAL
Refills: 0 | DISCHARGE

## 2024-12-02 RX ORDER — INSULIN GLARGINE 100 [IU]/ML
5 INJECTION, SOLUTION SUBCUTANEOUS
Qty: 0 | Refills: 0 | DISCHARGE
Start: 2024-12-02

## 2024-12-02 RX ORDER — GABAPENTIN 300 MG/1
1 CAPSULE ORAL
Qty: 0 | Refills: 0 | DISCHARGE
Start: 2024-12-02

## 2024-12-02 RX ORDER — METOPROLOL TARTRATE 100 MG/1
1 TABLET, FILM COATED ORAL
Refills: 0 | DISCHARGE

## 2024-12-02 RX ADMIN — GABAPENTIN 300 MILLIGRAM(S): 300 CAPSULE ORAL at 11:21

## 2024-12-02 RX ADMIN — Medication 100 GRAM(S): at 11:21

## 2024-12-02 RX ADMIN — METOPROLOL TARTRATE 25 MILLIGRAM(S): 100 TABLET, FILM COATED ORAL at 10:33

## 2024-12-02 RX ADMIN — ENOXAPARIN SODIUM 40 MILLIGRAM(S): 30 INJECTION SUBCUTANEOUS at 11:21

## 2024-12-02 RX ADMIN — ACETAMINOPHEN 500MG 650 MILLIGRAM(S): 500 TABLET, COATED ORAL at 07:45

## 2024-12-02 RX ADMIN — Medication 10: at 12:12

## 2024-12-02 RX ADMIN — Medication 6: at 07:49

## 2024-12-02 RX ADMIN — METOPROLOL TARTRATE 25 MILLIGRAM(S): 100 TABLET, FILM COATED ORAL at 06:06

## 2024-12-02 RX ADMIN — ACETAMINOPHEN 500MG 650 MILLIGRAM(S): 500 TABLET, COATED ORAL at 08:28

## 2024-12-02 RX ADMIN — PANTOPRAZOLE SODIUM 40 MILLIGRAM(S): 40 TABLET, DELAYED RELEASE ORAL at 06:06

## 2024-12-02 NOTE — PROGRESS NOTE ADULT - PROVIDER SPECIALTY LIST ADULT
Internal Medicine
Infectious Disease
Internal Medicine
Internal Medicine
Gastroenterology
Gastroenterology
Internal Medicine
Gastroenterology

## 2024-12-02 NOTE — PROGRESS NOTE ADULT - NEGATIVE NEUROLOGICAL SYMPTOMS
no syncope/no tremors/no vertigo/no loss of sensation/no headache

## 2024-12-02 NOTE — PROGRESS NOTE ADULT - EYES
PERRL/EOMI/conjunctiva clear/non icteric sclera

## 2024-12-02 NOTE — PROGRESS NOTE ADULT - SUBJECTIVE AND OBJECTIVE BOX
[   ] ICU                                          [   ] CCU                                      [ X  ] Medical Floor    Patient is a 84 year old female with GI bleeding and anemia. GI consulted to evaluate.      Patient is a 84 year old female, from rehab, usually lives at home, ambulates with a cane with past medical history significant for DM, HTN, Hyperlipidemia and dementia presented to the emergency room with few episodes of melena and fever. patient recently had EGD for melena where she was found to have duodenal ulcer. No abdominal pain, nausea, vomiting, hematemesis, hematochezia, hemoptysis, chest pain, SOB, cough, hematuria, dysuria or diarrhea reported.    Patient is comfortable. No new complaints reported, No abdominal pain, N/V, hematemesis, hematochezia, melena, fever, chills, chest pain, SOB, cough or diarrhea reported.      PAIN MANAGEMENT:  Pain Scale:                0 /10  Pain Location:         PAST MEDICAL HISTORY    DM (diabetes mellitus)    HTN (hypertension)    Hyperlipidemia    Dementia        PAST SURGICAL HISTORY    No significant past surgical history reported        Allergies    No Known Allergies    Intolerances  None         SOCIAL HISTORY  Advanced Directives:       [ X ] Full Code       [  ] DNR  Marital Status:         [  ] M      [ X ] S      [  ] D       [  ] W  Children:       [ X ] Yes      [  ] No  Occupation:        [  ] Employed       [ X ] Unemployed       [  ] Retired  Diet:       [ X ] Regular       [  ] PEG feeding          [  ] NG tube feeding  Drug Use:           [ X ] No           [  ] Yes  Alcohol:           [X  ] No             [  ] Yes (socially)         [  ] Yes (chronic)  Tobacco:           [  ] Yes           [ X ] No      FAMILY HISTORY  [ X ] Heart Disease            [ X ] Diabetes             [ X ] HTN             [  ] Colon Cancer             [  ] Stomach Cancer              [  ] Pancreatic Cancer          VITALS   Vital Signs Last 24 Hrs  T(C): 36.6 (12-02-24 @ 05:19), Max: 37.4 (12-01-24 @ 09:22)  T(F): 97.8 (12-02-24 @ 05:19), Max: 99.3 (12-01-24 @ 09:22)  HR: 107 (12-02-24 @ 05:19) (84 - 110)  BP: 147/66 (12-02-24 @ 05:19) (104/68 - 147/66)  BP(mean): 88 (12-02-24 @ 05:19) (88 - 88)  RR: 18 (12-02-24 @ 05:19) (16 - 18)  SpO2: 95% (12-02-24 @ 05:19) (95% - 98%)      MEDICATIONS  (STANDING):  atorvastatin 10 milliGRAM(s) Oral at bedtime  enoxaparin Injectable 40 milliGRAM(s) SubCutaneous every 24 hours  gabapentin 300 milliGRAM(s) Oral daily  glucagon  Injectable 1 milliGRAM(s) IntraMuscular once  insulin glargine Injectable (LANTUS) 5 Unit(s) SubCutaneous at bedtime  insulin lispro (ADMELOG) corrective regimen sliding scale   SubCutaneous three times a day before meals  insulin lispro (ADMELOG) corrective regimen sliding scale   SubCutaneous at bedtime  lactated ringers. 1000 milliLiter(s) (75 mL/Hr) IV Continuous <Continuous>  lactated ringers. 1000 milliLiter(s) (75 mL/Hr) IV Continuous <Continuous>  metoprolol tartrate 50 milliGRAM(s) Oral every 12 hours  metoprolol tartrate 25 milliGRAM(s) Oral once  pantoprazole    Tablet 40 milliGRAM(s) Oral every 12 hours    MEDICATIONS  (PRN):  acetaminophen     Tablet .. 650 milliGRAM(s) Oral every 6 hours PRN Temp greater or equal to 38C (100.4F), Mild Pain (1 - 3)  melatonin 3 milliGRAM(s) Oral at bedtime PRN Insomnia                            10.0   9.16  )-----------( 526      ( 02 Dec 2024 07:16 )             30.5       12-02    140  |  103  |  18  ----------------------------<  288[H]  3.9   |  28  |  0.52    Ca    9.7      02 Dec 2024 07:16  Phos  2.5     12-02  Mg     1.5     12-02    TPro  6.8  /  Alb  1.8[L]  /  TBili  0.3  /  DBili  x   /  AST  72[H]  /  ALT  78[H]  /  AlkPhos  99  12-02

## 2024-12-02 NOTE — PROGRESS NOTE ADULT - NEGATIVE GENERAL SYMPTOMS
no chills/no sweating/no polyphagia/no polyuria/no polydipsia

## 2024-12-02 NOTE — DIETITIAN INITIAL EVALUATION ADULT - PERTINENT MEDS FT
MEDICATIONS  (STANDING):  atorvastatin 10 milliGRAM(s) Oral at bedtime  enoxaparin Injectable 40 milliGRAM(s) SubCutaneous every 24 hours  gabapentin 300 milliGRAM(s) Oral daily  glucagon  Injectable 1 milliGRAM(s) IntraMuscular once  insulin glargine Injectable (LANTUS) 5 Unit(s) SubCutaneous at bedtime  insulin lispro (ADMELOG) corrective regimen sliding scale   SubCutaneous three times a day before meals  insulin lispro (ADMELOG) corrective regimen sliding scale   SubCutaneous at bedtime  lactated ringers. 1000 milliLiter(s) (75 mL/Hr) IV Continuous <Continuous>  lactated ringers. 1000 milliLiter(s) (75 mL/Hr) IV Continuous <Continuous>  magnesium sulfate  IVPB 1 Gram(s) IV Intermittent once  metoprolol tartrate 50 milliGRAM(s) Oral every 12 hours  metoprolol tartrate 25 milliGRAM(s) Oral once  pantoprazole    Tablet 40 milliGRAM(s) Oral every 12 hours    MEDICATIONS  (PRN):  acetaminophen     Tablet .. 650 milliGRAM(s) Oral every 6 hours PRN Temp greater or equal to 38C (100.4F), Mild Pain (1 - 3)  melatonin 3 milliGRAM(s) Oral at bedtime PRN Insomnia

## 2024-12-02 NOTE — PROGRESS NOTE ADULT - NEGATIVE SKIN SYMPTOMS
no rash/no itching/no dryness/no brittle nails/no pitted nails/no hair loss

## 2024-12-02 NOTE — DISCHARGE NOTE NURSING/CASE MANAGEMENT/SOCIAL WORK - FINANCIAL ASSISTANCE
[facility] provides services at a reduced cost to those who are determined to be eligible through [facility]’s financial assistance program. Information regarding [facility]’s financial assistance program can be found by going to [email or web address] or by calling [phone].

## 2024-12-02 NOTE — PROGRESS NOTE ADULT - MOUTH
normal mouth and gums/moist

## 2024-12-02 NOTE — DIETITIAN INITIAL EVALUATION ADULT - ADD RECOMMEND
Continue current diet, check weights, labs and intake, allow family to bring food, rec Glucerna Shake BID.

## 2024-12-02 NOTE — DIETITIAN INITIAL EVALUATION ADULT - ETIOLOGY
Altered nutrition related lab values evidenced by elevated Glu, POCT Glu, low Alb and Mg related to Dx of DM, sepsis, GI bleeding Inadequate oral intake evidenced by 50% intake related to sepsis, GI bleed and other medical dx.

## 2024-12-02 NOTE — PROGRESS NOTE ADULT - NEGATIVE RESPIRATORY AND THORAX SYMPTOMS
no wheezing/no dyspnea/no cough/no hemoptysis/no pleuritic chest pain

## 2024-12-02 NOTE — PROGRESS NOTE ADULT - NEGATIVE ENMT SYMPTOMS
no hearing difficulty/no ear pain/no tinnitus/no vertigo/no sinus symptoms/no nasal congestion/no nasal discharge/no nose bleeds/no gum bleeding/no dry mouth/no throat pain/no dysphagia

## 2024-12-02 NOTE — PROGRESS NOTE ADULT - CONSTITUTIONAL
well-groomed/no distress

## 2024-12-02 NOTE — PROGRESS NOTE ADULT - NEGATIVE OPHTHALMOLOGIC SYMPTOMS
no diplopia/no photophobia/no lacrimation L/no lacrimation R/no blurred vision L/no blurred vision R/no discharge L/no discharge R/no pain L/no pain R/no irritation L/no irritation R/no scleral injection L/no scleral injection R

## 2024-12-02 NOTE — DIETITIAN INITIAL EVALUATION ADULT - PROBLEM SELECTOR PLAN 1
P/w two episodes of melena at rehab  S/p recent discharge from [facility] for GI bleed and melena  Hb stable at 11.6  - Holding all antihypertensives   - Maintain two large bore IVs   - Maintain active T&S  - NPO for now  - Protonix 40 bid  - Primary team to consult GI in the AM

## 2024-12-02 NOTE — PROGRESS NOTE ADULT - NEGATIVE CARDIOVASCULAR SYMPTOMS
no chest pain/no palpitations/no orthopnea

## 2024-12-02 NOTE — PROGRESS NOTE ADULT - REASON FOR ADMISSION
Peggy

## 2024-12-02 NOTE — DIETITIAN INITIAL EVALUATION ADULT - NSFNSNUTRCULTURALRELIGIOUSFT_GEN_A_CORE
Intake affected by age, cultural preferences for Russian food that is kosher-home cooked meals, GI bleed causing fatigue/sleeping most of the time.

## 2024-12-02 NOTE — PROGRESS NOTE ADULT - RESPIRATORY
clear to auscultation bilaterally/no wheezes/no rales/no rhonchi/no respiratory distress/no use of accessory muscles/no subcutaneous emphysema/airway patent/breath sounds equal/good air movement

## 2024-12-02 NOTE — DIETITIAN INITIAL EVALUATION ADULT - NS FNS DIET ORDER
Diet, Consistent Carbohydrate/No Snacks:   DASH/TLC {Sodium & Cholesterol Restricted}  Kosher  Supplement Feeding Modality:  Oral  Glucerna Shake Cans or Servings Per Day:  1       Frequency:  Two Times a day (12-02-24 @ 09:45) [Pending Verification By Attending]  Diet, Regular:   Consistent Carbohydrate {No Snacks}  DASH/TLC {Sodium & Cholesterol Restricted}  Kosher (11-26-24 @ 12:00) [Active]

## 2024-12-02 NOTE — PROGRESS NOTE ADULT - NEGATIVE MUSCULOSKELETAL SYMPTOMS
no muscle cramps/no stiffness/no neck pain/no arm pain L/no arm pain R/no leg pain L/no leg pain R

## 2024-12-02 NOTE — DISCHARGE NOTE NURSING/CASE MANAGEMENT/SOCIAL WORK - PATIENT PORTAL LINK FT
You can access the FollowMyHealth Patient Portal offered by [facility] by registering at the following website: [email or web address]. By joining [employer]’s FollowMyHealth portal, you will also be able to view your health information using other applications (apps) compatible with our system.

## 2024-12-02 NOTE — DIETITIAN INITIAL EVALUATION ADULT - PERTINENT LABORATORY DATA
12-02    140  |  103  |  18  ----------------------------<  288[H]  3.9   |  28  |  0.52    Ca    9.7      02 Dec 2024 07:16  Phos  2.5     12-02  Mg     1.5     12-02    TPro  6.8  /  Alb  1.8[L]  /  TBili  0.3  /  DBili  x   /  AST  72[H]  /  ALT  78[H]  /  AlkPhos  99  12-02  POCT Blood Glucose.: 263 mg/dL (12-02-24 @ 07:25)  A1C with Estimated Average Glucose Result: 7.4 % (11-18-24 @ 08:14)

## 2024-12-02 NOTE — PROGRESS NOTE ADULT - NEGATIVE GENERAL GENITOURINARY SYMPTOMS
no hematuria/no renal colic/no flank pain L/no flank pain R

## 2024-12-02 NOTE — DIETITIAN INITIAL EVALUATION ADULT - ORAL INTAKE PTA/DIET HISTORY
Patient is Gu[place]ian speaking and her daughter Sury provided translation and information for the assessment. Denied weight loss, appetite is poor to fair. Patient was eating well until before her previous admission 2 weeks ago then all of a sudden, stopped eating and doing things as she used to. No preferences discussed except Kosher Food only. Daughter is bringing favorite foods from home which is accepted better than [facility] foods.

## 2024-12-02 NOTE — DIETITIAN INITIAL EVALUATION ADULT - LITERATURE/VIDEOS GIVEN
Daughter-Sury understands all aspects of diabetic diet and prepares food, is with her mother assisting with care, did not want further diet education.

## 2024-12-02 NOTE — PROGRESS NOTE ADULT - NEGATIVE HEMATOLOGY SYMPTOMS
no gum bleeding/no nose bleeding/no skin lumps

## 2024-12-02 NOTE — DIETITIAN INITIAL EVALUATION ADULT - NUTRITON FOCUSED PHYSICAL EXAM
no... Purse String (Simple) Text: Given the location of the defect and the characteristics of the surrounding skin a purse string closure was deemed most appropriate.  Undermining was performed circumfirentially around the surgical defect.  A purse string suture was then placed and tightened.

## 2024-12-02 NOTE — PROGRESS NOTE ADULT - SKIN
warm and dry/no rashes

## 2024-12-02 NOTE — PROGRESS NOTE ADULT - TONSILS
no redness/no swelling
no redness/no swelling/no discharge
no redness/no swelling

## 2024-12-02 NOTE — PROGRESS NOTE ADULT - NEGATIVE GASTROINTESTINAL SYMPTOMS
no nausea/no vomiting/no diarrhea/no abdominal pain/no hematochezia/no steatorrhea/no jaundice/no hiccoughs

## 2024-12-02 NOTE — PROGRESS NOTE ADULT - NECK
supple/no tracheal deviation
supple/symmetric/no tracheal deviation

## 2024-12-03 LAB — SURGICAL PATHOLOGY STUDY: SIGNIFICANT CHANGE UP

## 2025-06-09 ENCOUNTER — EMERGENCY (EMERGENCY)
Facility: HOSPITAL | Age: 85
LOS: 1 days | End: 2025-06-09
Payer: MEDICARE

## 2025-06-09 PROCEDURE — 83605 ASSAY OF LACTIC ACID: CPT

## 2025-06-09 PROCEDURE — 96374 THER/PROPH/DIAG INJ IV PUSH: CPT | Mod: XU

## 2025-06-09 PROCEDURE — 80053 COMPREHEN METABOLIC PANEL: CPT

## 2025-06-09 PROCEDURE — 86901 BLOOD TYPING SEROLOGIC RH(D): CPT

## 2025-06-09 PROCEDURE — 83690 ASSAY OF LIPASE: CPT

## 2025-06-09 PROCEDURE — 86900 BLOOD TYPING SEROLOGIC ABO: CPT

## 2025-06-09 PROCEDURE — 85610 PROTHROMBIN TIME: CPT

## 2025-06-09 PROCEDURE — 86850 RBC ANTIBODY SCREEN: CPT

## 2025-06-09 PROCEDURE — 74177 CT ABD & PELVIS W/CONTRAST: CPT

## 2025-06-09 PROCEDURE — 85730 THROMBOPLASTIN TIME PARTIAL: CPT

## 2025-06-09 PROCEDURE — 36415 COLL VENOUS BLD VENIPUNCTURE: CPT

## 2025-06-09 PROCEDURE — 99284 EMERGENCY DEPT VISIT MOD MDM: CPT | Mod: 25

## 2025-06-09 PROCEDURE — 85025 COMPLETE CBC W/AUTO DIFF WBC: CPT

## (undated) DEVICE — SOL IRR POUR NS 0.9% 500ML

## (undated) DEVICE — DENTURE CUP PINK

## (undated) DEVICE — DRSG 2X2

## (undated) DEVICE — UNDERPAD LINEN SAVER 17 X 24"

## (undated) DEVICE — WARMING BLANKET FULL ADULT

## (undated) DEVICE — SALIVA EJECTOR (BLUE)

## (undated) DEVICE — GOWN LG

## (undated) DEVICE — LABELS BLANK W PEN

## (undated) DEVICE — SOLIDIFIER 1200CC

## (undated) DEVICE — NDL HYPO SAFE 22G X 1" (BLACK)

## (undated) DEVICE — SOL INJ NS 0.9% 500ML 1-PORT

## (undated) DEVICE — DRSG CURITY GAUZE SPONGE 4 X 4" 12-PLY NON-STERILE

## (undated) DEVICE — SYR LUER LOK 3CC

## (undated) DEVICE — PACK IV START WITH CHG

## (undated) DEVICE — CONTAINER FORMALIN 10% 20ML

## (undated) DEVICE — VENODYNE/SCD SLEEVE CALF MEDIUM

## (undated) DEVICE — TUBING MEDI-VAC W MAXIGRIP CONNECTORS 1/4"X6'

## (undated) DEVICE — TUBING IV SET GRAVITY 1Y 78" MACRO

## (undated) DEVICE — BITE BLOCK ADULT 20 X 27MM (GREEN)

## (undated) DEVICE — CATH IV SAFE BC 22G X 1" (BLUE)

## (undated) DEVICE — Device

## (undated) DEVICE — ANESTHESIA CIRCUIT ADULT HMEF

## (undated) DEVICE — TUBING ENDO EXT OLYMPUS 160 24HR USE GI